# Patient Record
Sex: FEMALE | Race: WHITE | NOT HISPANIC OR LATINO | Employment: OTHER | URBAN - METROPOLITAN AREA
[De-identification: names, ages, dates, MRNs, and addresses within clinical notes are randomized per-mention and may not be internally consistent; named-entity substitution may affect disease eponyms.]

---

## 2021-09-01 ENCOUNTER — TELEPHONE (OUTPATIENT)
Dept: FAMILY MEDICINE CLINIC | Facility: CLINIC | Age: 71
End: 2021-09-01

## 2021-09-24 ENCOUNTER — OFFICE VISIT (OUTPATIENT)
Dept: FAMILY MEDICINE CLINIC | Facility: CLINIC | Age: 71
End: 2021-09-24

## 2021-09-24 VITALS
BODY MASS INDEX: 33.16 KG/M2 | HEART RATE: 76 BPM | SYSTOLIC BLOOD PRESSURE: 120 MMHG | RESPIRATION RATE: 16 BRPM | WEIGHT: 180.2 LBS | OXYGEN SATURATION: 95 % | HEIGHT: 62 IN | TEMPERATURE: 96.2 F | DIASTOLIC BLOOD PRESSURE: 68 MMHG

## 2021-09-24 DIAGNOSIS — E11.43 TYPE 2 DIABETES MELLITUS WITH DIABETIC AUTONOMIC NEUROPATHY, WITHOUT LONG-TERM CURRENT USE OF INSULIN (HCC): Primary | ICD-10-CM

## 2021-09-24 DIAGNOSIS — I10 ESSENTIAL HYPERTENSION: ICD-10-CM

## 2021-09-24 DIAGNOSIS — E78.5 HYPERLIPIDEMIA, UNSPECIFIED HYPERLIPIDEMIA TYPE: ICD-10-CM

## 2021-09-24 DIAGNOSIS — G47.33 OBSTRUCTIVE SLEEP APNEA: ICD-10-CM

## 2021-09-24 DIAGNOSIS — K21.9 GASTROESOPHAGEAL REFLUX DISEASE WITHOUT ESOPHAGITIS: ICD-10-CM

## 2021-09-24 PROCEDURE — 99204 OFFICE O/P NEW MOD 45 MIN: CPT | Performed by: INTERNAL MEDICINE

## 2021-09-24 RX ORDER — AMLODIPINE BESYLATE 5 MG/1
5 TABLET ORAL DAILY
COMMUNITY
End: 2022-03-21 | Stop reason: SDUPTHER

## 2021-09-24 RX ORDER — ESCITALOPRAM OXALATE 10 MG/1
10 TABLET ORAL DAILY
COMMUNITY
End: 2021-10-18 | Stop reason: SDUPTHER

## 2021-09-24 RX ORDER — DIPHENOXYLATE HYDROCHLORIDE AND ATROPINE SULFATE 2.5; .025 MG/1; MG/1
1 TABLET ORAL 4 TIMES DAILY PRN
COMMUNITY
End: 2022-03-09 | Stop reason: SDUPTHER

## 2021-09-24 RX ORDER — ONDANSETRON 4 MG/1
4 TABLET, FILM COATED ORAL EVERY 8 HOURS PRN
COMMUNITY
End: 2021-12-03

## 2021-09-24 RX ORDER — DIPHENOXYLATE HYDROCHLORIDE AND ATROPINE SULFATE 2.5; .025 MG/1; MG/1
TABLET ORAL DAILY
COMMUNITY
End: 2022-11-03

## 2021-09-24 RX ORDER — MELOXICAM 15 MG/1
15 TABLET ORAL DAILY
COMMUNITY
End: 2021-12-03

## 2021-09-24 RX ORDER — NYSTATIN 100000 U/G
1 OINTMENT TOPICAL 2 TIMES DAILY
COMMUNITY
End: 2022-06-28 | Stop reason: SDUPTHER

## 2021-09-24 RX ORDER — ATORVASTATIN CALCIUM 20 MG/1
20 TABLET, FILM COATED ORAL DAILY
COMMUNITY
End: 2021-12-14 | Stop reason: SDUPTHER

## 2021-09-24 RX ORDER — ESOMEPRAZOLE MAGNESIUM 40 MG/1
40 CAPSULE, DELAYED RELEASE ORAL
COMMUNITY
End: 2022-07-21 | Stop reason: SDUPTHER

## 2021-09-24 RX ORDER — METFORMIN HYDROCHLORIDE 500 MG/1
500 TABLET, EXTENDED RELEASE ORAL
COMMUNITY
End: 2021-10-18 | Stop reason: SDUPTHER

## 2021-09-24 RX ORDER — GABAPENTIN 300 MG/1
300 CAPSULE ORAL 3 TIMES DAILY
COMMUNITY
End: 2021-11-18 | Stop reason: SDUPTHER

## 2021-09-24 NOTE — PROGRESS NOTES
"Chief Complaint  Establish Care    Subjective          Pearl Christiansen presents to [facility]  History of Present Illness  Here as a new patient and recently moved from [place] to be near her son, Diego Christiansen and his family.  Has recently had what sounds like lichen sclerosis and a gyn in [place] biopsied it and eventually an area   She has NIDDM on metformin 500 mg bid and has neuropathy on gabapentin 300 mg tid and mobic 15 mg qd.  FBS test bid and usually run 126-130.  a1c around 7.  Not exercising.  Denies kidney issues.  Has seen an eye doc within the year.  Has ALEXANDER and uses CPAP nightly.      Objective   Vital Signs:   /68   Pulse 76   Temp 96.2 °F (35.7 °C) (Temporal)   Resp 16   Ht 157.5 cm (62\")   Wt 81.7 kg (180 lb 3.2 oz)   SpO2 95%   BMI 32.96 kg/m²     Physical Exam  Vitals and nursing note reviewed.   Constitutional:       Appearance: Normal appearance. She is well-developed.   HENT:      Head: Normocephalic and atraumatic.      Right Ear: External ear normal.      Left Ear: External ear normal.   Eyes:      Extraocular Movements: Extraocular movements intact.      Conjunctiva/sclera: Conjunctivae normal.   Neck:      Vascular: No carotid bruit.   Cardiovascular:      Rate and Rhythm: Normal rate and regular rhythm.      Heart sounds: Normal heart sounds.      Comments: No bruits  Pulmonary:      Effort: Pulmonary effort is normal. No respiratory distress.      Breath sounds: Normal breath sounds. No wheezing or rales.   Abdominal:      General: Bowel sounds are normal. There is no distension.      Palpations: Abdomen is soft. There is no mass.      Tenderness: There is no abdominal tenderness.   Musculoskeletal:      Cervical back: Neck supple.   Lymphadenopathy:      Cervical: No cervical adenopathy.   Skin:     General: Skin is warm.   Neurological:      General: No focal deficit present.      Mental Status: She is alert and oriented to person, place, and " time.   Psychiatric:         Mood and Affect: Mood normal.         Behavior: Behavior normal.         Thought Content: Thought content normal.         Judgment: Judgment normal.        Result Review :                 Assessment and Plan    Diagnoses and all orders for this visit:    1. Type 2 diabetes mellitus with diabetic autonomic neuropathy, without long-term current use of insulin (CMS/Formerly [facility]) (Primary)  -     Ambulatory Referral to Ophthalmology  -     Comprehensive Metabolic Panel; Future  -     Hemoglobin A1c; Future  -     Lipid Panel With LDL / HDL Ratio; Future  -     TSH; Future  -     T4, Free; Future    2. Essential hypertension  -     Comprehensive Metabolic Panel; Future  -     Hemoglobin A1c; Future  -     Lipid Panel With LDL / HDL Ratio; Future  -     TSH; Future  -     T4, Free; Future    3. Hyperlipidemia, unspecified hyperlipidemia type  -     Comprehensive Metabolic Panel; Future  -     Hemoglobin A1c; Future  -     Lipid Panel With LDL / HDL Ratio; Future  -     TSH; Future  -     T4, Free; Future    4. Gastroesophageal reflux disease without esophagitis  -     Comprehensive Metabolic Panel; Future  -     Hemoglobin A1c; Future  -     Lipid Panel With LDL / HDL Ratio; Future  -     TSH; Future  -     T4, Free; Future        Follow Up   No follow-ups on file.  Patient was given instructions and counseling regarding her condition or for health maintenance advice. Please see specific information pulled into the AVS if appropriate.     Total time spent coordinating care for this patient 45 minutes.  Reviewing old information from previous healthcare provider in [place].  Reviewing care everywhere.  Updating vaccinations.  Formulating plan of care.  Reviewing lab data.  Reviewing medications.  Past medical history reviewed.  We have discussed that she will need high-dose flu shot and then if she wants the third booster of Covid she will be due for that in December.  Next year she can consider  shingles vaccination.  I have ordered fasting labs for her.  We discussed preventative counseling such as exercise importance and dietary restrictions.  She states that she has gained weight since she is moved to [place] due to lack of exercise and focus on diet.  She uses a CPAP machine I have referred her to sleep medicine.  I have referred her to an eye doctor in [place] because she did have a bleed earlier this year.  Medications are not due for refill at this time.  I have encouraged her to reach out to her gynecologist in [place] about the use of a steroid ointment indefinitely.  I advised her that oftentimes that can thin the skin.  I suggested that she reach out to them about a maintenance cream.

## 2021-09-27 DIAGNOSIS — I10 ESSENTIAL HYPERTENSION: ICD-10-CM

## 2021-09-27 DIAGNOSIS — K21.9 GASTROESOPHAGEAL REFLUX DISEASE WITHOUT ESOPHAGITIS: ICD-10-CM

## 2021-09-27 DIAGNOSIS — E11.43 TYPE 2 DIABETES MELLITUS WITH DIABETIC AUTONOMIC NEUROPATHY, WITHOUT LONG-TERM CURRENT USE OF INSULIN (HCC): ICD-10-CM

## 2021-09-27 DIAGNOSIS — E78.5 HYPERLIPIDEMIA, UNSPECIFIED HYPERLIPIDEMIA TYPE: ICD-10-CM

## 2021-09-29 LAB
ALBUMIN SERPL-MCNC: 4.6 G/DL (ref 3.5–5.2)
ALBUMIN/GLOB SERPL: 1.9 G/DL
ALP SERPL-CCNC: 66 U/L (ref 39–117)
ALT SERPL-CCNC: 13 U/L (ref 1–33)
AST SERPL-CCNC: 18 U/L (ref 1–32)
BILIRUB SERPL-MCNC: 0.3 MG/DL (ref 0–1.2)
BUN SERPL-MCNC: 20 MG/DL (ref 8–23)
BUN/CREAT SERPL: 22.5 (ref 7–25)
CALCIUM SERPL-MCNC: 9.4 MG/DL (ref 8.6–10.5)
CHLORIDE SERPL-SCNC: 101 MMOL/L (ref 98–107)
CHOLEST SERPL-MCNC: 162 MG/DL (ref 0–200)
CO2 SERPL-SCNC: 28.9 MMOL/L (ref 22–29)
CREAT SERPL-MCNC: 0.89 MG/DL (ref 0.57–1)
GLOBULIN SER CALC-MCNC: 2.4 GM/DL
GLUCOSE SERPL-MCNC: 122 MG/DL (ref 65–99)
HBA1C MFR BLD: 7.1 % (ref 4.8–5.6)
HDLC SERPL-MCNC: 42 MG/DL (ref 40–60)
LDLC SERPL CALC-MCNC: 78 MG/DL (ref 0–100)
LDLC/HDLC SERPL: 1.65 {RATIO}
POTASSIUM SERPL-SCNC: 4.4 MMOL/L (ref 3.5–5.2)
PROT SERPL-MCNC: 7 G/DL (ref 6–8.5)
SODIUM SERPL-SCNC: 138 MMOL/L (ref 136–145)
T4 FREE SERPL-MCNC: 0.75 NG/DL (ref 0.93–1.7)
TRIGL SERPL-MCNC: 253 MG/DL (ref 0–150)
TSH SERPL DL<=0.005 MIU/L-ACNC: 3.07 UIU/ML (ref 0.27–4.2)
VLDLC SERPL CALC-MCNC: 42 MG/DL (ref 5–40)

## 2021-09-30 DIAGNOSIS — R94.6 ABNORMAL RESULTS OF THYROID FUNCTION STUDIES: Primary | ICD-10-CM

## 2021-10-01 LAB
Lab: NORMAL
THYROGLOB AB SERPL-ACNC: <1 IU/ML (ref 0–0.9)
THYROPEROXIDASE AB SERPL-ACNC: 11 IU/ML (ref 0–34)
WRITTEN AUTHORIZATION: NORMAL

## 2021-10-04 ENCOUNTER — TELEPHONE (OUTPATIENT)
Dept: FAMILY MEDICINE CLINIC | Facility: CLINIC | Age: 71
End: 2021-10-04

## 2021-10-04 NOTE — TELEPHONE ENCOUNTER
"  Caller: ChristiansenPearl    Relationship: Self    Best call back number: [phone]    Caller requesting test results: SELF    What test was performed: LABS    When was the test performed: 09/28/2021 - THE PATIENT SHOWED UP TO THE APPOINTMENT THAT IS LABELED AS A \"NO SHOW\" ON HER PROFILE.     Where was the test performed: IN-OFFICE    Additional notes: THE PATIENT IS CONCERNED ABOUT HER THYROID LEVELS. SHE STATES THAT SHE DOES NOT FEEL LIKE HERSELF LATELY. SHE DOES NOT REMEMBER HER LAST PCP EVER CHECKING HER THYROID LEVEL.      "

## 2021-10-08 RX ORDER — LEVOTHYROXINE SODIUM 0.03 MG/1
25 TABLET ORAL DAILY
Qty: 90 TABLET | Refills: 0 | Status: SHIPPED | OUTPATIENT
Start: 2021-10-08 | End: 2022-01-05 | Stop reason: SDUPTHER

## 2021-10-13 ENCOUNTER — OFFICE VISIT (OUTPATIENT)
Dept: FAMILY MEDICINE CLINIC | Facility: CLINIC | Age: 71
End: 2021-10-13

## 2021-10-13 VITALS
WEIGHT: 180.2 LBS | BODY MASS INDEX: 33.16 KG/M2 | OXYGEN SATURATION: 95 % | DIASTOLIC BLOOD PRESSURE: 77 MMHG | SYSTOLIC BLOOD PRESSURE: 126 MMHG | HEART RATE: 96 BPM | HEIGHT: 62 IN | TEMPERATURE: 96.6 F

## 2021-10-13 DIAGNOSIS — K21.9 GASTROESOPHAGEAL REFLUX DISEASE, UNSPECIFIED WHETHER ESOPHAGITIS PRESENT: ICD-10-CM

## 2021-10-13 DIAGNOSIS — R00.2 PALPITATIONS: ICD-10-CM

## 2021-10-13 DIAGNOSIS — Z09 HOSPITAL DISCHARGE FOLLOW-UP: Primary | ICD-10-CM

## 2021-10-13 DIAGNOSIS — D64.9 ANEMIA, UNSPECIFIED TYPE: ICD-10-CM

## 2021-10-13 PROCEDURE — 99214 OFFICE O/P EST MOD 30 MIN: CPT | Performed by: NURSE PRACTITIONER

## 2021-10-13 NOTE — PROGRESS NOTES
"Chief Complaint  Hospital Follow Up Visit (hosp f/u for throat tightness and palpatations)    Luis Enrique Christiansen presents to [facility]  History of Present Illness New pt to me.  Here for ER f/u. Has frequent episodes of fluttering in chest that last few seconds and have been ongoing. Feels like a butterfly in chest and is brief.      On 10/5/21 she was sitting on couch and felt same fluttering but also had a tightening in her throat that was not usual. She also felt very fatigued afterward. Went to ER and had negative CTA and stress test. Labs were grossly normal.      Was previously under care of cardiology in [facility] and told she has a small blockage but does not need intervention.      Has recently moved from [place].  Has T2DM controlled with current meds.  Found to have a slightly low T4, normal TSH and started on low dose levothyroxine by PCP-did not start that prior to Er visit.      She does have quite a bit of reflux despite nexium 80 mg daily.  Was followed by GI.  She admits feeling like she has a pit in her stomach that is improved with food for short time.  No blood in stool.  Takes mobic at  for arthritis.     Denies current chest pain.  Intermittent flutters in chest last few seconds.  No n/v/melena.  Has IBS type sx.  Has neuropathy.  Has fatigue-worse in last couple months. No dizziness, HA.  Does not monitor HR or BP at home.     Objective   Vital Signs:   /77   Pulse 96   Temp 96.6 °F (35.9 °C)   Ht 157.5 cm (62\")   Wt 81.7 kg (180 lb 3.2 oz)   SpO2 95%   BMI 32.96 kg/m²     Physical Exam  Vitals and nursing note reviewed.   Constitutional:       General: She is not in acute distress.     Appearance: She is well-developed. She is obese. She is not ill-appearing or diaphoretic.   HENT:      Head: Normocephalic and atraumatic.      Right Ear: Ear canal and external ear normal.      Left Ear: Ear canal and external ear normal.      Ears: "      Comments: B/l TMs dull-no erythema     Mouth/Throat:      Mouth: Mucous membranes are moist.      Pharynx: No posterior oropharyngeal erythema.   Eyes:      General:         Right eye: No discharge.         Left eye: No discharge.      Conjunctiva/sclera: Conjunctivae normal.   Cardiovascular:      Rate and Rhythm: Normal rate and regular rhythm.      Heart sounds: Normal heart sounds.   Pulmonary:      Effort: Pulmonary effort is normal.      Breath sounds: Normal breath sounds.   Abdominal:      General: Bowel sounds are normal.      Palpations: Abdomen is soft.      Tenderness: There is no abdominal tenderness.   Musculoskeletal:         General: No deformity.      Comments: Gait smooth and steady   Skin:     General: Skin is warm and dry.   Neurological:      Mental Status: She is alert and oriented to person, place, and time.   Psychiatric:         Mood and Affect: Mood normal.         Behavior: Behavior normal.        Result Review :                 Assessment and Plan    Diagnoses and all orders for this visit:    1. Hospital discharge follow-up (Primary)    2. Palpitations  -     Ambulatory Referral to Cardiology    3. Gastroesophageal reflux disease, unspecified whether esophagitis present      Reviewed ER records-negative CTA, normal stress test. Labs grossly normal, A1C good.  TSH normal.  EKG was SR.      Given her ongoing sx I think she will benefit from event monitor possibly but will refer to cardiology for eval since she needs to be est anyway.  If any worsening sx go to ER.      She has quite a bit of reflux that could cause sx despite 80mg Nexium per day.  No GI eval in chart.  She is also mildly anemic in most recent labs and sx suggestive of PUD-I think she needs GI consult.  Take mobic only with food.  Stools for occult blood.  Will refer to GI. I think she needs an EGD if not had one. If stools negative will need w/u for anemia. Reflux may have been cause of her sx.      Flonase for  allergy sx.     All discussed and reviewed with pt and is agreeable with plan.  We discussed s/s requiring emergent tx.             Follow Up   Return if symptoms worsen or fail to improve.  Patient was given instructions and counseling regarding her condition or for health maintenance advice. Please see specific information pulled into the AVS if appropriate.

## 2021-10-18 DIAGNOSIS — R19.7 DIARRHEA, UNSPECIFIED TYPE: Primary | ICD-10-CM

## 2021-10-18 NOTE — TELEPHONE ENCOUNTER
Rx Refill Note  Requested Prescriptions     Pending Prescriptions Disp Refills   • metFORMIN ER (GLUCOPHAGE-XR) 500 MG 24 hr tablet 60 tablet 0     Sig: Take 1 tablet by mouth 2 (Two) Times a Day Before Meals.   • SITagliptin (JANUVIA) 50 MG tablet 30 tablet 0     Sig: Take 1 tablet by mouth Daily.   • diphenoxylate-atropine (LOMOTIL) 2.5-0.025 MG per tablet 120 tablet 0     Sig: Take 1 tablet by mouth 4 (Four) Times a Day As Needed for Diarrhea.   • escitalopram (LEXAPRO) 10 MG tablet 30 tablet 0     Sig: Take 1 tablet by mouth Daily.      Last office visit with prescribing clinician: 9/24/2021      Next office visit with prescribing clinician: 3/30/2022            Diego Leon MA  10/18/21, 11:26 EDT

## 2021-10-19 ENCOUNTER — CLINICAL SUPPORT (OUTPATIENT)
Dept: FAMILY MEDICINE CLINIC | Facility: CLINIC | Age: 71
End: 2021-10-19

## 2021-10-19 DIAGNOSIS — R19.7 DIARRHEA, UNSPECIFIED TYPE: Primary | ICD-10-CM

## 2021-10-19 PROCEDURE — 82274 ASSAY TEST FOR BLOOD FECAL: CPT | Performed by: INTERNAL MEDICINE

## 2021-10-19 PROCEDURE — 82274 ASSAY TEST FOR BLOOD FECAL: CPT | Performed by: NURSE PRACTITIONER

## 2021-10-19 RX ORDER — DIPHENOXYLATE HYDROCHLORIDE AND ATROPINE SULFATE 2.5; .025 MG/1; MG/1
1 TABLET ORAL 4 TIMES DAILY PRN
Qty: 120 TABLET | Refills: 0 | OUTPATIENT
Start: 2021-10-19

## 2021-10-19 RX ORDER — ESCITALOPRAM OXALATE 10 MG/1
10 TABLET ORAL DAILY
Qty: 90 TABLET | Refills: 1 | Status: SHIPPED | OUTPATIENT
Start: 2021-10-19 | End: 2022-04-25

## 2021-10-19 RX ORDER — METFORMIN HYDROCHLORIDE 500 MG/1
500 TABLET, EXTENDED RELEASE ORAL
Qty: 180 TABLET | Refills: 1 | Status: SHIPPED | OUTPATIENT
Start: 2021-10-19 | End: 2022-04-11

## 2021-10-20 LAB
EXPIRATION DATE 2: ABNORMAL
EXPIRATION DATE 3: ABNORMAL
EXPIRATION DATE: ABNORMAL
GASTROCULT GAST QL: NEGATIVE
HEMOCCULT SP2 STL QL: POSITIVE
HEMOCCULT SP3 STL QL: POSITIVE
Lab: ABNORMAL

## 2021-10-23 DIAGNOSIS — R19.5 HEMATEST POSITIVE STOOLS: Primary | ICD-10-CM

## 2021-10-28 ENCOUNTER — APPOINTMENT (OUTPATIENT)
Dept: SLEEP MEDICINE | Facility: HOSPITAL | Age: 71
End: 2021-10-28

## 2021-11-01 ENCOUNTER — PREP FOR SURGERY (OUTPATIENT)
Dept: SURGERY | Facility: SURGERY CENTER | Age: 71
End: 2021-11-01

## 2021-11-01 DIAGNOSIS — Z12.11 ENCOUNTER FOR SCREENING FOR MALIGNANT NEOPLASM OF COLON: ICD-10-CM

## 2021-11-01 DIAGNOSIS — D64.9 ANEMIA, UNSPECIFIED TYPE: Primary | ICD-10-CM

## 2021-11-01 RX ORDER — SODIUM CHLORIDE 0.9 % (FLUSH) 0.9 %
3 SYRINGE (ML) INJECTION EVERY 12 HOURS SCHEDULED
Status: CANCELLED | OUTPATIENT
Start: 2021-11-01

## 2021-11-01 RX ORDER — SODIUM CHLORIDE, SODIUM LACTATE, POTASSIUM CHLORIDE, CALCIUM CHLORIDE 600; 310; 30; 20 MG/100ML; MG/100ML; MG/100ML; MG/100ML
30 INJECTION, SOLUTION INTRAVENOUS CONTINUOUS PRN
Status: CANCELLED | OUTPATIENT
Start: 2021-11-01

## 2021-11-01 RX ORDER — SODIUM CHLORIDE 0.9 % (FLUSH) 0.9 %
10 SYRINGE (ML) INJECTION AS NEEDED
Status: CANCELLED | OUTPATIENT
Start: 2021-11-01

## 2021-11-02 ENCOUNTER — LAB REQUISITION (OUTPATIENT)
Dept: LAB | Facility: HOSPITAL | Age: 71
End: 2021-11-02

## 2021-11-02 ENCOUNTER — TELEPHONE (OUTPATIENT)
Dept: GASTROENTEROLOGY | Facility: CLINIC | Age: 71
End: 2021-11-02

## 2021-11-02 DIAGNOSIS — Z00.00 ENCOUNTER FOR GENERAL ADULT MEDICAL EXAMINATION WITHOUT ABNORMAL FINDINGS: ICD-10-CM

## 2021-11-02 LAB — SARS-COV-2 ORF1AB RESP QL NAA+PROBE: NOT DETECTED

## 2021-11-02 PROCEDURE — U0004 COV-19 TEST NON-CDC HGH THRU: HCPCS | Performed by: INTERNAL MEDICINE

## 2021-11-05 ENCOUNTER — OUTSIDE FACILITY SERVICE (OUTPATIENT)
Dept: GASTROENTEROLOGY | Facility: CLINIC | Age: 71
End: 2021-11-05

## 2021-11-05 PROCEDURE — 45380 COLONOSCOPY AND BIOPSY: CPT | Performed by: INTERNAL MEDICINE

## 2021-11-05 PROCEDURE — 43239 EGD BIOPSY SINGLE/MULTIPLE: CPT | Performed by: INTERNAL MEDICINE

## 2021-11-11 ENCOUNTER — OFFICE VISIT (OUTPATIENT)
Dept: CARDIOLOGY | Facility: CLINIC | Age: 71
End: 2021-11-11

## 2021-11-11 VITALS
DIASTOLIC BLOOD PRESSURE: 72 MMHG | HEART RATE: 74 BPM | SYSTOLIC BLOOD PRESSURE: 128 MMHG | WEIGHT: 180.6 LBS | HEIGHT: 62 IN | OXYGEN SATURATION: 99 % | BODY MASS INDEX: 33.23 KG/M2

## 2021-11-11 DIAGNOSIS — I25.10 CORONARY ARTERY DISEASE INVOLVING NATIVE CORONARY ARTERY OF NATIVE HEART WITHOUT ANGINA PECTORIS: ICD-10-CM

## 2021-11-11 DIAGNOSIS — R00.2 PALPITATIONS: Primary | ICD-10-CM

## 2021-11-11 DIAGNOSIS — I10 PRIMARY HYPERTENSION: ICD-10-CM

## 2021-11-11 PROCEDURE — 99204 OFFICE O/P NEW MOD 45 MIN: CPT | Performed by: INTERNAL MEDICINE

## 2021-11-12 DIAGNOSIS — R19.8 ABNORMAL FINDINGS ON ESOPHAGOGASTRODUODENOSCOPY (EGD): Primary | ICD-10-CM

## 2021-11-12 NOTE — PROGRESS NOTES
"Date of Office Visit: 2021  Encounter Provider: Ady Hutson MD  Place of Service: [facility] CARDI[facility]  Patient Name: Pearl Christiansen  :1950    Chief complaint: Palpitations, coronary artery disease, hypertension.    History of Present Illness:    I had the pleasure of seeing the patient in cardiology office on 2021.  She is a very pleasant 71 year-old female with a history of diabetes, hypertension, coronary artery disease, and treated obstructive sleep apnea who presents to establish care and also for evaluation.  The patient recently moved to [place] in 2021 from [place], [place].    The patient does have a fairly longstanding history of diabetes.  She also did have a left heart catheterization on 2017 in [place].  At that time, she had 50% disease in her mid LAD, and only luminal irregularities in the circumflex and RCA.  She was treated medically at that time.  She also has a history of short atrial runs been noted on a Zio patch in .    The patient was admitted to [facility] on 10/5/2021 with chest discomfort.  She was seated on her couch when she had an episode of a fast and long run of palpitations.  She states that her pulse felt rapid, and this was intense.  She has had palpitations for quite some time where her heart simply \"flutters\" quickly and then abates.  However, this was a much more prolonged episode which was associated with throat pain and tightness.  She was not found to have any arrhythmias in the hospital.  She did have a CT angiogram of the chest on 10/5/2021 which did not show any evidence of pulmonary embolism or aortic pathology.  She also had a Lexiscan nuclear stress test on 10/6/2021 which showed an apical artifact, but no ischemia.    The patient is here to establish care with me, but also to further investigate her palpitations as noted above.  She has not had any chest discomfort, and " she has not had any recurrent throat tightening or discomfort.  She still has the short fluttering palpitations, although she has not had any recurrence of the prolonged rapid palpitations.    Past Medical History:   Diagnosis Date   • Anxiety    • Chest pain     stated in 10-6-2021 [place] cardiology consult note   • Depression    • Diabetes mellitus (HCC)    • GERD (gastroesophageal reflux disease)    • Hyperlipidemia    • Hypertension     stated in 10-6-2021 [place] cardiology consult note   • ALEXANDER (obstructive sleep apnea)     stated in 10-6-2021 [place] cardiology consult note       Past Surgical History:   Procedure Laterality Date   • CARDIAC CATHETERIZATION Left 08/14/2017    [facility]   • GALLBLADDER SURGERY     • TRIGGER FINGER RELEASE         Current Outpatient Medications on File Prior to Visit   Medication Sig Dispense Refill   • acetaminophen (Tylenol) 325 MG tablet Take 650 mg by mouth Every 6 (Six) Hours As Needed for Mild Pain .     • amLODIPine (NORVASC) 5 MG tablet Take 5 mg by mouth Daily.     • atorvastatin (LIPITOR) 20 MG tablet Take 20 mg by mouth Daily.     • B Complex-C (SUPER B COMPLEX PO) Take  by mouth.     • diphenoxylate-atropine (LOMOTIL) 2.5-0.025 MG per tablet Take 1 tablet by mouth 4 (Four) Times a Day As Needed for Diarrhea.     • escitalopram (LEXAPRO) 10 MG tablet Take 1 tablet by mouth Daily. 90 tablet 1   • esomeprazole (nexIUM) 40 MG capsule Take 40 mg by mouth 2 (two) times a day.     • gabapentin (NEURONTIN) 300 MG capsule Take 300 mg by mouth 3 (Three) Times a Day.     • levothyroxine (SYNTHROID, LEVOTHROID) 25 MCG tablet Take 1 tablet by mouth Daily. 90 tablet 0   • metFORMIN ER (GLUCOPHAGE-XR) 500 MG 24 hr tablet Take 1 tablet by mouth 2 (Two) Times a Day Before Meals. 180 tablet 1   • multivitamin (MULTIVITAMIN PO) Take  by mouth Daily.     • nystatin (MYCOSTATIN) 237495 UNIT/GM ointment Apply 1 application topically to the appropriate area as directed 2 (Two) Times a Day.   "   • ondansetron (ZOFRAN) 4 MG tablet Take 4 mg by mouth Every 8 (Eight) Hours As Needed for Nausea or Vomiting.     • SITagliptin (JANUVIA) 50 MG tablet Take 1 tablet by mouth Daily. 90 tablet 1   • triamcinolone (KENALOG) 0.1 % ointment Apply 1 application topically to the appropriate area as directed 2 (Two) Times a Day.     • VITAMIN E PO Take  by mouth.     • meloxicam (MOBIC) 15 MG tablet Take 15 mg by mouth Daily.     • VITAMIN E PO Take 1 tablet by mouth Daily.       No current facility-administered medications on file prior to visit.     Allergies as of 11/11/2021 - Reviewed 11/11/2021   Allergen Reaction Noted   • Keflex [cephalexin] Rash 09/24/2021   • Penicillins Rash 09/24/2021     Social History     Socioeconomic History   • Marital status:    Tobacco Use   • Smoking status: Former Smoker   • Smokeless tobacco: Former User   • Tobacco comment: quit in her 40's   Substance and Sexual Activity   • Alcohol use: Yes     Comment: ocassionally    • Drug use: Never     Family History   Problem Relation Age of Onset   • Hypertension Mother    • COPD Mother        Review of Systems   Cardiovascular: Positive for palpitations.   All other systems reviewed and are negative.     Objective:     Vitals:    11/11/21 1033   BP: 128/72   Pulse: 74   SpO2: 99%   Weight: 81.9 kg (180 lb 9.6 oz)   Height: 157.5 cm (62.01\")     Body mass index is 33.02 kg/m².    Constitutional:       Appearance: Healthy appearance. Well-developed.   Eyes:      Conjunctiva/sclera: Conjunctivae normal.   HENT:      Head: Normocephalic and atraumatic.   Pulmonary:      Effort: Pulmonary effort is normal.      Breath sounds: Normal breath sounds.   Cardiovascular:      Normal rate. Regular rhythm.      Murmurs: There is no murmur.      No gallop.   Edema:     Peripheral edema absent.   Abdominal:      Palpations: Abdomen is soft.      Tenderness: There is no abdominal tenderness.   Musculoskeletal:      Cervical back: Neck supple. " Skin:     General: Skin is warm.   Neurological:      Mental Status: Alert and oriented to person, place, and time.   Psychiatric:         Behavior: Behavior normal.       Lab Review:   Procedures    Lipid Panel    Lipid Panel 9/28/21   Total Cholesterol 162   Triglycerides 253 (A)   HDL Cholesterol 42   VLDL Cholesterol 42 (A)   LDL Cholesterol  78   LDL/HDL Ratio 1.65   (A) Abnormal value       Comments are available for some flowsheets but are not being displayed.              Cardiac Procedures:  1.  Left heart catheterization in [place] on 8/14/2017: The left main was normal.  The LAD had 50% mid disease.  The left circumflex had luminal irregularities.  The RCA was dominant with luminal irregularities.  2.  CT angiogram of the chest at [facility] on 10/5/2021: No pulmonary embolism or aortic pathology noted.  3.  Lexiscan Myoview stress test on 10/6/2021: There was a small apical defect consistent with artifact.  There was no ischemia.      Assessment:       Diagnosis Plan   1. Palpitations  Adult Transthoracic Echo Complete w/ Color, Spectral and Contrast if Necessary Per Protocol    Holter Monitor - 24 Hour   2. Coronary artery disease involving native coronary artery of native heart without angina pectoris     3. Primary hypertension       Plan:       Again, the patient recently had an episode of prolonged palpitations which felt rapid.  She developed throat discomfort and tightening when this occurred.  It lasted much longer than her normal palpitations, which are usually brief fluttering sensations.  I suspect that the brief palpitations are from PACs or PVCs.  However, the more prolonged episode does need further investigation.  She did have short atrial runs on a Zio patch in 2017, and this certainly could be responsible.  However, more importantly, I want to make sure that she is not having paroxysmal atrial fibrillation.  She did have a CT scan of her chest and a nuclear stress test  at [facility] in early October 2021.  Both of these were fairly normal.    I am going to start with an echocardiogram and a 24-hour Holter monitor.  If the Holter monitor is unrevealing, I will likely progress to a Zio patch.  She does have nonobstructive coronary artery disease of up to 50% in her mid LAD on heart catheterization in 2017.  She also has diabetes.  She will continue on Lipitor 20 mg/day for now.  Her triglycerides were 253 on 9/28/2021.  Her LDL was 78, and her HDL was 42.  If her triglycerides do not improve, she may need specific triglyceride lowering therapy.  Her blood pressure is fairly well controlled on the amlodipine at 5 mg/day.  I will need to see if she is taking aspirin.  I will address this when I call her with her results from the upcoming test.

## 2021-11-18 NOTE — TELEPHONE ENCOUNTER
Caller: Pearl Christiansen    Relationship: Self    Best call back number: [phone]    Requested Prescriptions:   Requested Prescriptions     Pending Prescriptions Disp Refills   • gabapentin (NEURONTIN) 300 MG capsule       Sig: Take 1 capsule by mouth 3 (Three) Times a Day.        Pharmacy where request should be sent: [facility] [address] [place] [address] [address] [facility] [phone] [facility] [phone]

## 2021-11-19 RX ORDER — GABAPENTIN 300 MG/1
300 CAPSULE ORAL 3 TIMES DAILY
Qty: 270 CAPSULE | Refills: 0 | Status: SHIPPED | OUTPATIENT
Start: 2021-11-19 | End: 2022-04-11

## 2021-11-24 ENCOUNTER — HOSPITAL ENCOUNTER (OUTPATIENT)
Dept: CARDIOLOGY | Facility: HOSPITAL | Age: 71
Discharge: HOME | End: 2021-11-24
Admitting: INTERNAL MEDICINE

## 2021-11-24 VITALS
SYSTOLIC BLOOD PRESSURE: 130 MMHG | OXYGEN SATURATION: 96 % | DIASTOLIC BLOOD PRESSURE: 70 MMHG | HEIGHT: 62 IN | WEIGHT: 180 LBS | BODY MASS INDEX: 33.13 KG/M2 | HEART RATE: 96 BPM

## 2021-11-24 DIAGNOSIS — R00.2 PALPITATIONS: ICD-10-CM

## 2021-11-24 PROCEDURE — 93306 TTE W/DOPPLER COMPLETE: CPT | Performed by: INTERNAL MEDICINE

## 2021-11-24 PROCEDURE — 93306 TTE W/DOPPLER COMPLETE: CPT

## 2021-11-29 LAB
ASCENDING AORTA: 3.2 CM
BH CV ECHO MEAS - ACS: 1.8 CM
BH CV ECHO MEAS - AO MAX PG (FULL): 2 MMHG
BH CV ECHO MEAS - AO MAX PG: 5.1 MMHG
BH CV ECHO MEAS - AO MEAN PG (FULL): 1.2 MMHG
BH CV ECHO MEAS - AO MEAN PG: 2.5 MMHG
BH CV ECHO MEAS - AO ROOT AREA (BSA CORRECTED): 1.9
BH CV ECHO MEAS - AO ROOT AREA: 9.5 CM^2
BH CV ECHO MEAS - AO ROOT DIAM: 3.5 CM
BH CV ECHO MEAS - AO V2 MAX: 113.4 CM/SEC
BH CV ECHO MEAS - AO V2 MEAN: 71.8 CM/SEC
BH CV ECHO MEAS - AO V2 VTI: 19.5 CM
BH CV ECHO MEAS - ASC AORTA: 3.2 CM
BH CV ECHO MEAS - AVA(I,A): 3.2 CM^2
BH CV ECHO MEAS - AVA(I,D): 3.2 CM^2
BH CV ECHO MEAS - AVA(V,A): 3.1 CM^2
BH CV ECHO MEAS - AVA(V,D): 3.1 CM^2
BH CV ECHO MEAS - BSA(HAYCOCK): 1.9 M^2
BH CV ECHO MEAS - BSA: 1.8 M^2
BH CV ECHO MEAS - BZI_BMI: 32.9 KILOGRAMS/M^2
BH CV ECHO MEAS - BZI_METRIC_HEIGHT: 157.5 CM
BH CV ECHO MEAS - BZI_METRIC_WEIGHT: 81.6 KG
BH CV ECHO MEAS - EDV(CUBED): 69.5 ML
BH CV ECHO MEAS - EDV(MOD-SP2): 56 ML
BH CV ECHO MEAS - EDV(MOD-SP4): 54 ML
BH CV ECHO MEAS - EDV(TEICH): 74.7 ML
BH CV ECHO MEAS - EF(CUBED): 82.7 %
BH CV ECHO MEAS - EF(MOD-BP): 62 %
BH CV ECHO MEAS - EF(MOD-SP2): 62.5 %
BH CV ECHO MEAS - EF(MOD-SP4): 63 %
BH CV ECHO MEAS - EF(TEICH): 76 %
BH CV ECHO MEAS - ESV(CUBED): 12 ML
BH CV ECHO MEAS - ESV(MOD-SP2): 21 ML
BH CV ECHO MEAS - ESV(MOD-SP4): 20 ML
BH CV ECHO MEAS - ESV(TEICH): 17.9 ML
BH CV ECHO MEAS - FS: 44.3 %
BH CV ECHO MEAS - IVS/LVPW: 0.99
BH CV ECHO MEAS - IVSD: 1.2 CM
BH CV ECHO MEAS - LAT PEAK E' VEL: 6.5 CM/SEC
BH CV ECHO MEAS - LV DIASTOLIC VOL/BSA (35-75): 29.5 ML/M^2
BH CV ECHO MEAS - LV MASS(C)D: 181 GRAMS
BH CV ECHO MEAS - LV MASS(C)DI: 99.1 GRAMS/M^2
BH CV ECHO MEAS - LV MAX PG: 3.2 MMHG
BH CV ECHO MEAS - LV MEAN PG: 1.3 MMHG
BH CV ECHO MEAS - LV SYSTOLIC VOL/BSA (12-30): 10.9 ML/M^2
BH CV ECHO MEAS - LV V1 MAX: 88.8 CM/SEC
BH CV ECHO MEAS - LV V1 MEAN: 50.1 CM/SEC
BH CV ECHO MEAS - LV V1 VTI: 16 CM
BH CV ECHO MEAS - LVIDD: 4.1 CM
BH CV ECHO MEAS - LVIDS: 2.3 CM
BH CV ECHO MEAS - LVLD AP2: 7.1 CM
BH CV ECHO MEAS - LVLD AP4: 6.5 CM
BH CV ECHO MEAS - LVLS AP2: 5.7 CM
BH CV ECHO MEAS - LVLS AP4: 5.3 CM
BH CV ECHO MEAS - LVOT AREA (M): 3.8 CM^2
BH CV ECHO MEAS - LVOT AREA: 3.9 CM^2
BH CV ECHO MEAS - LVOT DIAM: 2.2 CM
BH CV ECHO MEAS - LVPWD: 1.2 CM
BH CV ECHO MEAS - MED PEAK E' VEL: 7.1 CM/SEC
BH CV ECHO MEAS - MV A DUR: 0.12 SEC
BH CV ECHO MEAS - MV A MAX VEL: 91.7 CM/SEC
BH CV ECHO MEAS - MV DEC SLOPE: 248.5 CM/SEC^2
BH CV ECHO MEAS - MV DEC TIME: 0.22 SEC
BH CV ECHO MEAS - MV E MAX VEL: 59.7 CM/SEC
BH CV ECHO MEAS - MV E/A: 0.65
BH CV ECHO MEAS - MV MAX PG: 4.8 MMHG
BH CV ECHO MEAS - MV MEAN PG: 1.8 MMHG
BH CV ECHO MEAS - MV P1/2T MAX VEL: 60.1 CM/SEC
BH CV ECHO MEAS - MV P1/2T: 70.9 MSEC
BH CV ECHO MEAS - MV V2 MAX: 109.9 CM/SEC
BH CV ECHO MEAS - MV V2 MEAN: 63.2 CM/SEC
BH CV ECHO MEAS - MV V2 VTI: 21.2 CM
BH CV ECHO MEAS - MVA P1/2T LCG: 3.7 CM^2
BH CV ECHO MEAS - MVA(P1/2T): 3.1 CM^2
BH CV ECHO MEAS - MVA(VTI): 3 CM^2
BH CV ECHO MEAS - PA MAX PG (FULL): 0.62 MMHG
BH CV ECHO MEAS - PA MAX PG: 3.4 MMHG
BH CV ECHO MEAS - PA V2 MAX: 92.3 CM/SEC
BH CV ECHO MEAS - PULM A REVS DUR: 0.12 SEC
BH CV ECHO MEAS - PULM A REVS VEL: 24.3 CM/SEC
BH CV ECHO MEAS - PULM DIAS VEL: 35.9 CM/SEC
BH CV ECHO MEAS - PULM S/D: 1.5
BH CV ECHO MEAS - PULM SYS VEL: 52.9 CM/SEC
BH CV ECHO MEAS - PVA(V,A): 3 CM^2
BH CV ECHO MEAS - PVA(V,D): 3 CM^2
BH CV ECHO MEAS - QP/QS: 0.86
BH CV ECHO MEAS - RV MAX PG: 2.8 MMHG
BH CV ECHO MEAS - RV MEAN PG: 1.5 MMHG
BH CV ECHO MEAS - RV V1 MAX: 83.4 CM/SEC
BH CV ECHO MEAS - RV V1 MEAN: 56.9 CM/SEC
BH CV ECHO MEAS - RV V1 VTI: 16.5 CM
BH CV ECHO MEAS - RVOT AREA: 3.3 CM^2
BH CV ECHO MEAS - RVOT DIAM: 2 CM
BH CV ECHO MEAS - SI(AO): 101.2 ML/M^2
BH CV ECHO MEAS - SI(CUBED): 31.5 ML/M^2
BH CV ECHO MEAS - SI(LVOT): 34.2 ML/M^2
BH CV ECHO MEAS - SI(MOD-SP2): 19.1 ML/M^2
BH CV ECHO MEAS - SI(MOD-SP4): 18.6 ML/M^2
BH CV ECHO MEAS - SI(TEICH): 31.1 ML/M^2
BH CV ECHO MEAS - SV(AO): 185.1 ML
BH CV ECHO MEAS - SV(CUBED): 57.5 ML
BH CV ECHO MEAS - SV(LVOT): 62.5 ML
BH CV ECHO MEAS - SV(MOD-SP2): 35 ML
BH CV ECHO MEAS - SV(MOD-SP4): 34 ML
BH CV ECHO MEAS - SV(RVOT): 54 ML
BH CV ECHO MEAS - SV(TEICH): 56.8 ML
BH CV ECHO MEASUREMENTS AVERAGE E/E' RATIO: 8.78
BH CV XLRA - RV BASE: 2.5 CM
BH CV XLRA - RV LENGTH: 6.6 CM
BH CV XLRA - RV MID: 1.9 CM
BH CV XLRA - TDI S': 10 CM/SEC
LEFT ATRIUM VOLUME INDEX: 15 ML/M2
MAXIMAL PREDICTED HEART RATE: 151 BPM
SINUS: 3.2 CM
STJ: 2.5 CM
STRESS TARGET HR: 128 BPM

## 2021-12-01 DIAGNOSIS — R00.2 PALPITATIONS: Primary | ICD-10-CM

## 2021-12-03 ENCOUNTER — OFFICE VISIT (OUTPATIENT)
Dept: GASTROENTEROLOGY | Facility: CLINIC | Age: 71
End: 2021-12-03

## 2021-12-03 VITALS
HEART RATE: 83 BPM | DIASTOLIC BLOOD PRESSURE: 80 MMHG | HEIGHT: 62 IN | RESPIRATION RATE: 18 BRPM | BODY MASS INDEX: 33.42 KG/M2 | OXYGEN SATURATION: 98 % | SYSTOLIC BLOOD PRESSURE: 168 MMHG | WEIGHT: 181.6 LBS | TEMPERATURE: 97.3 F

## 2021-12-03 DIAGNOSIS — R11.0 NAUSEA: ICD-10-CM

## 2021-12-03 DIAGNOSIS — K58.0 IRRITABLE BOWEL SYNDROME WITH DIARRHEA: ICD-10-CM

## 2021-12-03 DIAGNOSIS — R14.0 BLOATING: ICD-10-CM

## 2021-12-03 DIAGNOSIS — R10.11 RIGHT UPPER QUADRANT ABDOMINAL PAIN: Primary | ICD-10-CM

## 2021-12-03 DIAGNOSIS — R19.7 DIARRHEA, UNSPECIFIED TYPE: ICD-10-CM

## 2021-12-03 PROCEDURE — 99214 OFFICE O/P EST MOD 30 MIN: CPT | Performed by: NURSE PRACTITIONER

## 2021-12-03 RX ORDER — DICYCLOMINE HYDROCHLORIDE 10 MG/1
10 CAPSULE ORAL 3 TIMES DAILY PRN
Qty: 90 CAPSULE | Refills: 5 | Status: SHIPPED | OUTPATIENT
Start: 2021-12-03 | End: 2022-09-09 | Stop reason: SDUPTHER

## 2021-12-03 NOTE — PATIENT INSTRUCTIONS
Check CBC today.    Schedule CT abdomen pelvis with contrast for further evaluation of symptoms.    For IBS-D, complete course of Xifaxan as prescribed.    For abdominal cramping/diarrhea, begin trial of dicyclomine as prescribed. Prescription sent to pharmacy.

## 2021-12-04 LAB
BASOPHILS # BLD AUTO: 0.1 X10E3/UL (ref 0–0.2)
BASOPHILS NFR BLD AUTO: 1 %
EOSINOPHIL # BLD AUTO: 0.3 X10E3/UL (ref 0–0.4)
EOSINOPHIL NFR BLD AUTO: 3 %
ERYTHROCYTE [DISTWIDTH] IN BLOOD BY AUTOMATED COUNT: 13.9 % (ref 11.7–15.4)
HCT VFR BLD AUTO: 37.7 % (ref 34–46.6)
HGB BLD-MCNC: 12.3 G/DL (ref 11.1–15.9)
IMM GRANULOCYTES # BLD AUTO: 0.1 X10E3/UL (ref 0–0.1)
IMM GRANULOCYTES NFR BLD AUTO: 1 %
LYMPHOCYTES # BLD AUTO: 2.5 X10E3/UL (ref 0.7–3.1)
LYMPHOCYTES NFR BLD AUTO: 29 %
MCH RBC QN AUTO: 26.9 PG (ref 26.6–33)
MCHC RBC AUTO-ENTMCNC: 32.6 G/DL (ref 31.5–35.7)
MCV RBC AUTO: 83 FL (ref 79–97)
MONOCYTES # BLD AUTO: 0.7 X10E3/UL (ref 0.1–0.9)
MONOCYTES NFR BLD AUTO: 8 %
NEUTROPHILS # BLD AUTO: 5 X10E3/UL (ref 1.4–7)
NEUTROPHILS NFR BLD AUTO: 58 %
PLATELET # BLD AUTO: 293 X10E3/UL (ref 150–450)
RBC # BLD AUTO: 4.57 X10E6/UL (ref 3.77–5.28)
WBC # BLD AUTO: 8.6 X10E3/UL (ref 3.4–10.8)

## 2021-12-10 ENCOUNTER — HOSPITAL ENCOUNTER (OUTPATIENT)
Dept: CT IMAGING | Facility: HOSPITAL | Age: 71
Discharge: HOME | End: 2021-12-10
Admitting: NURSE PRACTITIONER

## 2021-12-10 DIAGNOSIS — K58.0 IRRITABLE BOWEL SYNDROME WITH DIARRHEA: ICD-10-CM

## 2021-12-10 DIAGNOSIS — R11.0 NAUSEA: ICD-10-CM

## 2021-12-10 DIAGNOSIS — R19.7 DIARRHEA, UNSPECIFIED TYPE: ICD-10-CM

## 2021-12-10 DIAGNOSIS — R14.0 BLOATING: ICD-10-CM

## 2021-12-10 DIAGNOSIS — R10.11 RIGHT UPPER QUADRANT ABDOMINAL PAIN: ICD-10-CM

## 2021-12-10 LAB — CREAT BLDA-MCNC: 0.9 MG/DL (ref 0.6–1.3)

## 2021-12-10 PROCEDURE — 0 DIATRIZOATE MEGLUMINE & SODIUM PER 1 ML: Performed by: NURSE PRACTITIONER

## 2021-12-10 PROCEDURE — 25010000002 IOPAMIDOL 61 % SOLUTION: Performed by: NURSE PRACTITIONER

## 2021-12-10 PROCEDURE — 74177 CT ABD & PELVIS W/CONTRAST: CPT

## 2021-12-10 PROCEDURE — 82565 ASSAY OF CREATININE: CPT

## 2021-12-10 RX ADMIN — DIATRIZOATE MEGLUMINE AND DIATRIZOATE SODIUM 30 ML: 660; 100 LIQUID ORAL; RECTAL at 15:00

## 2021-12-10 RX ADMIN — IOPAMIDOL 85 ML: 612 INJECTION, SOLUTION INTRAVENOUS at 16:17

## 2021-12-14 RX ORDER — ATORVASTATIN CALCIUM 20 MG/1
20 TABLET, FILM COATED ORAL DAILY
Qty: 90 TABLET | Refills: 1 | Status: SHIPPED | OUTPATIENT
Start: 2021-12-14 | End: 2022-06-15

## 2021-12-14 NOTE — TELEPHONE ENCOUNTER
Rx Refill Note  Requested Prescriptions     Pending Prescriptions Disp Refills   • atorvastatin (LIPITOR) 20 MG tablet 90 tablet 1     Sig: Take 1 tablet by mouth Daily.      Last office visit with prescribing clinician: 9/24/2021      Next office visit with prescribing clinician: 3/30/2022       {TIP  Please add Last Relevant Lab Date if appropriate: 10/5/21    Beatrice Sampson MA  12/14/21, 16:25 EST

## 2022-01-05 RX ORDER — LEVOTHYROXINE SODIUM 0.03 MG/1
25 TABLET ORAL DAILY
Qty: 90 TABLET | Refills: 1 | Status: SHIPPED | OUTPATIENT
Start: 2022-01-05 | End: 2022-07-14

## 2022-01-05 NOTE — TELEPHONE ENCOUNTER
Rx Refill Note  Requested Prescriptions     Pending Prescriptions Disp Refills   • levothyroxine (SYNTHROID, LEVOTHROID) 25 MCG tablet 90 tablet 0     Sig: Take 1 tablet by mouth Daily.      Last office visit with prescribing clinician: 9/24/2021      Next office visit with prescribing clinician: 3/30/2022            Beatrice Sampson MA  01/05/22, 10:20 EST

## 2022-03-09 NOTE — TELEPHONE ENCOUNTER
Rx Refill Note  Requested Prescriptions     Pending Prescriptions Disp Refills   • diphenoxylate-atropine (LOMOTIL) 2.5-0.025 MG per tablet       Sig: Take 1 tablet by mouth 4 (Four) Times a Day As Needed for Diarrhea.      Last office visit with prescribing clinician: 9/24/2021      Next office visit with prescribing clinician: 3/30/2022            Beatrice Sampson MA  03/09/22, 14:16 EST

## 2022-03-10 RX ORDER — DIPHENOXYLATE HYDROCHLORIDE AND ATROPINE SULFATE 2.5; .025 MG/1; MG/1
1 TABLET ORAL 4 TIMES DAILY PRN
Qty: 25 TABLET | Refills: 0 | Status: SHIPPED | OUTPATIENT
Start: 2022-03-10 | End: 2022-07-11

## 2022-03-21 RX ORDER — DIPHENOXYLATE HYDROCHLORIDE AND ATROPINE SULFATE 2.5; .025 MG/1; MG/1
1 TABLET ORAL 4 TIMES DAILY PRN
Qty: 25 TABLET | Refills: 0 | OUTPATIENT
Start: 2022-03-21

## 2022-03-21 RX ORDER — AMLODIPINE BESYLATE 5 MG/1
5 TABLET ORAL DAILY
Qty: 90 TABLET | Refills: 1 | Status: SHIPPED | OUTPATIENT
Start: 2022-03-21 | End: 2022-07-14

## 2022-03-21 NOTE — TELEPHONE ENCOUNTER
Rx Refill Note  Requested Prescriptions     Pending Prescriptions Disp Refills   • amLODIPine (NORVASC) 5 MG tablet 90 tablet 1     Sig: Take 1 tablet by mouth Daily.   • diphenoxylate-atropine (LOMOTIL) 2.5-0.025 MG per tablet 25 tablet 0     Sig: Take 1 tablet by mouth 4 (Four) Times a Day As Needed for Diarrhea.      Last office visit with prescribing clinician: 9/24/2021      Next office visit with prescribing clinician: 3/30/2022            Beatrice Sampson MA  03/21/22, 10:53 EDT

## 2022-04-11 RX ORDER — GABAPENTIN 300 MG/1
CAPSULE ORAL
Qty: 270 CAPSULE | Refills: 0 | Status: SHIPPED | OUTPATIENT
Start: 2022-04-11 | End: 2022-08-30

## 2022-04-11 RX ORDER — METFORMIN HYDROCHLORIDE 500 MG/1
TABLET, EXTENDED RELEASE ORAL
Qty: 180 TABLET | Refills: 0 | Status: SHIPPED | OUTPATIENT
Start: 2022-04-11 | End: 2022-07-21

## 2022-04-11 NOTE — TELEPHONE ENCOUNTER
Rx Refill Note  Requested Prescriptions     Pending Prescriptions Disp Refills   • metFORMIN ER (GLUCOPHAGE-XR) 500 MG 24 hr tablet [Pharmacy Med Name: metFORMIN HCl  MG Oral Tablet Extended Release 24 Hour] 180 tablet 0     Sig: TAKE 1 TABLET BY MOUTH TWICE DAILY BEFORE MEAL(S)   • gabapentin (NEURONTIN) 300 MG capsule [Pharmacy Med Name: Gabapentin 300 MG Oral Capsule] 270 capsule 0     Sig: TAKE 1 CAPSULE BY MOUTH THREE TIMES DAILY      Last office visit with prescribing clinician: 9/24/2021      Next office visit with prescribing clinician: 6/28/2022       {TIP  Please add Last Relevant Lab Date if appropriate: 9/8/21    Beatrice Sampson MA  04/11/22, 07:58 EDT     Needs contract

## 2022-04-25 RX ORDER — ESCITALOPRAM OXALATE 10 MG/1
TABLET ORAL
Qty: 90 TABLET | Refills: 0 | Status: SHIPPED | OUTPATIENT
Start: 2022-04-25 | End: 2022-07-27

## 2022-04-25 NOTE — TELEPHONE ENCOUNTER
Rx Refill Note  Requested Prescriptions     Pending Prescriptions Disp Refills   • escitalopram (LEXAPRO) 10 MG tablet [Pharmacy Med Name: Escitalopram Oxalate 10 MG Oral Tablet] 90 tablet 0     Sig: Take 1 tablet by mouth once daily      Last office visit with prescribing clinician: 9/24/2021      Next office visit with prescribing clinician: 6/28/2022            Beatrice Sampson MA  04/25/22, 10:44 EDT

## 2022-05-06 RX ORDER — SITAGLIPTIN 50 MG/1
TABLET, FILM COATED ORAL
Qty: 90 TABLET | Refills: 1 | Status: SHIPPED | OUTPATIENT
Start: 2022-05-06 | End: 2022-11-10

## 2022-05-06 NOTE — TELEPHONE ENCOUNTER
Rx Refill Note  Requested Prescriptions     Pending Prescriptions Disp Refills   • Januvia 50 MG tablet [Pharmacy Med Name: Januvia 50 MG Oral Tablet] 90 tablet 0     Sig: Take 1 tablet by mouth once daily      Last office visit with prescribing clinician: 9/24/2021      Next office visit with prescribing clinician: 6/28/2022       {TIP  Please add Last Relevant Lab Date if appropriate: 10/6/21    Beatrice Sampson MA  05/06/22, 13:46 EDT

## 2022-06-14 NOTE — TELEPHONE ENCOUNTER
Rx Refill Note  Requested Prescriptions     Pending Prescriptions Disp Refills   • atorvastatin (LIPITOR) 20 MG tablet [Pharmacy Med Name: Atorvastatin Calcium 20 MG Oral Tablet] 90 tablet 0     Sig: Take 1 tablet by mouth once daily      Last office visit with prescribing clinician: 9/24/2021      Next office visit with prescribing clinician: 6/28/2022            Sierra Gregorio MA  06/14/22, 08:56 EDT

## 2022-06-15 RX ORDER — ATORVASTATIN CALCIUM 20 MG/1
TABLET, FILM COATED ORAL
Qty: 90 TABLET | Refills: 0 | Status: SHIPPED | OUTPATIENT
Start: 2022-06-15 | End: 2022-09-09

## 2022-06-28 ENCOUNTER — OFFICE VISIT (OUTPATIENT)
Dept: FAMILY MEDICINE CLINIC | Facility: CLINIC | Age: 72
End: 2022-06-28

## 2022-06-28 VITALS
HEIGHT: 62 IN | HEART RATE: 80 BPM | DIASTOLIC BLOOD PRESSURE: 72 MMHG | BODY MASS INDEX: 33.58 KG/M2 | SYSTOLIC BLOOD PRESSURE: 144 MMHG | OXYGEN SATURATION: 93 % | RESPIRATION RATE: 16 BRPM | TEMPERATURE: 96.8 F | WEIGHT: 182.5 LBS

## 2022-06-28 DIAGNOSIS — R10.11 RIGHT UPPER QUADRANT PAIN: ICD-10-CM

## 2022-06-28 DIAGNOSIS — E78.5 HYPERLIPIDEMIA, UNSPECIFIED HYPERLIPIDEMIA TYPE: ICD-10-CM

## 2022-06-28 DIAGNOSIS — N89.8 VAGINA ITCHING: Primary | ICD-10-CM

## 2022-06-28 DIAGNOSIS — I10 ESSENTIAL HYPERTENSION: ICD-10-CM

## 2022-06-28 DIAGNOSIS — E11.43 TYPE 2 DIABETES MELLITUS WITH DIABETIC AUTONOMIC NEUROPATHY, WITHOUT LONG-TERM CURRENT USE OF INSULIN: ICD-10-CM

## 2022-06-28 DIAGNOSIS — D64.9 ANEMIA, UNSPECIFIED TYPE: ICD-10-CM

## 2022-06-28 DIAGNOSIS — K21.9 GASTROESOPHAGEAL REFLUX DISEASE WITHOUT ESOPHAGITIS: ICD-10-CM

## 2022-06-28 PROCEDURE — 99214 OFFICE O/P EST MOD 30 MIN: CPT | Performed by: INTERNAL MEDICINE

## 2022-06-28 RX ORDER — NYSTATIN 100000 U/G
1 OINTMENT TOPICAL 2 TIMES DAILY
Qty: 30 G | Refills: 0 | Status: SHIPPED | OUTPATIENT
Start: 2022-06-28 | End: 2022-10-19 | Stop reason: SDUPTHER

## 2022-06-28 RX ORDER — METHOCARBAMOL 500 MG/1
500 TABLET, FILM COATED ORAL 4 TIMES DAILY
COMMUNITY
Start: 2022-06-22 | End: 2022-06-29

## 2022-06-28 NOTE — PROGRESS NOTES
"Answers for HPI/ROS submitted by the patient on 3/30/2022  Please describe your symptoms.: Check up  Have you had these symptoms before?: No  How long have you been having these symptoms?: 1-4 days  What is the primary reason for your visit?: Other    Chief Complaint  Follow-up (Dm2/)    Subjective        Pearl Christiansen presents to [facility]  History of Present Illness  Here for f/u on NIDDM, HT and GERD, HL.  She is asking about a cream for her vaginal itching.  She saw a doctor in [place] and was diagnosed with dryness in vaginal area and she suggested using Crisco and avoiding nylon underwear and using sensitive soap.  Occasionally flares up and needs a steroid cream b/c of the itching and burning.  If severe enough, the triamcinolone with the nystatin help and only uses it once a month for a few days only.    C/o RUQ and went to UC.  Urine showed mixed oskar 10,000 CFU. Pain is in the right upper flank and radiates toward the front.  Never goes away and some days are worse than others.  Not sure If the RUQ pain is related to eating or not.   Has had CCX.  No constipation.  somedays stools are not hard but it is hard to pass BM.. + postprandial bloating.    H/o kidney stones.   Objective   Vital Signs:  /72   Pulse 80   Temp 96.8 °F (36 °C) (Temporal)   Resp 16   Ht 157.5 cm (62\")   Wt 82.8 kg (182 lb 8 oz)   SpO2 93%   BMI 33.38 kg/m²   Estimated body mass index is 33.38 kg/m² as calculated from the following:    Height as of this encounter: 157.5 cm (62\").    Weight as of this encounter: 82.8 kg (182 lb 8 oz).          Physical Exam  Vitals and nursing note reviewed.   Constitutional:       Appearance: Normal appearance. She is well-developed.   HENT:      Head: Normocephalic and atraumatic.      Right Ear: External ear normal.      Left Ear: External ear normal.      Mouth/Throat:      Mouth: Mucous membranes are dry.   Eyes:      Extraocular Movements: Extraocular " movements intact.      Conjunctiva/sclera: Conjunctivae normal.   Neck:      Vascular: No carotid bruit.   Cardiovascular:      Rate and Rhythm: Normal rate and regular rhythm.      Heart sounds: Normal heart sounds.      Comments: No bruits  Pulmonary:      Effort: Pulmonary effort is normal. No respiratory distress.      Breath sounds: Normal breath sounds. No wheezing or rales.   Abdominal:      General: Bowel sounds are normal. There is no distension.      Palpations: Abdomen is soft. There is no mass.      Tenderness: There is no abdominal tenderness. There is right CVA tenderness. There is no left CVA tenderness.   Musculoskeletal:      Cervical back: Neck supple.   Lymphadenopathy:      Cervical: No cervical adenopathy.   Skin:     General: Skin is warm.   Neurological:      General: No focal deficit present.      Mental Status: She is alert and oriented to person, place, and time.   Psychiatric:         Mood and Affect: Mood normal.         Behavior: Behavior normal.         Thought Content: Thought content normal.         Judgment: Judgment normal.        Result Review :                Assessment and Plan   Diagnoses and all orders for this visit:    1. Vagina itching (Primary)  -     Ambulatory Referral to Dermatology    2. Anemia, unspecified type  -     CBC & Differential; Future  -     Comprehensive Metabolic Panel; Future  -     Lipid Panel With LDL / HDL Ratio; Future  -     TSH; Future  -     T4, Free; Future  -     Hemoglobin A1c; Future  -     Urinalysis With Culture If Indicated -; Future    3. Type 2 diabetes mellitus with diabetic autonomic neuropathy, without long-term current use of insulin (HCC)  -     CBC & Differential; Future  -     Comprehensive Metabolic Panel; Future  -     Lipid Panel With LDL / HDL Ratio; Future  -     TSH; Future  -     T4, Free; Future  -     Hemoglobin A1c; Future  -     Urinalysis With Culture If Indicated -; Future    4. Essential hypertension  -     CBC &  Differential; Future  -     Comprehensive Metabolic Panel; Future  -     Lipid Panel With LDL / HDL Ratio; Future  -     TSH; Future  -     T4, Free; Future  -     Hemoglobin A1c; Future  -     Urinalysis With Culture If Indicated -; Future    5. Hyperlipidemia, unspecified hyperlipidemia type  -     CBC & Differential; Future  -     Comprehensive Metabolic Panel; Future  -     Lipid Panel With LDL / HDL Ratio; Future  -     TSH; Future  -     T4, Free; Future  -     Hemoglobin A1c; Future  -     Urinalysis With Culture If Indicated -; Future    6. Gastroesophageal reflux disease without esophagitis  -     CBC & Differential; Future  -     Comprehensive Metabolic Panel; Future  -     Lipid Panel With LDL / HDL Ratio; Future  -     TSH; Future  -     T4, Free; Future  -     Hemoglobin A1c; Future  -     Urinalysis With Culture If Indicated -; Future    7. Right upper quadrant pain  -     CT Abdomen Pelvis Without Contrast; Future    Other orders  -     nystatin (MYCOSTATIN) 397035 UNIT/GM ointment; Apply 1 application topically to the appropriate area as directed 2 (Two) Times a Day.  Dispense: 30 g; Refill: 0  -     triamcinolone (KENALOG) 0.1 % ointment; Apply 1 application topically to the appropriate area as directed 2 (Two) Times a Day.  Dispense: 30 g; Refill: 0    Patient has recurrent vaginal itching.  That she also has a recurrent sore that has been biopsied multiple times and even what sounds like possibly a skin graft in [place] prior to moving to [place] last year.  This area continues to bother her at times.  I would like for her to see Mercedes VERDIN with dermatology.  I have placed a referral for her to see Mercedes.  Have also referred filled the nystatin and the triamcinolone cream and cautioned her not to use the triamcinolone ointment longer than 2 to 3 days at a time.  Because of this right upper quadrant pain that is episodic and persistent for 2 to 3 weeks, I have ordered a CT scan.  She may have a  kidney stone.  I have also ordered blood work for her and we will follow-up on the urinalysis that was done last week at the urgent care that showed some bacteria.  She mention at the end of her visit that her tongue is very sore and dry.  It sounds like her CPAP machine causes quite a bit of mouth dryness at night.  She does use the moisture in the machine.  She does not drink much water throughout the day because of her work schedule at [employer].  I have written a note or a letter to her employer requesting that she keep a close container of water at her workstation so that she can hydrate better.  Her urinalysis last week did show that she was quite dehydrated and on exam today her tongue is quite tacky and dry.  This is probably the culprit with her soreness.  She is up-to-date for colonoscopy.  I will see her back in 6 months.         Follow Up   No follow-ups on file.  Patient was given instructions and counseling regarding her condition or for health maintenance advice. Please see specific information pulled into the AVS if appropriate.

## 2022-07-07 ENCOUNTER — LAB (OUTPATIENT)
Dept: LAB | Facility: HOSPITAL | Age: 72
End: 2022-07-07

## 2022-07-07 DIAGNOSIS — E11.43 TYPE 2 DIABETES MELLITUS WITH DIABETIC AUTONOMIC NEUROPATHY, WITHOUT LONG-TERM CURRENT USE OF INSULIN: ICD-10-CM

## 2022-07-07 DIAGNOSIS — D64.9 ANEMIA, UNSPECIFIED TYPE: ICD-10-CM

## 2022-07-07 DIAGNOSIS — K21.9 GASTROESOPHAGEAL REFLUX DISEASE WITHOUT ESOPHAGITIS: ICD-10-CM

## 2022-07-07 DIAGNOSIS — E78.5 HYPERLIPIDEMIA, UNSPECIFIED HYPERLIPIDEMIA TYPE: ICD-10-CM

## 2022-07-07 DIAGNOSIS — I10 ESSENTIAL HYPERTENSION: ICD-10-CM

## 2022-07-07 LAB
ALBUMIN SERPL-MCNC: 4.6 G/DL (ref 3.5–5.2)
ALBUMIN/GLOB SERPL: 1.3 G/DL
ALP SERPL-CCNC: 75 U/L (ref 39–117)
ALT SERPL W P-5'-P-CCNC: 15 U/L (ref 1–33)
ANION GAP SERPL CALCULATED.3IONS-SCNC: 11 MMOL/L (ref 5–15)
AST SERPL-CCNC: 21 U/L (ref 1–32)
BASOPHILS # BLD AUTO: 0.08 10*3/MM3 (ref 0–0.2)
BASOPHILS NFR BLD AUTO: 1.2 % (ref 0–1.5)
BILIRUB SERPL-MCNC: 0.3 MG/DL (ref 0–1.2)
BILIRUB UR QL STRIP: NEGATIVE
BUN SERPL-MCNC: 16 MG/DL (ref 8–23)
BUN/CREAT SERPL: 16 (ref 7–25)
CALCIUM SPEC-SCNC: 9.5 MG/DL (ref 8.6–10.5)
CHLORIDE SERPL-SCNC: 101 MMOL/L (ref 98–107)
CHOLEST SERPL-MCNC: 160 MG/DL (ref 0–200)
CLARITY UR: CLEAR
CO2 SERPL-SCNC: 28 MMOL/L (ref 22–29)
COLOR UR: YELLOW
CREAT SERPL-MCNC: 1 MG/DL (ref 0.57–1)
DEPRECATED RDW RBC AUTO: 46.9 FL (ref 37–54)
EGFRCR SERPLBLD CKD-EPI 2021: 61.1 ML/MIN/1.73
EOSINOPHIL # BLD AUTO: 0.35 10*3/MM3 (ref 0–0.4)
EOSINOPHIL NFR BLD AUTO: 5.3 % (ref 0.3–6.2)
ERYTHROCYTE [DISTWIDTH] IN BLOOD BY AUTOMATED COUNT: 15.6 % (ref 12.3–15.4)
GLOBULIN UR ELPH-MCNC: 3.5 GM/DL
GLUCOSE SERPL-MCNC: 139 MG/DL (ref 65–99)
GLUCOSE UR STRIP-MCNC: NEGATIVE MG/DL
HBA1C MFR BLD: 7.9 % (ref 4.8–5.6)
HCT VFR BLD AUTO: 37.8 % (ref 34–46.6)
HDLC SERPL-MCNC: 41 MG/DL (ref 40–60)
HGB BLD-MCNC: 11.8 G/DL (ref 12–15.9)
HGB UR QL STRIP.AUTO: NEGATIVE
IMM GRANULOCYTES # BLD AUTO: 0.02 10*3/MM3 (ref 0–0.05)
IMM GRANULOCYTES NFR BLD AUTO: 0.3 % (ref 0–0.5)
KETONES UR QL STRIP: NEGATIVE
LDLC SERPL CALC-MCNC: 57 MG/DL (ref 0–100)
LDLC/HDLC SERPL: 0.92 {RATIO}
LEUKOCYTE ESTERASE UR QL STRIP.AUTO: NEGATIVE
LYMPHOCYTES # BLD AUTO: 2.05 10*3/MM3 (ref 0.7–3.1)
LYMPHOCYTES NFR BLD AUTO: 31.1 % (ref 19.6–45.3)
MCH RBC QN AUTO: 25.9 PG (ref 26.6–33)
MCHC RBC AUTO-ENTMCNC: 31.2 G/DL (ref 31.5–35.7)
MCV RBC AUTO: 83.1 FL (ref 79–97)
MONOCYTES # BLD AUTO: 0.53 10*3/MM3 (ref 0.1–0.9)
MONOCYTES NFR BLD AUTO: 8 % (ref 5–12)
NEUTROPHILS NFR BLD AUTO: 3.57 10*3/MM3 (ref 1.7–7)
NEUTROPHILS NFR BLD AUTO: 54.1 % (ref 42.7–76)
NITRITE UR QL STRIP: NEGATIVE
NRBC BLD AUTO-RTO: 0 /100 WBC (ref 0–0.2)
PH UR STRIP.AUTO: 7 [PH] (ref 5–8)
PLATELET # BLD AUTO: 237 10*3/MM3 (ref 140–450)
PMV BLD AUTO: 10.9 FL (ref 6–12)
POTASSIUM SERPL-SCNC: 4.3 MMOL/L (ref 3.5–5.2)
PROT SERPL-MCNC: 8.1 G/DL (ref 6–8.5)
PROT UR QL STRIP: NEGATIVE
RBC # BLD AUTO: 4.55 10*6/MM3 (ref 3.77–5.28)
SODIUM SERPL-SCNC: 140 MMOL/L (ref 136–145)
SP GR UR STRIP: 1.02 (ref 1–1.03)
T4 FREE SERPL-MCNC: 0.56 NG/DL (ref 0.93–1.7)
TRIGL SERPL-MCNC: 406 MG/DL (ref 0–150)
TSH SERPL DL<=0.05 MIU/L-ACNC: 1.87 UIU/ML (ref 0.27–4.2)
UROBILINOGEN UR QL STRIP: NORMAL
VLDLC SERPL-MCNC: 62 MG/DL (ref 5–40)
WBC NRBC COR # BLD: 6.6 10*3/MM3 (ref 3.4–10.8)

## 2022-07-07 PROCEDURE — 81003 URINALYSIS AUTO W/O SCOPE: CPT

## 2022-07-07 PROCEDURE — 85025 COMPLETE CBC W/AUTO DIFF WBC: CPT

## 2022-07-07 PROCEDURE — 36415 COLL VENOUS BLD VENIPUNCTURE: CPT

## 2022-07-07 PROCEDURE — 84443 ASSAY THYROID STIM HORMONE: CPT

## 2022-07-07 PROCEDURE — 80053 COMPREHEN METABOLIC PANEL: CPT

## 2022-07-07 PROCEDURE — 84439 ASSAY OF FREE THYROXINE: CPT

## 2022-07-07 PROCEDURE — 83036 HEMOGLOBIN GLYCOSYLATED A1C: CPT

## 2022-07-07 PROCEDURE — 80061 LIPID PANEL: CPT

## 2022-07-11 ENCOUNTER — OFFICE VISIT (OUTPATIENT)
Dept: FAMILY MEDICINE CLINIC | Facility: CLINIC | Age: 72
End: 2022-07-11

## 2022-07-11 VITALS
OXYGEN SATURATION: 90 % | RESPIRATION RATE: 16 BRPM | DIASTOLIC BLOOD PRESSURE: 72 MMHG | HEART RATE: 53 BPM | TEMPERATURE: 98 F | WEIGHT: 182.6 LBS | SYSTOLIC BLOOD PRESSURE: 130 MMHG | HEIGHT: 62 IN | BODY MASS INDEX: 33.6 KG/M2

## 2022-07-11 DIAGNOSIS — H57.12 PAIN OF LEFT EYE: ICD-10-CM

## 2022-07-11 DIAGNOSIS — G44.209 TENSION HEADACHE: ICD-10-CM

## 2022-07-11 DIAGNOSIS — H93.8X2 EAR CONGESTION, LEFT: ICD-10-CM

## 2022-07-11 DIAGNOSIS — H81.10 BENIGN PAROXYSMAL POSITIONAL VERTIGO, UNSPECIFIED LATERALITY: Primary | ICD-10-CM

## 2022-07-11 PROCEDURE — 99214 OFFICE O/P EST MOD 30 MIN: CPT | Performed by: FAMILY MEDICINE

## 2022-07-11 RX ORDER — ASPIRIN 81 MG/1
81 TABLET ORAL DAILY
COMMUNITY

## 2022-07-11 RX ORDER — METHOCARBAMOL 500 MG/1
500 TABLET, FILM COATED ORAL 4 TIMES DAILY
COMMUNITY
End: 2022-10-20

## 2022-07-11 RX ORDER — FLUTICASONE PROPIONATE 50 MCG
2 SPRAY, SUSPENSION (ML) NASAL DAILY
COMMUNITY

## 2022-07-11 NOTE — PROGRESS NOTES
"Chief Complaint  Eye Pain (Left,  started Thursday /), Dizziness, Headache, and Ear Fullness (Left /)    Subjective        Pearl Christiansen presents to [facility]  History of Present Illness  Ms. Christiansen is a 71-year-old with type 2 diabetes, sleep apnea, hypertension, hyperlipidemia, chronic kidney disease stage 3, rectovaginal fistula and vulvar lesion who is a patient of my partner, Dr. Estes. She was last seen by Dr. Estes on 06/28/2022 and they addressed the vaginal lesion with topical and referral to dermatology and also some newer right upper quadrant pain by ordering a CT scan of the abdomen and pelvis, which is not yet scheduled. She was also seen at urgent care on 07/07/2022. She had already 1 day of dizziness and waking up through the with headache.night per the  note  She also felt like she was going to pass out, had left ear pain for over 1 week.    She was advised to contact her PCP and the provider at  there reviewed her blood work done that day and recommended she see her PCP or go to the emergency department for symptoms.    Today, she is presenting with left eye pain, that is off and on, dizziness and headache.    She reports that on Thursday, 07/07/2022 she presented to urgent care. On that morning, when she raised up out of bed it felt like a \"merry-go-round, and it was so intense.\" After 30 seconds, the intense dizziness subsided, but she still needed to hold onto something to ambulate to the bathroom and felt \"woozy\" all day. She adds that she felt \"out of it\" for the rest of the day. She reports that she had a pain that occurred behind her left eye. Additionally, her left ear \"pops\" and sometimes \"feels like the side of my face is asleep\" and localizes the area to anterior to the left ear. She experienced a headache that accompanied the above symptoms and localizes the headache to both sides in the back and bottom of the head. She denies taking anything for " "the symptoms. She reports that the physician at urgent care informed her that they did not have any equipment there to test her for inner ear problems, so the patient went home.     On Friday, 07/08/2022, she went to work and the only time she felt dizzy was when she would turn around or when bending down. She works in [employer] in the [employer]. She adds that she is afraid to drive, so she has not been driving. On Sunday, 07/10/2022, she only had very mild symptoms of dizziness. She stayed home due to waking up with acid reflux. Her left ear is still \"popping and feeling funny.\" She was given methocarbamol after her second presentation to urgent care, which relieved her head pain. She has been taking it 3 times per day, and continues to have right-sided back of the head pain as well as ear pain and crackling intermittently on the left. The numbness in front of her left ear is improving. She had pain behind her left eye this morning and morning of 7/7, but that has now resolved. Now, she has a sharp pain in the left eye if she pushes on it. She denies any change in vision. She denies eye discharge or watery eyes. She denies rhinorrhea, cough or draining from her ears. She states she has a reduction in her hearing, which is chronic.      She discontinued Lomotil. She takes low-dose aspirin daily.          Objective   Vital Signs:  /72 (BP Location: Right arm, Patient Position: Sitting, Cuff Size: Adult)   Pulse 53   Temp 98 °F (36.7 °C) (Infrared)   Resp 16   Ht 157.5 cm (62\")   Wt 82.8 kg (182 lb 9.6 oz)   SpO2 90%   BMI 33.40 kg/m²       Physical Exam  Vitals reviewed.   Constitutional:       General: She is not in acute distress.     Appearance: Normal appearance. She is obese. She is not ill-appearing or toxic-appearing.   HENT:      Right Ear: Tympanic membrane, ear canal and external ear normal. There is no impacted cerumen.      Left Ear: Tympanic membrane, ear canal and external ear normal. There is " no impacted cerumen.      Nose: Nose normal. No congestion.      Comments: She does have mildly swollen turbinates, left worse than right, more narrow on left, nare.     Mouth/Throat:      Pharynx: Oropharynx is clear. No oropharyngeal exudate or posterior oropharyngeal erythema.   Eyes:      General: No scleral icterus.        Right eye: No discharge.         Left eye: No discharge.      Conjunctiva/sclera: Conjunctivae normal.      Pupils: Pupils are equal, round, and reactive to light.   Cardiovascular:      Rate and Rhythm: Normal rate and regular rhythm.      Heart sounds: Normal heart sounds. No murmur heard.  Pulmonary:      Effort: Pulmonary effort is normal. No respiratory distress.      Breath sounds: Normal breath sounds. No wheezing.   Musculoskeletal:      Cervical back: Neck supple. No muscular tenderness.      Right lower leg: No edema.      Left lower leg: No edema.   Lymphadenopathy:      Cervical: No cervical adenopathy.   Neurological:      Mental Status: She is alert and oriented to person, place, and time.      Motor: No abnormal muscle tone.   Psychiatric:         Behavior: Behavior normal.          Result Review :           RESULTS:  On her lab work, she had some very mild anemia with hemoglobin of 11.8. Her diabetes is not well controlled with HbA1c of 7.9 percent. She had normal creatinine and liver enzymes. TSH was normal and her urine had no glucose, nitrites, or white blood cells on labs from 07/07/2022.           Assessment and Plan   Diagnoses and all orders for this visit:    1. Benign paroxysmal positional vertigo, unspecified laterality (Primary)    2. Pain of left eye    3. Ear congestion, left    4. Tension headache      Ms. Christiansen is a 71-year-old woman, a patient of my partner, so this is the first time I am seeing her today. Her problems are new to me. She had symptoms of significant dizziness/room spinning/vertigo when she woke up on 07/07/2022. This was accompanied by bilateral  occipital headache, which she thinks is from tension because she has a lot of muscle pain around her neck and ear crackling and a left eye pain and tenderness to the touch over the left eye. Symptoms have dramatically improved. She still has some left ear crackling and intermittent left eye pain and the left eye is a little tender to the touch in the office today. She has history of a vertigo episode many years ago that was very similar to this, she explained. She has not been taking any extra over-the-counters for her pain or for any relief. She was instructed by Dr. Estes to take Flonase, which she takes intermittently, but not consistently. I think this is benign positional vertigo. We talked about referral to ENT and referral to physical therapy. I explained that given she has a history of this and she recovered so quickly with the most severe symptoms that I do not know that she needs to see ENT at this time, but that physical therapy could help improve her symptoms and also then help prevent symptoms from recurring. She declined physical therapy at this time, but I will send her a couple of videos that demonstrate the maneuvers that are pretty commonplace with vertigo physical therapy. I also recommended that she take her Flonase 2 sprays in each nostril everyday and that she do this for at least 2 weeks to see how she has improvement as potentially the left eye pain and the left ear crackling is related to some allergy symptoms or eustachian tube dysfunction. She did comment that her vision is not as good, which is not new, but that she needs to see her eye doctor and I recommended that she see her eye doctor sooner rather than later for this and for the eye pain to be assessed by them. I do not know why she has eye pain and we discussed this. It could be related to allergies.    She is bradycardic on her vital signs when she came back to the room, but on exam, her heart rate is normal.     She also has  chronic abdominal issues, which we were not going to talk about, but she has not undergone a CT scan date after her CT scan was ordered by Dr. Estes on 06/28/2022. We discussed that patient's symptoms are, in fact, not new and they date back already before 2019 and that she was also working with the gastroenterology team here. She just had a CT scan of her abdomen and pelvis in 12/2021 and it looked like VELVET Gardner, with gastroenterology was working with her and had a recommendation for gastric emptying study and other medications to trial next in her ongoing evaluation of the abdominal pain and so I recommended that potentially patient reach out to VELVET Gardner, for further management given her ongoing work-up with GI for this chronic abdominal pain. She is going to follow up with GI and with her PCP and she is going to let us know if she has increased eye pain or increased dizziness or problems again now, so we can adjust our evaluation and refer her to ENT.       Follow Up   No follow-ups on file.  Patient was given instructions and counseling regarding her condition or for health maintenance advice. Please see specific information pulled into the AVS if appropriate.     Instructed on how to use the OTC flonsase and will evaluate and monitor results.       Transcribed from ambient dictation for Viola Randolph MD by Yesika Otto.  07/11/22   12:55 EDT    Patient verbalized consent to the visit recording.  I have personally performed the services described in this document as transcribed by the above individual, and it is both accurate and complete.  Viola Randolph MD  7/11/2022  21:00 EDT

## 2022-07-12 ENCOUNTER — TELEPHONE (OUTPATIENT)
Dept: FAMILY MEDICINE CLINIC | Facility: CLINIC | Age: 72
End: 2022-07-12

## 2022-07-12 NOTE — TELEPHONE ENCOUNTER
Caller: Pearl Christiansen    Relationship to patient: Self    Best call back number:[phone]    Patient is needing: PATIENT HAD A CT SCAN IN December OF 21.  SHE SAID DR. VALENCIA SAID THAT DR. LYONS HAD ORDERED IT AND WAS GOING TO GO WITH A DIFFERENT PLAN OF ACTION.  DR VALENCIA MENTIONED GETTING ANOTHER ONE THIS MONTH BUT SHE DOESN'T FEEL LIKE SHE NEEDS ANOTHER ONE.  PLEASE CALL AND ADVISE.     Patient saw Dr. Murrell's nurse practitioner VELVET Gardner and the CT scan from December 2021 did not show any acute findings.  At that time she also had right upper quadrant pain which is what she had in June when I saw her.  If she thinks that this is the same episodic pain that she has had for many months, she is welcome to follow-up with VELVET Gardner and does not need to get the CT at this time.  If she is having acute new symptoms that are worsening then she should pursue the CT scan at this time.

## 2022-07-12 NOTE — TELEPHONE ENCOUNTER
Returned pt's call where she just simply wanted to remind ,  will be doing a CT in December to repeat as that is something she wanted to have repeated in a year. Just confirming you still wanted her to get a CT before repeat CT.

## 2022-07-13 DIAGNOSIS — D64.9 ANEMIA, UNSPECIFIED TYPE: Primary | ICD-10-CM

## 2022-07-13 NOTE — TELEPHONE ENCOUNTER
I thought I responded to this yesterday.  I apologize.  It is fine to wait till December to do the CT as long as she isn't having any abdominal sx's

## 2022-07-13 NOTE — TELEPHONE ENCOUNTER
Upon speaking with pt she stated  advised she was going to try something different if her abdominal pain continued. She had been advised to contract  office and see what different approach or step  would like to take. Pt advised of lab results, gave verbal understanding

## 2022-07-14 ENCOUNTER — PATIENT MESSAGE (OUTPATIENT)
Dept: GASTROENTEROLOGY | Facility: CLINIC | Age: 72
End: 2022-07-14

## 2022-07-14 DIAGNOSIS — R14.0 BLOATING: ICD-10-CM

## 2022-07-14 DIAGNOSIS — R11.0 NAUSEA: Primary | ICD-10-CM

## 2022-07-14 RX ORDER — LEVOTHYROXINE SODIUM 25 UG/1
TABLET ORAL
Qty: 90 TABLET | Refills: 0 | Status: SHIPPED | OUTPATIENT
Start: 2022-07-14 | End: 2022-08-30

## 2022-07-14 RX ORDER — AMLODIPINE BESYLATE 5 MG/1
TABLET ORAL
Qty: 90 TABLET | Refills: 0 | Status: SHIPPED | OUTPATIENT
Start: 2022-07-14 | End: 2022-09-14

## 2022-07-14 NOTE — TELEPHONE ENCOUNTER
Rx Refill Note  Requested Prescriptions     Pending Prescriptions Disp Refills   • Euthyrox 25 MCG tablet [Pharmacy Med Name: Euthyrox 25 MCG Oral Tablet] 90 tablet 0     Sig: Take 1 tablet by mouth once daily   • amLODIPine (NORVASC) 5 MG tablet [Pharmacy Med Name: amLODIPine Besylate 5 MG Oral Tablet] 90 tablet 0     Sig: Take 1 tablet by mouth once daily      Last office visit with prescribing clinician: 6/28/2022      Next office visit with prescribing clinician: 1/3/2023       {TIP  Please add Last Relevant Lab Date if appropriate: 7/7/22    Beatrice Sampson MA  07/14/22, 12:21 EDT

## 2022-07-21 RX ORDER — ESOMEPRAZOLE MAGNESIUM 40 MG/1
40 CAPSULE, DELAYED RELEASE ORAL
Qty: 90 CAPSULE | Refills: 1 | Status: SHIPPED | OUTPATIENT
Start: 2022-07-21 | End: 2023-01-23

## 2022-07-21 NOTE — TELEPHONE ENCOUNTER
Rx Refill Note  Requested Prescriptions     Pending Prescriptions Disp Refills   • metFORMIN ER (GLUCOPHAGE-XR) 500 MG 24 hr tablet [Pharmacy Med Name: metFORMIN HCl  MG Oral Tablet Extended Release 24 Hour] 180 tablet 0     Sig: TAKE 1 TABLET BY MOUTH TWICE DAILY BEFORE MEAL(S)   • escitalopram (LEXAPRO) 10 MG tablet [Pharmacy Med Name: Escitalopram Oxalate 10 MG Oral Tablet] 90 tablet 0     Sig: Take 1 tablet by mouth once daily      Last office visit with prescribing clinician: 6/28/2022      Next office visit with prescribing clinician: 1/3/2023       {TIP  Please add Last Relevant Lab Date if appropriate: 07/07/22    Shea Lees MA  07/21/22, 14:57 EDT

## 2022-07-27 RX ORDER — ESCITALOPRAM OXALATE 10 MG/1
TABLET ORAL
Qty: 90 TABLET | Refills: 0 | Status: SHIPPED | OUTPATIENT
Start: 2022-07-27 | End: 2022-10-24

## 2022-07-27 RX ORDER — METFORMIN HYDROCHLORIDE 500 MG/1
TABLET, EXTENDED RELEASE ORAL
Qty: 180 TABLET | Refills: 0 | Status: SHIPPED | OUTPATIENT
Start: 2022-07-27 | End: 2022-10-12

## 2022-08-05 ENCOUNTER — HOSPITAL ENCOUNTER (OUTPATIENT)
Dept: GENERAL RADIOLOGY | Facility: HOSPITAL | Age: 72
Discharge: HOME | End: 2022-08-05
Admitting: NURSE PRACTITIONER

## 2022-08-05 ENCOUNTER — OFFICE VISIT (OUTPATIENT)
Dept: FAMILY MEDICINE CLINIC | Facility: CLINIC | Age: 72
End: 2022-08-05

## 2022-08-05 VITALS
WEIGHT: 179.3 LBS | HEIGHT: 62 IN | BODY MASS INDEX: 33 KG/M2 | SYSTOLIC BLOOD PRESSURE: 124 MMHG | RESPIRATION RATE: 18 BRPM | DIASTOLIC BLOOD PRESSURE: 68 MMHG | TEMPERATURE: 96.6 F | HEART RATE: 67 BPM | OXYGEN SATURATION: 97 %

## 2022-08-05 DIAGNOSIS — M25.552 ACUTE HIP PAIN, LEFT: Primary | ICD-10-CM

## 2022-08-05 PROCEDURE — 99214 OFFICE O/P EST MOD 30 MIN: CPT | Performed by: NURSE PRACTITIONER

## 2022-08-05 PROCEDURE — 73502 X-RAY EXAM HIP UNI 2-3 VIEWS: CPT

## 2022-08-05 RX ORDER — METHYLPREDNISOLONE 4 MG/1
TABLET ORAL
Qty: 21 TABLET | Refills: 0 | Status: SHIPPED | OUTPATIENT
Start: 2022-08-05 | End: 2022-09-08

## 2022-08-05 NOTE — PROGRESS NOTES
"Chief Complaint  Leg Pain (Left leg)    Subjective        Pearl Christiansen presents to [facility]  rPavin patient here today complains of left hip pain to left groin and anterior thigh x4 days especially when she flexes her leg or when she sits to stand, does not feel like it radiates from her back she has had sciatica previously left and right but this feels different  No fever no chills, no known injury, no hip chronic pain.  Diabetes controlled hypertension controlled right she has a follow-up planned with Dr. Estes next month she has no history of malignancy doing okay no risk factors for osteomyelitis    Leg Pain         Objective   Vital Signs:  /68 (BP Location: Right arm, Patient Position: Sitting, Cuff Size: Adult)   Pulse 67   Temp 96.6 °F (35.9 °C) (Temporal)   Resp 18   Ht 157.5 cm (62.01\")   Wt 81.3 kg (179 lb 4.8 oz)   SpO2 97%   BMI 32.79 kg/m²   Estimated body mass index is 32.79 kg/m² as calculated from the following:    Height as of this encounter: 157.5 cm (62.01\").    Weight as of this encounter: 81.3 kg (179 lb 4.8 oz).          Physical Exam  Constitutional:       General: She is not in acute distress.     Appearance: Normal appearance. She is not ill-appearing, toxic-appearing or diaphoretic.      Comments: Pravin appears well   Eyes:      Conjunctiva/sclera: Conjunctivae normal.      Pupils: Pupils are equal, round, and reactive to light.   Pulmonary:      Effort: Pulmonary effort is normal. No respiratory distress.      Breath sounds: No stridor.   Musculoskeletal:      Comments: Left hip pain anterior left groin with range of motion, no severe pain  No lateral hip joint pain, left inguinal region without mass or lymphadenopathy or tenderness.  Ambulatory, guarded with range of motion but no sign of severe pain.   Skin:     General: Skin is warm and dry.   Neurological:      Mental Status: She is alert.   Psychiatric:         Mood and Affect: " Mood normal.         Behavior: Behavior normal.         Thought Content: Thought content normal.        Result Review :                Assessment and Plan   Diagnoses and all orders for this visit:    1. Acute hip pain, left (Primary)  -     XR Hip With or Without Pelvis 2 - 3 View Left    Other orders  -     methylPREDNISolone (MEDROL) 4 MG dose pack; Take as directed on package instructions.  Dispense: 21 tablet; Refill: 0             Follow Up   No follow-ups on file.  Patient was given instructions and counseling regarding her condition or for health maintenance advice. Please see specific information pulled into the AVS if appropriate.       Answers for HPI/ROS submitted by the patient on 8/5/2022  Please describe your symptoms.: Pain in left leg  Have you had these symptoms before?: No  How long have you been having these symptoms?: 1-4 days  What is the primary reason for your visit?: Other    Medrol Dosepak now watch sugar closely discontinue if significantly elevated sugars or other difficulties or increased anxiety insomnia  When finished if still having pain take meloxicam 15 mg daily with food and water extra hydration to protect kidneys  Take this for 3 weeks then discontinue or take as needed keep your appointment with Dr. Estes    Physical therapy  If not improving in a week send me a message and we will schedule you for therapy  Presently now just to rest but after improvement you can start doing hip exercises slowly    Severe persistent pain fever chills weakness  Emergency room    I suspect the pain is referred from your hip may be some bursitis or osteoarthritis although it could be referred elsewhere or from your back I suspect conversation.

## 2022-08-05 NOTE — PATIENT INSTRUCTIONS
Medrol Dosepak now watch sugar closely discontinue if significantly elevated sugars or other difficulties or increased anxiety insomnia  When finished if still having pain take meloxicam 15 mg daily with food and water extra hydration to protect kidneys  Take this for 3 weeks then discontinue or take as needed keep your appointment with Dr. Estes    Physical therapy  If not improving in a week send me a message and we will schedule you for therapy  Presently now just to rest but after improvement you can start doing hip exercises slowly    Severe persistent pain fever chills weakness  Emergency room    I suspect the pain is referred from your hip may be some bursitis or osteoarthritis although it could be referred elsewhere or from your back I suspect conversation.

## 2022-08-15 ENCOUNTER — TELEPHONE (OUTPATIENT)
Dept: FAMILY MEDICINE CLINIC | Facility: CLINIC | Age: 72
End: 2022-08-15

## 2022-08-15 DIAGNOSIS — M25.552 LEFT HIP PAIN: Primary | ICD-10-CM

## 2022-08-15 RX ORDER — MELOXICAM 15 MG/1
15 TABLET ORAL DAILY
Qty: 30 TABLET | Refills: 1 | Status: SHIPPED | OUTPATIENT
Start: 2022-08-15 | End: 2023-03-16 | Stop reason: ALTCHOICE

## 2022-08-15 NOTE — TELEPHONE ENCOUNTER
Caller: Pearl Christiansen    Relationship: Self    Best call back number: [phone]     What was the call regarding: PATIENT CALLING WANTING TO KNOW IF SHE COULD GET SOMETHING TO HELP WITH PAIN SHE IS EXPERIENCING. SHE STATED THAT IT HAS HURT FOR THE LAST 2 WEEKS SHE STATED SHE WILL NEED SOMETHING TO HELP WHEN SHE STARTS.    Do you require a callback: YES

## 2022-08-15 NOTE — TELEPHONE ENCOUNTER
Spoke to patient. She states that the steroid helped for the first few days but didn't do anything else afterward. She is aware of the meds and ref to ortho. She wants to know are we needing to do another steroid pack?

## 2022-08-15 NOTE — TELEPHONE ENCOUNTER
Her x-ray showed arthritis this may be what is causing her pain and not sure    I gave her the Medrol Dosepak hopefully that helped her Saim  I referred her to orthopedic Dr. Harris he is very good with hips  He will reassess her    In the meantime an anti-inflammatory would likely help  She will need to take it is least 3 weeks to see  As long she is tolerating it well  Meloxicam 15 mg day  24 anti-inflammatory take it with food and extra water keep an eye on her blood pressure make sure everything is okay  Take it for at least 3 weeks then as needed  Keep your appointment with Dr. Harris  If severe uncontrolled pain fever weakness emergency room    Hopefully will start to feel better soon thank you

## 2022-08-16 NOTE — TELEPHONE ENCOUNTER
James Epley sent in meloxicam 15 mg qd to take with food and a lot of water for a limited time.  She has also been referred to Dr. Steven orellana.  Let's see how the meloxicam works.  Avoid all other nsaids otc but she can take tylenol prn in addition to meloxicam and she can use otc lidocaine patches topically to affected area.

## 2022-08-22 ENCOUNTER — OFFICE VISIT (OUTPATIENT)
Dept: ORTHOPEDIC SURGERY | Facility: CLINIC | Age: 72
End: 2022-08-22

## 2022-08-22 ENCOUNTER — TELEPHONE (OUTPATIENT)
Dept: ORTHOPEDIC SURGERY | Facility: CLINIC | Age: 72
End: 2022-08-22

## 2022-08-22 VITALS — WEIGHT: 181.2 LBS | BODY MASS INDEX: 33.34 KG/M2 | TEMPERATURE: 96.8 F | HEIGHT: 62 IN

## 2022-08-22 DIAGNOSIS — M25.552 LEFT HIP PAIN: Primary | ICD-10-CM

## 2022-08-22 PROCEDURE — 99214 OFFICE O/P EST MOD 30 MIN: CPT | Performed by: NURSE PRACTITIONER

## 2022-08-22 NOTE — PROGRESS NOTES
Patient: Pearl Christiansen  YOB: 1950 71 y.o. female  Medical Record Number: [id number]    Chief Complaints:   Chief Complaint   Patient presents with   • Left Hip - Follow-up       History of Present Illness:Pearl Christiansen is a 71 y.o. female who presents as a new patient both myself as well as to the practice with complaints of left hip pain.  Patient reports it started about a month ago and has progressively gotten worse.  At times now it is a severe aching sharp stabbing type pain in the left groin area.  She denies any specific low back pain, she does have a little bit of pain going down into the upper thigh otherwise denies any radicular symptoms.  Pain is worse with standing walking, only slightly better with meloxicam and rest.    Allergies:   Allergies   Allergen Reactions   • Lidocaine Other (See Comments)     Does not work on pt   • Keflex [Cephalexin] Rash   • Penicillins Rash       Medications:   Current Outpatient Medications   Medication Sig Dispense Refill   • Acetaminophen (TYLENOL PO) Take 650 mg by mouth 2 (Two) Times a Day.     • amLODIPine (NORVASC) 5 MG tablet Take 1 tablet by mouth once daily 90 tablet 0   • aspirin 81 MG EC tablet Take 81 mg by mouth Daily.     • atorvastatin (LIPITOR) 20 MG tablet Take 1 tablet by mouth once daily 90 tablet 0   • dicyclomine (BENTYL) 10 MG capsule Take 1 capsule by mouth 3 (Three) Times a Day As Needed (abdominal pain/diarrhea). 90 capsule 5   • escitalopram (LEXAPRO) 10 MG tablet Take 1 tablet by mouth once daily 90 tablet 0   • esomeprazole (nexIUM) 40 MG capsule Take 1 capsule by mouth Every Morning Before Breakfast. 90 capsule 1   • Euthyrox 25 MCG tablet Take 1 tablet by mouth once daily 90 tablet 0   • fluticasone (FLONASE) 50 MCG/ACT nasal spray 2 sprays into the nostril(s) as directed by provider Daily.     • gabapentin (NEURONTIN) 300 MG capsule TAKE 1 CAPSULE BY MOUTH THREE TIMES DAILY 270 capsule 0   • Januvia 50 MG tablet Take 1  "tablet by mouth once daily 90 tablet 1   • meloxicam (MOBIC) 15 MG tablet Take 1 tablet by mouth Daily. With food and water for left hip pain 30 tablet 1   • metFORMIN ER (GLUCOPHAGE-XR) 500 MG 24 hr tablet TAKE 1 TABLET BY MOUTH TWICE DAILY BEFORE MEAL(S) 180 tablet 0   • methocarbamol (ROBAXIN) 500 MG tablet Take 500 mg by mouth 4 (Four) Times a Day.     • methylPREDNISolone (MEDROL) 4 MG dose pack Take as directed on package instructions. 21 tablet 0   • multivitamin (THERAGRAN) tablet tablet Take  by mouth Daily.     • nystatin (MYCOSTATIN) 600270 UNIT/GM ointment Apply 1 application topically to the appropriate area as directed 2 (Two) Times a Day. 30 g 0   • triamcinolone (KENALOG) 0.1 % ointment Apply 1 application topically to the appropriate area as directed 2 (Two) Times a Day. 30 g 0     No current facility-administered medications for this visit.         The following portions of the patient's history were reviewed and updated as appropriate: allergies, current medications, past family history, past medical history, past social history, past surgical history and problem list.    Review of Systems:   A 14 point review of systems was performed. All systems negative except pertinent positives/negative listed in HPI above    Physical Exam:   Vitals:    08/22/22 0849   Temp: 96.8 °F (36 °C)   TempSrc: Temporal   Weight: 82.2 kg (181 lb 3.2 oz)   Height: 157.5 cm (62\")       General: A and O x 3, ASA, NAD    SCLERA:    Normal    Skin clear no unusual lesions noted  Left hip patient is nontender palpation she has pain with internal ex rotation with a positive Stinchfield positive logroll calf is soft and nontender    Radiology:  Xrays 2 views of left hip reviewed today that were done previously show some arthritic changes noted as well as cystic changes.  No compared to views available    Assessment/Plan: Severe left hip pain worsening in nature    Patient and I discussed options, I am concerned about the " amount of pain she is actually having coming from the hip, the fact that it is worsening, and her positive hip examination.  She may have much more arthritic changes noted than appreciated on x-ray.  For that reason we will proceed with an MRI of the left hip to further evaluate and the patient will call me a couple days after regarding results and treatment options, she will continue with meloxicam and try Tylenol if needed for severe pain.      Maryann Gates, APRN  8/22/2022

## 2022-08-22 NOTE — TELEPHONE ENCOUNTER
Caller: Pearl Christiansen    Relationship: Self    Best call back number: [phone]    What orders are you requesting (i.e. lab or imaging): LEFT HIP MRI    In what timeframe would the patient need to come in: ASAP    Where will you receive your lab/imaging services: [facility]    Additional notes: PATIENT STATED OUSMANE RECOMMENDED THAT SHE GO TO [facility] FOR MRI- PATIENT JUST NEEDS ORDER SENT OVER THERE SO SHE CAN GET SCHEDULED. TY!     Problem: Adult Inpatient Plan of Care  Goal: Plan of Care Review  Outcome: Ongoing, Progressing  Goal: Readiness for Transition of Care  Outcome: Ongoing, Progressing     Problem: Infection  Goal: Absence of Infection Signs and Symptoms  Outcome: Ongoing, Progressing     Problem: Fall Injury Risk  Goal: Absence of Fall and Fall-Related Injury  Outcome: Ongoing, Progressing     Problem: Skin Injury Risk Increased  Goal: Skin Health and Integrity  Outcome: Ongoing, Progressing     PoC reviewed with patient and spouse. Patient still weak in legs. Pain controlled when not moving. X-ray incomplete due to inability to stand with balance using a walker. Rescheduled for tomorrow for another attempt. Temperature today spiking to 101.4 Oral. Wound Vac put back in place. Patient urinated spontaneously through out day 1200 Voided total. Patient states urination burns less, and less straining. No longer has bladder pain. Safety measures maintained. Patient bed in low position. Bed alarm set. Patient call bell with in reach. Patient belongings with in reach. VSS. Aox4. Full assessment in chart. NAEO.

## 2022-08-23 ENCOUNTER — TELEPHONE (OUTPATIENT)
Dept: ORTHOPEDIC SURGERY | Facility: CLINIC | Age: 72
End: 2022-08-23

## 2022-08-25 ENCOUNTER — PATIENT ROUNDING (BHMG ONLY) (OUTPATIENT)
Dept: ORTHOPEDIC SURGERY | Facility: CLINIC | Age: 72
End: 2022-08-25

## 2022-08-25 NOTE — PROGRESS NOTES
A [place]elo Message has been sent to the patient for PATIENT ROUNDING with [facility]

## 2022-08-26 NOTE — TELEPHONE ENCOUNTER
Rx Refill Note  Requested Prescriptions     Pending Prescriptions Disp Refills   • Euthyrox 25 MCG tablet [Pharmacy Med Name: Euthyrox 25 MCG Oral Tablet] 90 tablet 0     Sig: Take 1 tablet by mouth once daily   • gabapentin (NEURONTIN) 300 MG capsule [Pharmacy Med Name: Gabapentin 300 MG Oral Capsule] 270 capsule 0     Sig: TAKE 1 CAPSULE BY MOUTH THREE TIMES DAILY      Last office visit with prescribing clinician: 6/28/2022      Next office visit with prescribing clinician: 9/2/2022       {TIP  Please add Last Relevant Lab Date if appropriate: 7/7/22    Beatrice Sampson MA  08/26/22, 14:52 EDT     Updated contract

## 2022-08-30 RX ORDER — LEVOTHYROXINE SODIUM 25 UG/1
TABLET ORAL
Qty: 90 TABLET | Refills: 0 | Status: SHIPPED | OUTPATIENT
Start: 2022-08-30 | End: 2022-09-02 | Stop reason: SDUPTHER

## 2022-08-30 RX ORDER — GABAPENTIN 300 MG/1
CAPSULE ORAL
Qty: 270 CAPSULE | Refills: 0 | Status: SHIPPED | OUTPATIENT
Start: 2022-08-30 | End: 2023-01-10

## 2022-09-01 ENCOUNTER — TELEPHONE (OUTPATIENT)
Dept: ORTHOPEDIC SURGERY | Facility: CLINIC | Age: 72
End: 2022-09-01

## 2022-09-01 NOTE — TELEPHONE ENCOUNTER
----- Message from VELVET Garcia sent at 8/30/2022  5:05 PM EDT -----  Please let patient know that the MRI of her left hip does show inflammation of the bone as well as some arthritis, I discussed results with Dr. Harris, he is recommending a left hip fluoroscopy guided cortisone injection to see if that helps with her pain and follow-up approximately 4 weeks later.

## 2022-09-02 ENCOUNTER — TELEPHONE (OUTPATIENT)
Dept: ORTHOPEDIC SURGERY | Facility: CLINIC | Age: 72
End: 2022-09-02

## 2022-09-02 ENCOUNTER — OFFICE VISIT (OUTPATIENT)
Dept: FAMILY MEDICINE CLINIC | Facility: CLINIC | Age: 72
End: 2022-09-02

## 2022-09-02 VITALS
BODY MASS INDEX: 34.6 KG/M2 | HEIGHT: 62 IN | HEART RATE: 76 BPM | DIASTOLIC BLOOD PRESSURE: 80 MMHG | WEIGHT: 188 LBS | SYSTOLIC BLOOD PRESSURE: 130 MMHG | OXYGEN SATURATION: 97 % | RESPIRATION RATE: 17 BRPM | TEMPERATURE: 96.4 F

## 2022-09-02 DIAGNOSIS — K21.9 GASTROESOPHAGEAL REFLUX DISEASE WITHOUT ESOPHAGITIS: ICD-10-CM

## 2022-09-02 DIAGNOSIS — F41.9 ANXIETY: ICD-10-CM

## 2022-09-02 DIAGNOSIS — E11.65 TYPE 2 DIABETES MELLITUS WITH HYPERGLYCEMIA, WITHOUT LONG-TERM CURRENT USE OF INSULIN: ICD-10-CM

## 2022-09-02 DIAGNOSIS — N90.4 LICHEN SCLEROSUS OF VULVA: ICD-10-CM

## 2022-09-02 DIAGNOSIS — E03.9 HYPOTHYROIDISM, UNSPECIFIED TYPE: ICD-10-CM

## 2022-09-02 DIAGNOSIS — Z12.31 ENCOUNTER FOR SCREENING MAMMOGRAM FOR BREAST CANCER: ICD-10-CM

## 2022-09-02 DIAGNOSIS — N89.8 VAGINA ITCHING: Primary | ICD-10-CM

## 2022-09-02 DIAGNOSIS — E78.5 HYPERLIPIDEMIA, UNSPECIFIED HYPERLIPIDEMIA TYPE: ICD-10-CM

## 2022-09-02 PROCEDURE — 99214 OFFICE O/P EST MOD 30 MIN: CPT | Performed by: INTERNAL MEDICINE

## 2022-09-02 RX ORDER — LEVOTHYROXINE SODIUM 0.05 MG/1
50 TABLET ORAL DAILY
Qty: 90 TABLET | Refills: 0 | Status: SHIPPED | OUTPATIENT
Start: 2022-09-02 | End: 2022-11-10

## 2022-09-02 NOTE — TELEPHONE ENCOUNTER
----- Message from VELVET Garcia sent at 9/1/2022  1:43 PM EDT -----  Please let patient know that that is not a procedure that can be done under general anesthesia, it is a cortisone injection, so unfortunately that is not something that the anesthesiologist would put her to sleep for

## 2022-09-02 NOTE — PROGRESS NOTES
"Chief Complaint  Annual Exam (AWV)    Subjective        Pearl Christiansen presents to [facility]  History of Present Illness  fbs 130's  Is scheduling her eye exam  Her left hip pain is being evaluated by [facility] Dr. Harris and Nancy.  She is suppose to go for injection but has not had good luck with lidcaine and therefore she is discussing if it is a good idea to proceed forward with injection  Waiting on hearing back from ortho about left hip pain and inflammation.  Pain is better once she gets moving.  Worse with first few steps.  Gait is antalgic.  Working part time at pharmacy.  Stands a lot. No known injury.   Still has itching and discomfort in perineal area.  Was never called by derm to get an appt with Mercedes Oliveira (she retired in interim).      Objective   Vital Signs:  /80   Pulse 76   Temp 96.4 °F (35.8 °C)   Resp 17   Ht 157.5 cm (62\")   Wt 85.3 kg (188 lb)   SpO2 97%   BMI 34.39 kg/m²   Estimated body mass index is 34.39 kg/m² as calculated from the following:    Height as of this encounter: 157.5 cm (62\").    Weight as of this encounter: 85.3 kg (188 lb).          Physical Exam  Vitals and nursing note reviewed.   Constitutional:       Appearance: Normal appearance. She is well-developed.   HENT:      Head: Normocephalic and atraumatic.      Right Ear: External ear normal.      Left Ear: External ear normal.   Eyes:      Extraocular Movements: Extraocular movements intact.      Conjunctiva/sclera: Conjunctivae normal.   Neck:      Vascular: No carotid bruit.   Cardiovascular:      Rate and Rhythm: Normal rate and regular rhythm.      Heart sounds: Normal heart sounds.      Comments: No bruits  Pulmonary:      Effort: Pulmonary effort is normal. No respiratory distress.      Breath sounds: Normal breath sounds. No wheezing or rales.   Abdominal:      General: Bowel sounds are normal. There is no distension.      Palpations: Abdomen is soft. There is no mass.      " Tenderness: There is no abdominal tenderness.   Musculoskeletal:      Cervical back: Neck supple.   Lymphadenopathy:      Cervical: No cervical adenopathy.   Skin:     General: Skin is warm.   Neurological:      General: No focal deficit present.      Mental Status: She is alert and oriented to person, place, and time.      Gait: Gait abnormal (antalgic gait with limp).   Psychiatric:         Mood and Affect: Mood normal.         Behavior: Behavior normal.         Thought Content: Thought content normal.         Judgment: Judgment normal.        Result Review :                Assessment and Plan   Diagnoses and all orders for this visit:    1. Vagina itching (Primary)  -     Ambulatory Referral to Dermatology    2. Lichen sclerosus of vulva  -     Ambulatory Referral to Dermatology    3. Encounter for screening mammogram for breast cancer  -     Mammo Screening Bilateral With CAD; Future    4. Hypothyroidism, unspecified type  -     TSH  -     T4, Free    5. Type 2 diabetes mellitus with hyperglycemia, without long-term current use of insulin (HCC)    6. Gastroesophageal reflux disease without esophagitis    7. Hyperlipidemia, unspecified hyperlipidemia type    8. Anxiety    Other orders  -     levothyroxine (Euthyrox) 50 MCG tablet; Take 1 tablet by mouth Daily.  Dispense: 90 tablet; Refill: 0    she is due to check her thyroid labs after dosage increase a few months ago  Refill at the 50 mcg dosage  Referral to different provider for LS of the vulva.  Will go with dermatologist  since Mercedes Oliveira has retired.    Screening MMG ordered  Left hip pain under care of [facility]--we did discuss the injection and its possible benefits  Type 2 DM--a1c above goal in July.  She is working on dietary changes.  Also continue januvia 50 mg qd and metformin 500 mg bid.   HL on lipitor  GERD on nexium             Follow Up {Instructions Charge Capture  Follow-up Communications :23}  Return in about 6 months (around 3/2/2023).  Patient  was given instructions and counseling regarding her condition or for health maintenance advice. Please see specific information pulled into the AVS if appropriate.

## 2022-09-02 NOTE — TELEPHONE ENCOUNTER
Left message for patient explaining there is no anesthesia for a fluro guided hip injection,should she choose to move forward we can, asked her to please call the office for scheduling

## 2022-09-03 LAB
T4 FREE SERPL-MCNC: 0.63 NG/DL (ref 0.82–1.77)
TSH SERPL DL<=0.005 MIU/L-ACNC: 0.65 UIU/ML (ref 0.45–4.5)

## 2022-09-06 ENCOUNTER — TRANSCRIBE ORDERS (OUTPATIENT)
Dept: ADMINISTRATIVE | Facility: HOSPITAL | Age: 72
End: 2022-09-06

## 2022-09-06 DIAGNOSIS — Z12.31 VISIT FOR SCREENING MAMMOGRAM: Primary | ICD-10-CM

## 2022-09-08 PROBLEM — E03.9 HYPOTHYROIDISM: Status: ACTIVE | Noted: 2022-09-08

## 2022-09-08 PROBLEM — E11.65 TYPE 2 DIABETES MELLITUS WITH HYPERGLYCEMIA, WITHOUT LONG-TERM CURRENT USE OF INSULIN: Status: ACTIVE | Noted: 2022-09-08

## 2022-09-08 PROBLEM — N90.4 LICHEN SCLEROSUS OF VULVA: Status: ACTIVE | Noted: 2022-09-08

## 2022-09-08 PROBLEM — E78.5 HYPERLIPIDEMIA: Status: ACTIVE | Noted: 2022-09-08

## 2022-09-08 PROBLEM — K21.9 GASTROESOPHAGEAL REFLUX DISEASE WITHOUT ESOPHAGITIS: Status: ACTIVE | Noted: 2022-09-08

## 2022-09-09 ENCOUNTER — HOSPITAL ENCOUNTER (OUTPATIENT)
Dept: MAMMOGRAPHY | Facility: HOSPITAL | Age: 72
Discharge: HOME | End: 2022-09-09
Admitting: INTERNAL MEDICINE

## 2022-09-09 DIAGNOSIS — Z12.31 VISIT FOR SCREENING MAMMOGRAM: ICD-10-CM

## 2022-09-09 PROCEDURE — 77063 BREAST TOMOSYNTHESIS BI: CPT

## 2022-09-09 PROCEDURE — 77067 SCR MAMMO BI INCL CAD: CPT

## 2022-09-09 RX ORDER — DICYCLOMINE HYDROCHLORIDE 10 MG/1
CAPSULE ORAL
Qty: 90 CAPSULE | Refills: 0 | Status: SHIPPED | OUTPATIENT
Start: 2022-09-09 | End: 2022-11-09 | Stop reason: SDUPTHER

## 2022-09-09 RX ORDER — ATORVASTATIN CALCIUM 20 MG/1
TABLET, FILM COATED ORAL
Qty: 90 TABLET | Refills: 0 | Status: SHIPPED | OUTPATIENT
Start: 2022-09-09 | End: 2022-12-16

## 2022-09-09 NOTE — TELEPHONE ENCOUNTER
Rx Refill Note  Requested Prescriptions     Pending Prescriptions Disp Refills   • atorvastatin (LIPITOR) 20 MG tablet [Pharmacy Med Name: Atorvastatin Calcium 20 MG Oral Tablet] 90 tablet 0     Sig: Take 1 tablet by mouth once daily      Last office visit with prescribing clinician: 9/2/2022      Next office visit with prescribing clinician: 1/3/2023       {TIP  Please add Last Relevant Lab Date if appropriate: 7/7/22    Beatrice Sampson MA  09/09/22, 15:59 EDT

## 2022-09-14 RX ORDER — AMLODIPINE BESYLATE 5 MG/1
TABLET ORAL
Qty: 90 TABLET | Refills: 1 | Status: SHIPPED | OUTPATIENT
Start: 2022-09-14

## 2022-09-14 NOTE — TELEPHONE ENCOUNTER
Rx Refill Note  Requested Prescriptions     Pending Prescriptions Disp Refills   • amLODIPine (NORVASC) 5 MG tablet [Pharmacy Med Name: amLODIPine Besylate 5 MG Oral Tablet] 90 tablet 0     Sig: Take 1 tablet by mouth once daily      Last office visit with prescribing clinician: 9/2/2022      Next office visit with prescribing clinician: 1/3/2023            Nikole Causey MA  09/14/22, 09:19 EDT

## 2022-09-15 ENCOUNTER — TELEPHONE (OUTPATIENT)
Dept: FAMILY MEDICINE CLINIC | Facility: CLINIC | Age: 72
End: 2022-09-15

## 2022-09-15 NOTE — TELEPHONE ENCOUNTER
Caller: Pearl Christiansen    Relationship: Self    Best call back number: [phone]     What is the best time to reach you: ANY TIME    Who are you requesting to speak with (clinical staff, provider,  specific staff member): CLINICAL STAFF    What was the call regarding: PATIENT CALLED IN REGARDS TO HER ONGOING LEFT HIP PAIN SHE IS EXPERIENCING. DR. VALENCIA DID RECOMMEND A CORTISONE INJECTION AFTER MRI FINDINGS BUT PATIENT STATES SHE CANNOT DO THOSE DUE TO THE LIDOCAINE. IT DOES NOT NUMB HER AND DR. VALENCIA DID STATE GENERAL ANESTHESIA IS NOT AN OPTION FOR THIS. SHE WANTS TO KNOW WHAT OTHER OPTIONS SHE HAS AS FAR AS TREATING THE PAIN.    PLEASE ADVISE     [facility] [address] [place] [address] - [address] [facility] - [phone] [facility] [phone]   [phone]    Do you require a callback: YES

## 2022-09-20 ENCOUNTER — HOSPITAL ENCOUNTER (OUTPATIENT)
Dept: NUCLEAR MEDICINE | Facility: HOSPITAL | Age: 72
Discharge: HOME | End: 2022-09-20

## 2022-09-20 DIAGNOSIS — R14.0 BLOATING: ICD-10-CM

## 2022-09-20 DIAGNOSIS — R11.0 NAUSEA: ICD-10-CM

## 2022-09-20 PROCEDURE — 78264 GASTRIC EMPTYING IMG STUDY: CPT

## 2022-09-20 PROCEDURE — 0 TECHNETIUM SULFUR COLLOID: Performed by: NURSE PRACTITIONER

## 2022-09-20 PROCEDURE — A9541 TC99M SULFUR COLLOID: HCPCS | Performed by: NURSE PRACTITIONER

## 2022-09-20 RX ADMIN — TECHNETIUM TC 99M SULFUR COLLOID 1 DOSE: KIT at 07:46

## 2022-09-27 ENCOUNTER — OFFICE VISIT (OUTPATIENT)
Dept: ORTHOPEDIC SURGERY | Facility: CLINIC | Age: 72
End: 2022-09-27

## 2022-09-27 VITALS — WEIGHT: 179.6 LBS | HEIGHT: 62 IN | TEMPERATURE: 97.4 F | BODY MASS INDEX: 33.05 KG/M2

## 2022-09-27 DIAGNOSIS — M25.552 LEFT HIP PAIN: Primary | ICD-10-CM

## 2022-09-27 PROCEDURE — 99214 OFFICE O/P EST MOD 30 MIN: CPT | Performed by: ORTHOPAEDIC SURGERY

## 2022-09-27 NOTE — PROGRESS NOTES
Patient: Pearl Christiansen  YOB: 1950 72 y.o. female  Medical Record Number: [id number]    Chief Complaints:   Chief Complaint   Patient presents with   • Left Hip - Initial Evaluation       History of Present Illness:Pearl Christiansen is a 72 y.o. female who presents with left hip pain it has been present for 2 months and has slightly progressed since last visit.  She has had recent MRI and is here for follow-up.  She is on her feet for work and feels that the pain is progressed to at times severe in nature.    Allergies:   Allergies   Allergen Reactions   • Lidocaine Other (See Comments)     Does not work on pt   • Keflex [Cephalexin] Rash   • Penicillins Rash       Medications:   Current Outpatient Medications   Medication Sig Dispense Refill   • Acetaminophen (TYLENOL PO) Take 650 mg by mouth 2 (Two) Times a Day.     • amLODIPine (NORVASC) 5 MG tablet Take 1 tablet by mouth once daily 90 tablet 1   • aspirin 81 MG EC tablet Take 81 mg by mouth Daily.     • atorvastatin (LIPITOR) 20 MG tablet Take 1 tablet by mouth once daily 90 tablet 0   • dicyclomine (BENTYL) 10 MG capsule TAKE 1 CAPSULE BY MOUTH THREE TIMES DAILY AS NEEDED (ABDOMINAL  PAIN/DIARRHEA) 90 capsule 0   • escitalopram (LEXAPRO) 10 MG tablet Take 1 tablet by mouth once daily 90 tablet 0   • esomeprazole (nexIUM) 40 MG capsule Take 1 capsule by mouth Every Morning Before Breakfast. 90 capsule 1   • fluticasone (FLONASE) 50 MCG/ACT nasal spray 2 sprays into the nostril(s) as directed by provider Daily.     • gabapentin (NEURONTIN) 300 MG capsule TAKE 1 CAPSULE BY MOUTH THREE TIMES DAILY 270 capsule 0   • Januvia 50 MG tablet Take 1 tablet by mouth once daily 90 tablet 1   • levothyroxine (Euthyrox) 50 MCG tablet Take 1 tablet by mouth Daily. 90 tablet 0   • meloxicam (MOBIC) 15 MG tablet Take 1 tablet by mouth Daily. With food and water for left hip pain 30 tablet 1   • metFORMIN ER (GLUCOPHAGE-XR) 500 MG 24 hr tablet TAKE 1 TABLET BY MOUTH  "TWICE DAILY BEFORE MEAL(S) 180 tablet 0   • multivitamin (THERAGRAN) tablet tablet Take  by mouth Daily.     • nystatin (MYCOSTATIN) 285276 UNIT/GM ointment Apply 1 application topically to the appropriate area as directed 2 (Two) Times a Day. 30 g 0   • triamcinolone (KENALOG) 0.1 % ointment Apply 1 application topically to the appropriate area as directed 2 (Two) Times a Day. 30 g 0   • methocarbamol (ROBAXIN) 500 MG tablet Take 500 mg by mouth 4 (Four) Times a Day.       No current facility-administered medications for this visit.         The following portions of the patient's history were reviewed and updated as appropriate: allergies, current medications, past family history, past medical history, past social history, past surgical history and problem list.    Review of Systems:   A 14 point review of systems was performed. All systems negative except pertinent positives/negative listed in HPI above    Physical Exam:   Vitals:    09/27/22 1621   Temp: 97.4 °F (36.3 °C)   TempSrc: Temporal   Weight: 81.5 kg (179 lb 9.6 oz)   Height: 157.5 cm (62.01\")       General: A and O x 3, ASA, NAD    SCLERA:    Normal    DENTITION:   Normal  Hip:  left    LEG ALIGNMENT:     Neutral        LEG LENGTH DISCREPANCY   :    none    GAIT:     Antalgic    SKIN:     No abnormality    RANGE OF MOTION:     Limited by joint irritability    STRENGTH:     Limited by joint irratibility    DISTAL PULSES:    Paplable    DISTAL SENSATION :   Intact    LYMPHATICS:     No   lymphadenopathy    OTHER:          +   Stinchfeld test      -    log roll      -   Tenderness to palpation trochanteric bursa          Radiology:  Xrays 2views left hip (AP bilateral hips, and lateral of the hip) taken at the hospital were reviewed  demonstrating  minimal arthritic findings    Recent MRI done at [facility] of the left hip shows evidence of bone marrow edema within the nonweightbearing and weightbearing left femoral head with a small hip effusion no " evidence to support avascular necrosis.  Does not appear to be consistent with tumor or fracture.    Assessment/Plan: Left hip pain which is progressive in nature.  The MRI shows edema through the head but no fracture line and no evidence of AVN.  There is also an effusion so clearly the hip is irritated.  Given the progressive nature and the fact that she is not ready to proceed with hip replacement I am going to set her up for a left hip fluoroscopy guided injection.  I have sent her to Dr. Rhona Hammer to consider sedation because she does not respond to local anesthetic.  I will have her return and see Nancy in 6 weeks.  We will repeat x-rays AP pelvis and lateral left hip at that time.      Josh Harris MD  9/27/2022

## 2022-10-12 RX ORDER — METFORMIN HYDROCHLORIDE 500 MG/1
TABLET, EXTENDED RELEASE ORAL
Qty: 180 TABLET | Refills: 1 | Status: SHIPPED | OUTPATIENT
Start: 2022-10-12 | End: 2022-10-24

## 2022-10-12 NOTE — TELEPHONE ENCOUNTER
Patient instructed in gym program  Patient demonstrated and verbalized independence and understanding  Patient educated to contact PT if he has any future questions  Rx Refill Note  Requested Prescriptions     Pending Prescriptions Disp Refills   • metFORMIN ER (GLUCOPHAGE-XR) 500 MG 24 hr tablet [Pharmacy Med Name: metFORMIN HCl  MG Oral Tablet Extended Release 24 Hour] 180 tablet 0     Sig: TAKE 1 TABLET BY MOUTH TWICE DAILY BEFORE MEAL(S)      Last office visit with prescribing clinician: 9/2/2022      Next office visit with prescribing clinician: 1/3/2023       {TIP  Please add Last Relevant Lab Date if appropriate: 7/7/22    Beatrice Sampson MA  10/12/22, 14:10 EDT

## 2022-10-19 RX ORDER — NYSTATIN 100000 U/G
1 OINTMENT TOPICAL 2 TIMES DAILY
Qty: 30 G | Refills: 0 | Status: SHIPPED | OUTPATIENT
Start: 2022-10-19

## 2022-10-19 NOTE — TELEPHONE ENCOUNTER
Rx Refill Note  Requested Prescriptions     Pending Prescriptions Disp Refills   • nystatin (MYCOSTATIN) 174434 UNIT/GM ointment 30 g 0     Sig: Apply 1 application topically to the appropriate area as directed 2 (Two) Times a Day.      Last office visit with prescribing clinician: 9/2/2022      Next office visit with prescribing clinician: 1/3/2023       {TIP  Please add Last Relevant Lab Date if appropriate: 09/02/22    Shea Lees MA  10/19/22, 11:14 EDT

## 2022-10-20 ENCOUNTER — OFFICE VISIT (OUTPATIENT)
Dept: CARDIOLOGY | Facility: CLINIC | Age: 72
End: 2022-10-20

## 2022-10-20 VITALS
DIASTOLIC BLOOD PRESSURE: 68 MMHG | SYSTOLIC BLOOD PRESSURE: 132 MMHG | WEIGHT: 175.8 LBS | BODY MASS INDEX: 32.35 KG/M2 | HEIGHT: 62 IN | HEART RATE: 67 BPM

## 2022-10-20 DIAGNOSIS — I25.10 CORONARY ARTERY DISEASE INVOLVING NATIVE CORONARY ARTERY OF NATIVE HEART WITHOUT ANGINA PECTORIS: Primary | ICD-10-CM

## 2022-10-20 DIAGNOSIS — I10 PRIMARY HYPERTENSION: ICD-10-CM

## 2022-10-20 DIAGNOSIS — R00.2 PALPITATIONS: ICD-10-CM

## 2022-10-20 PROCEDURE — 93000 ELECTROCARDIOGRAM COMPLETE: CPT | Performed by: NURSE PRACTITIONER

## 2022-10-20 PROCEDURE — 99214 OFFICE O/P EST MOD 30 MIN: CPT | Performed by: NURSE PRACTITIONER

## 2022-10-20 NOTE — PROGRESS NOTES
"    CARDIOLOGY        Patient Name: Pearl Christiansen  :1950  Age: 72 y.o.  Primary Cardiologist: Jeremy Hutsno MD  Encounter Provider:  VELVET Daly    Date of Service: 10/20/22            CHIEF COMPLAINT / REASON FOR OFFICE VISIT     Follow-up, Hypertension, and Palpitations      HISTORY OF PRESENT ILLNESS       HPI  Pearl Christiansen is a 72 y.o. female who presents today for semiannual follow-up.     Pt has a  history significant for diabetes, hypertension, family history of CAD, ALEXANDER on CPAP.    Patient states that she has done well since last assessment.  She does check BP intermittently and reports that it has been controlled.  She is tolerating all medications without adverse effects.  She states that she has had some sharp, intermittent mid-sternal chest pain.  Episodes last hours.  Denies exacerbating or aggravating symptoms.  She does have history GERD.  She admits that she has had some stomach issues and recently had an abnormal gastric emptying study.  She states that her breathing is controlled.  She does have heart palpitations that are no worse than before.  Reports some generalized fatigue.  She has been deconditioned secondary to her stomach issues and ortho issues.      The following portions of the patient's history were reviewed and updated as appropriate: allergies, current medications, past family history, past medical history, past social history, past surgical history and problem list.      VITAL SIGNS     Visit Vitals  /68 (BP Location: Left arm, Patient Position: Sitting, Cuff Size: Adult)   Pulse 67   Ht 157.5 cm (62.01\")   Wt 79.7 kg (175 lb 12.8 oz)   LMP  (LMP Unknown)   BMI 32.15 kg/m²         Wt Readings from Last 3 Encounters:   10/20/22 79.7 kg (175 lb 12.8 oz)   22 81.5 kg (179 lb 9.6 oz)   22 85.3 kg (188 lb)     Body mass index is 32.15 kg/m².      REVIEW OF SYSTEMS   Review of Systems   Constitutional: Positive for malaise/fatigue. Negative for " chills, fever, weight gain and weight loss.   Cardiovascular: Positive for chest pain. Negative for leg swelling.   Respiratory: Negative for cough, snoring and wheezing.    Hematologic/Lymphatic: Negative for bleeding problem. Does not bruise/bleed easily.   Skin: Negative for color change.   Musculoskeletal: Negative for falls, joint pain and myalgias.   Gastrointestinal: Positive for bloating and heartburn. Negative for melena.   Genitourinary: Negative for hematuria.   Neurological: Negative for excessive daytime sleepiness.   Psychiatric/Behavioral: Negative for depression. The patient is not nervous/anxious.            PHYSICAL EXAMINATION     Vitals and nursing note reviewed.   Constitutional:       Appearance: Normal appearance. Well-developed.   Eyes:      Conjunctiva/sclera: Conjunctivae normal.   Neck:      Vascular: No carotid bruit.   Pulmonary:      Breath sounds: Normal breath sounds.   Cardiovascular:      Normal rate. Regular rhythm. Normal S1 with normal intensity. Normal S2 with normal intensity.      Murmurs: There is no murmur.      No gallop. No click. No rub.   Musculoskeletal: Normal range of motion. Skin:     General: Skin is warm and dry.   Neurological:      Mental Status: Alert and oriented to person, place, and time.      GCS: GCS eye subscore is 4. GCS verbal subscore is 5. GCS motor subscore is 6.   Psychiatric:         Speech: Speech normal.         Behavior: Behavior normal.         Thought Content: Thought content normal.         Judgment: Judgment normal.           REVIEWED DATA       ECG 12 Lead    Date/Time: 10/20/2022 11:13 AM  Performed by: Nesha Luong APRN  Authorized by: Nesha Luong APRN   Comparison: not compared with previous ECG   Previous ECG: no previous ECG available  Rhythm: sinus rhythm  Rate: normal  BPM: 67  Conduction: conduction normal  ST Segments: ST segments normal  T Waves: T waves normal  QRS axis: normal    Clinical impression: normal  ECG            Cardiac Procedures:  1. Left cardiac catheterization in [place] 8/14/2017.  Left main was normal.  LAD 50% mid disease.  Circumflex had luminal irregularities.  RCA was dominant with luminal irregularities.  2. [facility] chest [facility] 10/5/2021.  No PE or aortic pathology noted.  3. Myocardial perfusion stress test 10/6/2021.  Small apical defect consistent with artifact.  No ischemia.  4. Echocardiogram 11/24/2021.  LVEF 61-65%.  Mild hypertrophy.  Grade 1 diastolic dysfunction.  Normal RV cavity size and systolic function.  No evidence of pericardial effusion.  5. 24-hour monitor 11/30/2021.  Normal monitor study.  Patient had total of 7 PACs.  6. ZIO monitor 1/30/2022.  Underlying rhythm was sinus with average heart rate 85.  SVT ectopy less than 1%    Lipid Panel    Lipid Panel 7/7/22   Total Cholesterol 160   Triglycerides 406 (A)   HDL Cholesterol 41   VLDL Cholesterol 62 (A)   LDL Cholesterol  57   LDL/HDL Ratio 0.92   (A) Abnormal value            BUN   Date Value Ref Range Status   07/07/2022 16 8 - 23 mg/dL Final     Creatinine   Date Value Ref Range Status   07/07/2022 1.00 0.57 - 1.00 mg/dL Final   12/10/2021 0.90 0.60 - 1.30 mg/dL Final     Comment:     Serial Number: [id number]:  [id number]     Potassium   Date Value Ref Range Status   07/07/2022 4.3 3.5 - 5.2 mmol/L Final     ALT (SGPT)   Date Value Ref Range Status   07/07/2022 15 1 - 33 U/L Final     AST (SGOT)   Date Value Ref Range Status   07/07/2022 21 1 - 32 U/L Final           ASSESSMENT & PLAN     Diagnoses and all orders for this visit:    1. Coronary artery disease involving native coronary artery of native heart without angina pectoris (Primary)  · Nonobstructing on cardiac catheterization in 2017  · Patient has chest burning sensation to the midsternal region, abnormal gastric emptying study.  I suspect this is from her GI tract and not true anginal pain  · Continue aspirin, atorvastatin, amlodipine    2. Primary  hypertension  · Blood pressure controlled at 132/68  · Continue amlodipine 5 mg/day    3. Palpitations  · Palpitations controlled    Other orders  -     ECG 12 Lead          Return in 6 months (on 4/20/2023) for Dr. Ramon-transitioning from Dr. Hutson ([facility]).    Future Appointments       Provider Department Center    11/3/2022 10:40 AM Rhona Hammer MD [facility] PAIN MANAGEMENT [place]    11/15/2022 10:20 AM [facility] Gates [facility] [place]    1/3/2023 10:30 AM Jesi Estes MD [facility] PRIMARY CARE [place]    3/14/2023 10:45 AM Jesi Estes MD [facility] PRIMARY CARE [place]    4/21/2023 10:00 AM Harvinder Ramon MD [facility] CARDIOLOGY [place]                MEDICATIONS         Discharge Medications          Accurate as of October 20, 2022  3:33 PM. If you have any questions, ask your nurse or doctor.            Continue These Medications      Instructions Start Date   amLODIPine 5 MG tablet  Commonly known as: NORVASC   Take 1 tablet by mouth once daily      aspirin 81 MG EC tablet   81 mg, Oral, Daily      atorvastatin 20 MG tablet  Commonly known as: LIPITOR   Take 1 tablet by mouth once daily      dicyclomine 10 MG capsule  Commonly known as: BENTYL   TAKE 1 CAPSULE BY MOUTH THREE TIMES DAILY AS NEEDED (ABDOMINAL  PAIN/DIARRHEA)      escitalopram 10 MG tablet  Commonly known as: LEXAPRO   Take 1 tablet by mouth once daily      esomeprazole 40 MG capsule  Commonly known as: nexIUM   40 mg, Oral, Every Morning Before Breakfast      fluticasone 50 MCG/ACT nasal spray  Commonly known as: FLONASE   2 sprays, Nasal, Daily      gabapentin 300 MG capsule  Commonly known as: NEURONTIN   TAKE 1 CAPSULE BY MOUTH THREE TIMES DAILY      Januvia 50 MG tablet  Generic drug: SITagliptin   Take 1 tablet by mouth once daily      levothyroxine 50 MCG tablet  Commonly known as: Euthyrox   50 mcg,  Oral, Daily      meloxicam 15 MG tablet  Commonly known as: MOBIC   15 mg, Oral, Daily, With food and water for left hip pain      metFORMIN  MG 24 hr tablet  Commonly known as: GLUCOPHAGE-XR   TAKE 1 TABLET BY MOUTH TWICE DAILY BEFORE MEAL(S)      multivitamin tablet tablet   Oral, Daily      nystatin 000327 UNIT/GM ointment  Commonly known as: MYCOSTATIN   1 application, Topical, 2 Times Daily      triamcinolone 0.1 % ointment  Commonly known as: KENALOG   1 application, Topical, 2 Times Daily      TYLENOL PO   650 mg, Oral, 2 Times Daily         Stop These Medications    methocarbamol 500 MG tablet  Commonly known as: ROBAXIN  Stopped by: VELVET Daly                **Dragon Disclaimer:   Much of this encounter note is an electronic transcription/translation of spoken language to printed text. The electronic translation of spoken language may permit erroneous, or at times, nonsensical words or phrases to be inadvertently transcribed. Although I have reviewed the note for such errors, some may still exist.

## 2022-10-24 RX ORDER — METFORMIN HYDROCHLORIDE 500 MG/1
TABLET, EXTENDED RELEASE ORAL
Qty: 180 TABLET | Refills: 1 | Status: SHIPPED | OUTPATIENT
Start: 2022-10-24

## 2022-10-24 RX ORDER — ESCITALOPRAM OXALATE 10 MG/1
TABLET ORAL
Qty: 90 TABLET | Refills: 1 | Status: SHIPPED | OUTPATIENT
Start: 2022-10-24

## 2022-11-02 NOTE — H&P (VIEW-ONLY)
The patient has a pain history of the following:  Left hip pain    Previous interventions that the patient has received include:   Wrist injection  Elbow injection  Bilateral hip injections     Pain medications include:  Tylenol   Gabapentin  Meloxicam    Other conservative modalities which the patient reports using include:  Physical Therapy: no  Chiropractor: no  Massage Therapy: no  TENS: no  Neck or back surgery: no  Past pain management: no    Past Significant Surgical History:  None     HPI:       CHIEF COMPLAINT: Hip Pain    Pearl Christiansen is a 72 y.o. female referred here by Josh Harris MD. Pearl Christiansen presents to the office for evaluation and treatment of Hip Pain      History of Present Illness  Onset:  ~1 month ago   Inciting Event:  Nothing in particular  Location:  Left hip   Pain: Pain described as stabbing and throbbing. Located in the left groin area and does radiate into the left thigh.  Severity:  Pain rated as a 3 /10.  Symptoms have been constant.  Exacerbation:  Pivoting on her foot, weight bearing.   Alleviation:  Resting.  Ambulates: Without assistive device   Limitations: This pain limits the patient from being able to stand and walk and bend like she wants.   Goals: Functional goals include ability to do the above.     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         PEG Assessment   What number best describes your pain on average in the past week?7  What number best describes how, during the past week, pain has interfered with your enjoyment of life?7  What number best describes how, during the past week, pain has interfered with your general activity?  8        Current Outpatient Medications:   •  Acetaminophen (TYLENOL PO), Take 650 mg by mouth 2 (Two) Times a Day., Disp: , Rfl:   •  amLODIPine (NORVASC) 5 MG tablet, Take 1 tablet by mouth once daily, Disp: 90 tablet, Rfl: 1  •  aspirin 81 MG EC tablet, Take 81 mg by mouth Daily., Disp: , Rfl:   •  atorvastatin (LIPITOR)  20 MG tablet, Take 1 tablet by mouth once daily, Disp: 90 tablet, Rfl: 0  •  dicyclomine (BENTYL) 10 MG capsule, TAKE 1 CAPSULE BY MOUTH THREE TIMES DAILY AS NEEDED (ABDOMINAL  PAIN/DIARRHEA), Disp: 90 capsule, Rfl: 0  •  escitalopram (LEXAPRO) 10 MG tablet, Take 1 tablet by mouth once daily, Disp: 90 tablet, Rfl: 1  •  esomeprazole (nexIUM) 40 MG capsule, Take 1 capsule by mouth Every Morning Before Breakfast., Disp: 90 capsule, Rfl: 1  •  fluticasone (FLONASE) 50 MCG/ACT nasal spray, 2 sprays into the nostril(s) as directed by provider Daily., Disp: , Rfl:   •  gabapentin (NEURONTIN) 300 MG capsule, TAKE 1 CAPSULE BY MOUTH THREE TIMES DAILY, Disp: 270 capsule, Rfl: 0  •  Januvia 50 MG tablet, Take 1 tablet by mouth once daily, Disp: 90 tablet, Rfl: 1  •  levothyroxine (Euthyrox) 50 MCG tablet, Take 1 tablet by mouth Daily., Disp: 90 tablet, Rfl: 0  •  meloxicam (MOBIC) 15 MG tablet, Take 1 tablet by mouth Daily. With food and water for left hip pain, Disp: 30 tablet, Rfl: 1  •  metFORMIN ER (GLUCOPHAGE-XR) 500 MG 24 hr tablet, TAKE 1 TABLET BY MOUTH TWICE DAILY BEFORE MEAL(S), Disp: 180 tablet, Rfl: 1  •  nystatin (MYCOSTATIN) 767245 UNIT/GM ointment, Apply 1 application topically to the appropriate area as directed 2 (Two) Times a Day., Disp: 30 g, Rfl: 0  •  triamcinolone (KENALOG) 0.1 % ointment, Apply 1 application topically to the appropriate area as directed 2 (Two) Times a Day., Disp: 30 g, Rfl: 0    The following portions of the patient's history were reviewed and updated as appropriate: allergies, current medications, past family history, past medical history, past social history, past surgical history and problem list.      REVIEW OF PERTINENT MEDICAL DATA    8/5/22 XR HIP W OR WO PELVIS 2-3 VIEW LEFT-     Clinical: Left hip pain x4 days     FINDINGS: There is mild narrowing of the left hip joint. No fracture of  the left hip or hemipelvis. There is a 6 x 3 mm calcification projecting  along the lower  "margin of the hip joint. Likely degenerative. No  cortical bone donor site to suggest an avulsion fracture.     CONCLUSION: Left hip joint degeneration as described above. No acute  osseous articular abnormality.     This report was finalized on 8/5/2022 11:01 AM by Dr. Tyson Naranjo M.D.    7/7/22 Creatinine 1.00,  Platelets 237 (10*3)    Review of Systems   Constitutional: Negative for fatigue.   HENT: Negative for congestion.    Eyes: Positive for visual disturbance.   Respiratory: Negative for shortness of breath.    Cardiovascular: Negative for chest pain.   Gastrointestinal: Negative for constipation and diarrhea.   Genitourinary: Negative for difficulty urinating.   Musculoskeletal: Positive for arthralgias (left hip).   Neurological: Positive for weakness (left leg) and numbness.   Psychiatric/Behavioral: Positive for sleep disturbance. Negative for suicidal ideas. The patient is not nervous/anxious.      I have reviewed and confirmed the accuracy of the ROS as documented by the MA/LPN/RN Rhona Hammer MD      Vitals:    11/03/22 1022   BP: 147/82   Pulse: 82   Resp: 18   Temp: 97.8 °F (36.6 °C)   SpO2: 96%   Weight: 79.6 kg (175 lb 6.4 oz)   Height: 157.5 cm (62.01\")   PainSc:   3   PainLoc: Hip         Objective   Physical Exam  Vitals reviewed.   Constitutional:       General: She is not in acute distress.  Pulmonary:      Effort: Pulmonary effort is normal. No respiratory distress.   Musculoskeletal:      Comments: Left hip:  Negative tenderness with palpation of the greater trochanteric bursa.  Pain with active range of motion.  Pain with internal and external rotation.     Skin:     General: Skin is warm and dry.   Neurological:      General: No focal deficit present.      Mental Status: She is alert.   Psychiatric:         Mood and Affect: Mood normal.         Thought Content: Thought content normal.         Assessment & Plan   Diagnoses and all orders for this visit:    1. Primary osteoarthritis " of left hip (Primary)  -     Case Request    2. Chronic left hip pain  -     Case Request          - Pertinent labs reviewed.   - Pertinent imaging reviewed.   - Will schedule for left intra-articular hip injection.  Risks discussed including but not limited to bleeding, bruising, infection, damage to surrounding structures, headache, and rare things such as being paralyzed, seizure, stroke, heart attack and death.  The risk of steroid medications include but are not limited to immunosuppression, which can increase the risk of darren an infectious disease as well as decrease the immune response to a vaccine.  - She would like to hold on physical therapy at this time.     --- Follow-up for left intra-articular hip injection and office visit 3-4 weeks following.            While examining this patient, I wore protective equipment including a mask and gloves.  I washed my hands before and after this patient encounter.  The patient wore a mask throughout the visit as well.     Rhona Hammer MD  Pain Management

## 2022-11-02 NOTE — PROGRESS NOTES
The patient has a pain history of the following:  Left hip pain    Previous interventions that the patient has received include:   Wrist injection  Elbow injection  Bilateral hip injections     Pain medications include:  Tylenol   Gabapentin  Meloxicam    Other conservative modalities which the patient reports using include:  Physical Therapy: no  Chiropractor: no  Massage Therapy: no  TENS: no  Neck or back surgery: no  Past pain management: no    Past Significant Surgical History:  None     HPI:       CHIEF COMPLAINT: Hip Pain    Pearl Christiansen is a 72 y.o. female referred here by Josh Harris MD. Pearl Christiansen presents to the office for evaluation and treatment of Hip Pain      History of Present Illness  Onset:  ~1 month ago   Inciting Event:  Nothing in particular  Location:  Left hip   Pain: Pain described as stabbing and throbbing. Located in the left groin area and does radiate into the left thigh.  Severity:  Pain rated as a 3 /10.  Symptoms have been constant.  Exacerbation:  Pivoting on her foot, weight bearing.   Alleviation:  Resting.  Ambulates: Without assistive device   Limitations: This pain limits the patient from being able to stand and walk and bend like she wants.   Goals: Functional goals include ability to do the above.     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         PEG Assessment   What number best describes your pain on average in the past week?7  What number best describes how, during the past week, pain has interfered with your enjoyment of life?7  What number best describes how, during the past week, pain has interfered with your general activity?  8        Current Outpatient Medications:   •  Acetaminophen (TYLENOL PO), Take 650 mg by mouth 2 (Two) Times a Day., Disp: , Rfl:   •  amLODIPine (NORVASC) 5 MG tablet, Take 1 tablet by mouth once daily, Disp: 90 tablet, Rfl: 1  •  aspirin 81 MG EC tablet, Take 81 mg by mouth Daily., Disp: , Rfl:   •  atorvastatin (LIPITOR)  20 MG tablet, Take 1 tablet by mouth once daily, Disp: 90 tablet, Rfl: 0  •  dicyclomine (BENTYL) 10 MG capsule, TAKE 1 CAPSULE BY MOUTH THREE TIMES DAILY AS NEEDED (ABDOMINAL  PAIN/DIARRHEA), Disp: 90 capsule, Rfl: 0  •  escitalopram (LEXAPRO) 10 MG tablet, Take 1 tablet by mouth once daily, Disp: 90 tablet, Rfl: 1  •  esomeprazole (nexIUM) 40 MG capsule, Take 1 capsule by mouth Every Morning Before Breakfast., Disp: 90 capsule, Rfl: 1  •  fluticasone (FLONASE) 50 MCG/ACT nasal spray, 2 sprays into the nostril(s) as directed by provider Daily., Disp: , Rfl:   •  gabapentin (NEURONTIN) 300 MG capsule, TAKE 1 CAPSULE BY MOUTH THREE TIMES DAILY, Disp: 270 capsule, Rfl: 0  •  Januvia 50 MG tablet, Take 1 tablet by mouth once daily, Disp: 90 tablet, Rfl: 1  •  levothyroxine (Euthyrox) 50 MCG tablet, Take 1 tablet by mouth Daily., Disp: 90 tablet, Rfl: 0  •  meloxicam (MOBIC) 15 MG tablet, Take 1 tablet by mouth Daily. With food and water for left hip pain, Disp: 30 tablet, Rfl: 1  •  metFORMIN ER (GLUCOPHAGE-XR) 500 MG 24 hr tablet, TAKE 1 TABLET BY MOUTH TWICE DAILY BEFORE MEAL(S), Disp: 180 tablet, Rfl: 1  •  nystatin (MYCOSTATIN) 286953 UNIT/GM ointment, Apply 1 application topically to the appropriate area as directed 2 (Two) Times a Day., Disp: 30 g, Rfl: 0  •  triamcinolone (KENALOG) 0.1 % ointment, Apply 1 application topically to the appropriate area as directed 2 (Two) Times a Day., Disp: 30 g, Rfl: 0    The following portions of the patient's history were reviewed and updated as appropriate: allergies, current medications, past family history, past medical history, past social history, past surgical history and problem list.      REVIEW OF PERTINENT MEDICAL DATA    8/5/22 XR HIP W OR WO PELVIS 2-3 VIEW LEFT-     Clinical: Left hip pain x4 days     FINDINGS: There is mild narrowing of the left hip joint. No fracture of  the left hip or hemipelvis. There is a 6 x 3 mm calcification projecting  along the lower  "margin of the hip joint. Likely degenerative. No  cortical bone donor site to suggest an avulsion fracture.     CONCLUSION: Left hip joint degeneration as described above. No acute  osseous articular abnormality.     This report was finalized on 8/5/2022 11:01 AM by Dr. Tyson Naranjo M.D.    7/7/22 Creatinine 1.00,  Platelets 237 (10*3)    Review of Systems   Constitutional: Negative for fatigue.   HENT: Negative for congestion.    Eyes: Positive for visual disturbance.   Respiratory: Negative for shortness of breath.    Cardiovascular: Negative for chest pain.   Gastrointestinal: Negative for constipation and diarrhea.   Genitourinary: Negative for difficulty urinating.   Musculoskeletal: Positive for arthralgias (left hip).   Neurological: Positive for weakness (left leg) and numbness.   Psychiatric/Behavioral: Positive for sleep disturbance. Negative for suicidal ideas. The patient is not nervous/anxious.      I have reviewed and confirmed the accuracy of the ROS as documented by the MA/LPN/RN Rhona Hammer MD      Vitals:    11/03/22 1022   BP: 147/82   Pulse: 82   Resp: 18   Temp: 97.8 °F (36.6 °C)   SpO2: 96%   Weight: 79.6 kg (175 lb 6.4 oz)   Height: 157.5 cm (62.01\")   PainSc:   3   PainLoc: Hip         Objective   Physical Exam  Vitals reviewed.   Constitutional:       General: She is not in acute distress.  Pulmonary:      Effort: Pulmonary effort is normal. No respiratory distress.   Musculoskeletal:      Comments: Left hip:  Negative tenderness with palpation of the greater trochanteric bursa.  Pain with active range of motion.  Pain with internal and external rotation.     Skin:     General: Skin is warm and dry.   Neurological:      General: No focal deficit present.      Mental Status: She is alert.   Psychiatric:         Mood and Affect: Mood normal.         Thought Content: Thought content normal.         Assessment & Plan   Diagnoses and all orders for this visit:    1. Primary osteoarthritis " of left hip (Primary)  -     Case Request    2. Chronic left hip pain  -     Case Request          - Pertinent labs reviewed.   - Pertinent imaging reviewed.   - Will schedule for left intra-articular hip injection.  Risks discussed including but not limited to bleeding, bruising, infection, damage to surrounding structures, headache, and rare things such as being paralyzed, seizure, stroke, heart attack and death.  The risk of steroid medications include but are not limited to immunosuppression, which can increase the risk of darren an infectious disease as well as decrease the immune response to a vaccine.  - She would like to hold on physical therapy at this time.     --- Follow-up for left intra-articular hip injection and office visit 3-4 weeks following.            While examining this patient, I wore protective equipment including a mask and gloves.  I washed my hands before and after this patient encounter.  The patient wore a mask throughout the visit as well.     Rhona Hammer MD  Pain Management

## 2022-11-03 ENCOUNTER — PREP FOR SURGERY (OUTPATIENT)
Dept: SURGERY | Facility: SURGERY CENTER | Age: 72
End: 2022-11-03

## 2022-11-03 ENCOUNTER — OFFICE VISIT (OUTPATIENT)
Dept: PAIN MEDICINE | Facility: CLINIC | Age: 72
End: 2022-11-03

## 2022-11-03 VITALS
HEART RATE: 82 BPM | HEIGHT: 62 IN | RESPIRATION RATE: 18 BRPM | TEMPERATURE: 97.8 F | BODY MASS INDEX: 32.28 KG/M2 | WEIGHT: 175.4 LBS | DIASTOLIC BLOOD PRESSURE: 82 MMHG | SYSTOLIC BLOOD PRESSURE: 147 MMHG | OXYGEN SATURATION: 96 %

## 2022-11-03 DIAGNOSIS — G89.29 CHRONIC LEFT HIP PAIN: ICD-10-CM

## 2022-11-03 DIAGNOSIS — M16.12 PRIMARY OSTEOARTHRITIS OF LEFT HIP: Primary | ICD-10-CM

## 2022-11-03 DIAGNOSIS — M25.552 CHRONIC LEFT HIP PAIN: ICD-10-CM

## 2022-11-03 PROCEDURE — 99204 OFFICE O/P NEW MOD 45 MIN: CPT | Performed by: ANESTHESIOLOGY

## 2022-11-03 RX ORDER — SODIUM CHLORIDE 0.9 % (FLUSH) 0.9 %
10 SYRINGE (ML) INJECTION EVERY 12 HOURS SCHEDULED
Status: CANCELLED | OUTPATIENT
Start: 2022-11-03

## 2022-11-03 RX ORDER — SODIUM CHLORIDE 0.9 % (FLUSH) 0.9 %
10 SYRINGE (ML) INJECTION AS NEEDED
Status: CANCELLED | OUTPATIENT
Start: 2022-11-03

## 2022-11-03 NOTE — PATIENT INSTRUCTIONS
What to expect if we're setting up an injection/procedure    - I have placed the order today, we'll start speaking to your insurance for authorization (this can sometimes take a few weeks).   - You should be scheduled for your procedure before you leave the office.  If you were not, please call our office to schedule.   - If you have any symptoms of an infection or of COVID, please reschedule your procedure.   - LIGHT Intravenous (IV) sedation is offered for some procedures: you will NOT be put to sleep.  If you plan to have sedation, do not eat or drink anything on the day of your injection.   - Most procedures require having someone drive you.  Please make sure you arrange a  unless told otherwise.   - If you take a blood thinner and you were not instructed whether to continue or hold it, please contact us with any questions.

## 2022-11-04 ENCOUNTER — TRANSCRIBE ORDERS (OUTPATIENT)
Dept: SURGERY | Facility: SURGERY CENTER | Age: 72
End: 2022-11-04

## 2022-11-04 DIAGNOSIS — Z41.9 SURGERY, ELECTIVE: Primary | ICD-10-CM

## 2022-11-04 PROBLEM — G89.29 CHRONIC LEFT HIP PAIN: Status: ACTIVE | Noted: 2022-11-04

## 2022-11-04 PROBLEM — M25.552 CHRONIC LEFT HIP PAIN: Status: ACTIVE | Noted: 2022-11-04

## 2022-11-04 PROBLEM — M16.12 PRIMARY OSTEOARTHRITIS OF LEFT HIP: Status: ACTIVE | Noted: 2022-11-04

## 2022-11-09 RX ORDER — DICYCLOMINE HYDROCHLORIDE 10 MG/1
10 CAPSULE ORAL 3 TIMES DAILY PRN
Qty: 90 CAPSULE | Refills: 0 | Status: SHIPPED | OUTPATIENT
Start: 2022-11-09 | End: 2023-03-14 | Stop reason: SDUPTHER

## 2022-11-10 RX ORDER — SITAGLIPTIN 50 MG/1
TABLET, FILM COATED ORAL
Qty: 90 TABLET | Refills: 0 | Status: SHIPPED | OUTPATIENT
Start: 2022-11-10 | End: 2023-02-13

## 2022-11-10 RX ORDER — LEVOTHYROXINE SODIUM 50 UG/1
TABLET ORAL
Qty: 90 TABLET | Refills: 0 | Status: SHIPPED | OUTPATIENT
Start: 2022-11-10 | End: 2023-02-01

## 2022-11-17 NOTE — DISCHARGE INSTRUCTIONS
[facility] - Post-procedure Instructions          --  While there are no absolute restrictions, it is recommended that you do not perform strenuous activity today. In the morning, you may resume your level of activity as before your block.    --  If you have a band-aid at your injection site, please remove it later today. Observe the area for any redness, swelling, pus-like drainage, or a temperature over 101°. If any of these symptoms occur, please call your doctor at [phone]. If after office hours, leave a message and the on-call provider will return your call.    --  Ice may be applied to your injection site. It is recommended you avoid direct heat (heating pad; hot tub) for 1-2 days.    --  Call [facility] at [phone] if you experience persistent headache, persistent bleeding from the injection site, or severe pain not relieved by heat or oral medication.    --  Do not make important decisions today.    --  Due to the effects of the block and/or the I.V. Sedation, DO NOT drive or operate hazardous machinery for 12 hours.  Local anesthetics may cause numbness after procedure and precautions must be taken with regards to operating equipment as well as with walking, even if ambulating with assistance of another person or with an assistive device.    --  Do not drink alcohol for 12 hours.    -- You may return to work tomorrow, or as directed by your referring doctor.    --  Occasionally you may notice a slight increase in your pain after the procedure. This should start to improve within the next 24-48 hours. Radiofrequency ablation procedure pain may last 3-4 weeks.    --  It may take as long as 3-4 days before you notice a gradual improvement in your pain and/or other symptoms.    -- You may continue to take your prescribed pain medication as needed.    --  Some normal possible side effects of steroid use could include fluid retention, increased blood sugar, dull headache,  increased sweating, increased appetite, mood swings and flushing.    --  Diabetics are recommended to watch their blood glucose level closely for 24-48 hours after the injection.    --  Must stay in PACU for 20 min upon arrival and prove no leg weakness before being discharged.    --  IN THE EVENT OF A LIFE THREATENING EMERGENCY, (CHEST PAIN, BREATHING DIFFICULTIES, PARALYSIS…) YOU SHOULD GO TO YOUR NEAREST EMERGENCY ROOM.    --  You should be contacted by our office within 2-3 days to schedule follow up or next appointment date.  If not contacted within 7 days, please call the office at [phone]

## 2022-11-21 ENCOUNTER — HOSPITAL ENCOUNTER (OUTPATIENT)
Facility: SURGERY CENTER | Age: 72
Setting detail: HOSPITAL OUTPATIENT SURGERY
Discharge: HOME | End: 2022-11-21
Attending: ANESTHESIOLOGY | Admitting: ANESTHESIOLOGY

## 2022-11-21 ENCOUNTER — HOSPITAL ENCOUNTER (OUTPATIENT)
Dept: GENERAL RADIOLOGY | Facility: SURGERY CENTER | Age: 72
Setting detail: HOSPITAL OUTPATIENT SURGERY
End: 2022-11-21

## 2022-11-21 VITALS
HEIGHT: 62 IN | DIASTOLIC BLOOD PRESSURE: 86 MMHG | OXYGEN SATURATION: 95 % | BODY MASS INDEX: 34.04 KG/M2 | HEART RATE: 79 BPM | TEMPERATURE: 97.8 F | RESPIRATION RATE: 16 BRPM | SYSTOLIC BLOOD PRESSURE: 145 MMHG | WEIGHT: 185 LBS

## 2022-11-21 DIAGNOSIS — M16.12 PRIMARY OSTEOARTHRITIS OF LEFT HIP: ICD-10-CM

## 2022-11-21 DIAGNOSIS — Z41.9 SURGERY, ELECTIVE: ICD-10-CM

## 2022-11-21 DIAGNOSIS — G89.29 CHRONIC LEFT HIP PAIN: ICD-10-CM

## 2022-11-21 DIAGNOSIS — M25.552 CHRONIC LEFT HIP PAIN: ICD-10-CM

## 2022-11-21 PROCEDURE — 20610 DRAIN/INJ JOINT/BURSA W/O US: CPT | Performed by: ANESTHESIOLOGY

## 2022-11-21 PROCEDURE — 25010000002 IOPAMIDOL 61 % SOLUTION: Performed by: ANESTHESIOLOGY

## 2022-11-21 PROCEDURE — 77002 NEEDLE LOCALIZATION BY XRAY: CPT | Performed by: ANESTHESIOLOGY

## 2022-11-21 PROCEDURE — 77002 NEEDLE LOCALIZATION BY XRAY: CPT

## 2022-11-21 PROCEDURE — 25010000002 FENTANYL CITRATE (PF) 50 MCG/ML SOLUTION: Performed by: ANESTHESIOLOGY

## 2022-11-21 PROCEDURE — 25010000002 MIDAZOLAM PER 1 MG: Performed by: ANESTHESIOLOGY

## 2022-11-21 RX ORDER — SODIUM CHLORIDE 0.9 % (FLUSH) 0.9 %
10 SYRINGE (ML) INJECTION AS NEEDED
Status: DISCONTINUED | OUTPATIENT
Start: 2022-11-21 | End: 2022-11-21 | Stop reason: HOSPADM

## 2022-11-21 RX ORDER — FENTANYL CITRATE 50 UG/ML
INJECTION, SOLUTION INTRAMUSCULAR; INTRAVENOUS AS NEEDED
Status: DISCONTINUED | OUTPATIENT
Start: 2022-11-21 | End: 2022-11-21 | Stop reason: HOSPADM

## 2022-11-21 RX ORDER — MIDAZOLAM HYDROCHLORIDE 1 MG/ML
INJECTION INTRAMUSCULAR; INTRAVENOUS AS NEEDED
Status: DISCONTINUED | OUTPATIENT
Start: 2022-11-21 | End: 2022-11-21 | Stop reason: HOSPADM

## 2022-11-21 RX ORDER — SODIUM CHLORIDE 0.9 % (FLUSH) 0.9 %
10 SYRINGE (ML) INJECTION EVERY 12 HOURS SCHEDULED
Status: DISCONTINUED | OUTPATIENT
Start: 2022-11-21 | End: 2022-11-21 | Stop reason: HOSPADM

## 2022-11-21 NOTE — OP NOTE
Left Hip injection  [facility]    PREOPERATIVE DIAGNOSIS:     Left chronic hip pain,  Left hip osteoarthritis    POSTOPERATIVE DIAGNOSIS:   Same as preop diagnosis    PROCEDURE:  20610-LT - Intra-articular Hip Joint Injection, with fluoroscopic guidance & hip arthrogram    PRE-PROCEDURE DISCUSSION WITH PATIENT:    Risks and complications were discussed with the patient prior to starting the procedure (including but not limited to infection, bleeding, injury, paralysis, and death) and informed consent was obtained.      SURGEON:  Rhona Hammer MD    SEDATION:   Versed 1mg & Fentanyl 50 mcg IV  ANESTHETIC AGENT:   1% Lidocaine  STEROID AGENT:    15mg dexamethasone    DESCRIPTON OF PROCEDURE:  After obtaining informed consent, IV access was obtained in the preoperative area.  The patient was transported to the operative suite and placed in a prone position.  EKG, blood pressure, and pulse oximetry were monitored.  Any and all sedation given was administered by the RN under my direct supervision and guidance.   The hip area was prepped in a wide diameter with Chloraprep and draped in a sterile fashion.     AP  film was used to visualize the neck of the femur on the left side.  A 25-gauge spinal needle was then advanced percutaneously through the anesthetized skin tract under fluoroscopic guidance in a coaxial view to contact periosteum at the target site.  After negative aspiration a volume of  1 mL Isovue was injected producing good spread through the joint space with no evidence of vascular run-off. Subsequently, a volume of 5 mL consisting of the above mixture of steroid and anesthetic agent was injected without resistance.   Vital signs remained stable.  The onset of analgesia was noted.  Needle removed intact.        ESTIMATED BLOOD LOSS:  minimal  SPECIMENS:  None    COMPLICATIONS:  No complications were noted. and There was no indication of vascular uptake on live injection of contrast  dye.    TOLERANCE & DISCHARGE CONDITION:    The patient tolerated the procedure well.  The patient was transported to the recovery area without difficulties.  The patient was discharged to home under the care of a chaperone and in satisfactory condition.    PLAN OF CARE:  1. The patient was given our standard instruction sheet.  2. The patient will follow-up 4-6 weeks.   3. The patient is asked to keep an account of pain for the next several hours today.  4. The patient will resume all medications as per the medication reconciliation sheet.

## 2022-11-28 ENCOUNTER — TELEPHONE (OUTPATIENT)
Dept: FAMILY MEDICINE CLINIC | Facility: CLINIC | Age: 72
End: 2022-11-28

## 2022-11-29 RX ORDER — CLOBETASOL PROPIONATE 0.5 MG/G
1 CREAM TOPICAL
Qty: 45 G | Refills: 0 | Status: SHIPPED | OUTPATIENT
Start: 2022-11-29

## 2022-12-05 ENCOUNTER — TELEPHONE (OUTPATIENT)
Dept: FAMILY MEDICINE CLINIC | Facility: CLINIC | Age: 72
End: 2022-12-05

## 2022-12-05 NOTE — TELEPHONE ENCOUNTER
PATIENT CALLED AND STATES SHE HAS A REFERRAL FOR CRISTIAN OVIEDO, DERMATOLOGIST. SHE NEEDS TO CALL AND MAKE AN APPOINTMENT. IF NOT DR. ESQUIVEL SHE WILL SEE ANYONE. PLEASE CALL WITH PHONE  NUMBER AND ADDRESS     PLEASE CALL AND ADVISE [phone]

## 2022-12-06 NOTE — TELEPHONE ENCOUNTER
PATIENT IS CALLING STATING THAT SHE GOT IN TOUCH WITH [facility] AND THEY STATED PATIENT NEEDS A ACTIVE REFERRAL BEFORE THEY CAN SCHEDULE PATIENT IN.     PATIENT WOULD LIKE TO KNOW IF DR. VALENCIA WOULD BE WILLING TO PLACE A REFERRAL.     PLEASE CALL TO DISCUSS AND ADVISE.

## 2022-12-07 DIAGNOSIS — N90.4 LICHEN SCLEROSUS OF VULVA: Primary | ICD-10-CM

## 2022-12-16 RX ORDER — ATORVASTATIN CALCIUM 20 MG/1
TABLET, FILM COATED ORAL
Qty: 90 TABLET | Refills: 1 | Status: SHIPPED | OUTPATIENT
Start: 2022-12-16

## 2022-12-16 NOTE — TELEPHONE ENCOUNTER
Rx Refill Note  Requested Prescriptions     Pending Prescriptions Disp Refills   • atorvastatin (LIPITOR) 20 MG tablet [Pharmacy Med Name: Atorvastatin Calcium 20 MG Oral Tablet] 90 tablet 0     Sig: Take 1 tablet by mouth once daily      Last office visit with prescribing clinician: 9/2/2022   Last telemedicine visit with prescribing clinician: 1/3/2023   Next office visit with prescribing clinician: 1/3/2023       {TIP  Please add Last Relevant Lab Date if appropriate: 7/7/22                 Would you like a call back once the refill request has been completed: [] Yes [] No    If the office needs to give you a call back, can they leave a voicemail: [] Yes [] No    Beatrice Sampson MA  12/16/22, 09:11 EST

## 2022-12-19 ENCOUNTER — TELEPHONE (OUTPATIENT)
Dept: FAMILY MEDICINE CLINIC | Facility: CLINIC | Age: 72
End: 2022-12-19

## 2022-12-19 ENCOUNTER — PREP FOR SURGERY (OUTPATIENT)
Dept: SURGERY | Facility: SURGERY CENTER | Age: 72
End: 2022-12-19

## 2022-12-19 ENCOUNTER — OFFICE VISIT (OUTPATIENT)
Dept: PAIN MEDICINE | Facility: CLINIC | Age: 72
End: 2022-12-19

## 2022-12-19 ENCOUNTER — LAB (OUTPATIENT)
Dept: LAB | Facility: HOSPITAL | Age: 72
End: 2022-12-19

## 2022-12-19 VITALS
TEMPERATURE: 98.7 F | HEIGHT: 62 IN | HEART RATE: 74 BPM | SYSTOLIC BLOOD PRESSURE: 127 MMHG | DIASTOLIC BLOOD PRESSURE: 78 MMHG | BODY MASS INDEX: 32.24 KG/M2 | OXYGEN SATURATION: 96 % | WEIGHT: 175.2 LBS

## 2022-12-19 DIAGNOSIS — E03.9 HYPOTHYROIDISM, UNSPECIFIED TYPE: Primary | ICD-10-CM

## 2022-12-19 DIAGNOSIS — G89.29 CHRONIC LEFT HIP PAIN: ICD-10-CM

## 2022-12-19 DIAGNOSIS — M16.12 PRIMARY OSTEOARTHRITIS OF LEFT HIP: Primary | ICD-10-CM

## 2022-12-19 DIAGNOSIS — M25.552 CHRONIC LEFT HIP PAIN: ICD-10-CM

## 2022-12-19 PROCEDURE — 83540 ASSAY OF IRON: CPT | Performed by: INTERNAL MEDICINE

## 2022-12-19 PROCEDURE — 99214 OFFICE O/P EST MOD 30 MIN: CPT | Performed by: PHYSICIAN ASSISTANT

## 2022-12-19 RX ORDER — SODIUM CHLORIDE 0.9 % (FLUSH) 0.9 %
10 SYRINGE (ML) INJECTION EVERY 12 HOURS SCHEDULED
Status: CANCELLED | OUTPATIENT
Start: 2022-12-19

## 2022-12-19 RX ORDER — SODIUM CHLORIDE 0.9 % (FLUSH) 0.9 %
10 SYRINGE (ML) INJECTION AS NEEDED
Status: CANCELLED | OUTPATIENT
Start: 2022-12-19

## 2022-12-19 NOTE — TELEPHONE ENCOUNTER
Pt is requesting lab orders be entered so that she can have them drawn prior to her next appt in January.    Please advise

## 2022-12-19 NOTE — PROGRESS NOTES
CHIEF COMPLAINT  F/U HIP PAIN-HIP INJECTION UNDER FLUORO - left- patient states that she had 50% improvement    Subjective   Pearl Christiansen is a 72 y.o. female  who presents to the office for follow-up of procedure.  She completed a left hip IA joint injection   on  11/21/22 performed by Dr. Hammer for management of chronic left hip pain. Patient reports 50% relief from the procedure ongoing on most days.  Patient continues to report pain localized within the left hip joint stating that she is not having pain at the same severity levels that she did prior to the injection.  Patient states that she now only has significant flares of pain with certain pivoting maneuvers as well as if she steps out wrong.    Pain today 3/10 VAS in severity.        Hip Pain   The pain is present in the left hip. The pain is at a severity of 3/10. The pain is mild. The pain has been fluctuating since onset. Associated symptoms include numbness (toes). She reports no foreign bodies present. The symptoms are aggravated by movement.        PEG Assessment   What number best describes your pain on average in the past week?7  What number best describes how, during the past week, pain has interfered with your enjoyment of life?6  What number best describes how, during the past week, pain has interfered with your general activity?  7    Review of Pertinent Medical Data ---    8/5/22 XR HIP W OR WO PELVIS 2-3 VIEW LEFT-     Clinical: Left hip pain x4 days     FINDINGS: There is mild narrowing of the left hip joint. No fracture of  the left hip or hemipelvis. There is a 6 x 3 mm calcification projecting  along the lower margin of the hip joint. Likely degenerative. No  cortical bone donor site to suggest an avulsion fracture.     CONCLUSION: Left hip joint degeneration as described above. No acute  osseous articular abnormality.     This report was finalized on 8/5/2022 11:01 AM by Dr. Tyson Naranjo M.D.    The following portions of the patient's  "history were reviewed and updated as appropriate: allergies, current medications, past family history, past medical history, past social history, past surgical history and problem list.    Review of Systems   Constitutional: Positive for fatigue. Negative for activity change, chills and fever.   HENT: Negative for congestion.    Eyes: Negative for visual disturbance.   Respiratory: Negative for chest tightness and shortness of breath.    Cardiovascular: Negative for chest pain.   Gastrointestinal: Negative for abdominal pain, constipation and diarrhea.   Genitourinary: Negative for difficulty urinating, dyspareunia and dysuria.   Musculoskeletal: Negative for back pain and neck pain.   Neurological: Positive for numbness (toes). Negative for dizziness, weakness, light-headedness and headaches.   Psychiatric/Behavioral: Positive for sleep disturbance. Negative for agitation. The patient is not nervous/anxious.      I have reviewed and confirmed the accuracy of the ROS as documented by the MA/LPN/RN DILCIA Valdivia     Vitals:    12/19/22 1453   BP: 127/78   Pulse: 74   Temp: 98.7 °F (37.1 °C)   SpO2: 96%   Weight: 79.5 kg (175 lb 3.2 oz)   Height: 157.5 cm (62\")   PainSc:   3   PainLoc: Hip         Objective   Physical Exam  Vitals and nursing note reviewed.   Constitutional:       Appearance: Normal appearance. She is normal weight.   HENT:      Head: Normocephalic.   Musculoskeletal:      Left hip: Decreased range of motion. Decreased strength.   Neurological:      Mental Status: She is alert.      Cranial Nerves: Cranial nerves 2-12 are intact.      Sensory: Sensation is intact.      Motor: Motor function is intact.      Gait: Gait abnormal.   Psychiatric:         Mood and Affect: Mood normal.         Behavior: Behavior normal.         Thought Content: Thought content normal.         Judgment: Judgment normal.         Assessment & Plan   Diagnoses and all orders for this visit:    1. Primary osteoarthritis of " left hip (Primary)    2. Chronic left hip pain        --- I will have the patient scheduled to return for her next therapeutic left hip IA joint injection when she is eligible after 2/19/2023  --- RTC 4-6 weeks after injective therapy              Dictated utilizing Dragon dictation.      Patient remained masked during entire encounter. No cough present. I donned a mask and eye protection throughout entire visit. Prior to donning mask and eye protection, hand hygiene was performed, as well as when it was doffed.  I was closer than 6 feet, but not for an extended period of time. No obvious exposure to any bodily fluids.

## 2022-12-21 DIAGNOSIS — D64.9 ANEMIA, UNSPECIFIED TYPE: Primary | ICD-10-CM

## 2023-01-09 NOTE — TELEPHONE ENCOUNTER
Rx Refill Note  Requested Prescriptions     Pending Prescriptions Disp Refills   • gabapentin (NEURONTIN) 300 MG capsule [Pharmacy Med Name: Gabapentin 300 MG Oral Capsule] 270 capsule 0     Sig: TAKE 1 CAPSULE BY MOUTH THREE TIMES DAILY      Last office visit with prescribing clinician: 9/2/2022   Last telemedicine visit with prescribing clinician: 1/18/2023   Next office visit with prescribing clinician: 1/18/2023                         Would you like a call back once the refill request has been completed: [] Yes [] No    If the office needs to give you a call back, can they leave a voicemail: [] Yes [] No    Melita Padilla LPN  01/09/23, 15:11 EST

## 2023-01-10 RX ORDER — GABAPENTIN 300 MG/1
CAPSULE ORAL
Qty: 270 CAPSULE | Refills: 0 | Status: SHIPPED | OUTPATIENT
Start: 2023-01-10

## 2023-01-17 ENCOUNTER — TELEPHONE (OUTPATIENT)
Dept: FAMILY MEDICINE CLINIC | Facility: CLINIC | Age: 73
End: 2023-01-17
Payer: MEDICARE

## 2023-01-17 NOTE — TELEPHONE ENCOUNTER
Spoke to pt and let her know that labs are normal. Pt understood      ----- Message from Jesi Estes MD sent at 1/16/2023  4:42 PM EST -----  normal

## 2023-01-18 ENCOUNTER — TELEPHONE (OUTPATIENT)
Dept: FAMILY MEDICINE CLINIC | Facility: CLINIC | Age: 73
End: 2023-01-18

## 2023-01-18 ENCOUNTER — HOSPITAL ENCOUNTER (OUTPATIENT)
Dept: GENERAL RADIOLOGY | Facility: HOSPITAL | Age: 73
Discharge: HOME | End: 2023-01-18
Admitting: INTERNAL MEDICINE
Payer: MEDICARE

## 2023-01-18 ENCOUNTER — OFFICE VISIT (OUTPATIENT)
Dept: FAMILY MEDICINE CLINIC | Facility: CLINIC | Age: 73
End: 2023-01-18
Payer: MEDICARE

## 2023-01-18 DIAGNOSIS — E03.8 OTHER SPECIFIED HYPOTHYROIDISM: ICD-10-CM

## 2023-01-18 DIAGNOSIS — R05.9 COUGH, UNSPECIFIED TYPE: ICD-10-CM

## 2023-01-18 DIAGNOSIS — U07.1 COVID-19 VIRUS INFECTION: ICD-10-CM

## 2023-01-18 DIAGNOSIS — E78.00 PURE HYPERCHOLESTEROLEMIA: Primary | ICD-10-CM

## 2023-01-18 DIAGNOSIS — E11.65 TYPE 2 DIABETES MELLITUS WITH HYPERGLYCEMIA, WITHOUT LONG-TERM CURRENT USE OF INSULIN: ICD-10-CM

## 2023-01-18 LAB
EXPIRATION DATE: ABNORMAL
FLUAV AG UPPER RESP QL IA.RAPID: NOT DETECTED
FLUBV AG UPPER RESP QL IA.RAPID: NOT DETECTED
INTERNAL CONTROL: ABNORMAL
Lab: ABNORMAL
SARS-COV-2 AG UPPER RESP QL IA.RAPID: DETECTED

## 2023-01-18 PROCEDURE — 99214 OFFICE O/P EST MOD 30 MIN: CPT | Performed by: INTERNAL MEDICINE

## 2023-01-18 PROCEDURE — 87428 SARSCOV & INF VIR A&B AG IA: CPT | Performed by: INTERNAL MEDICINE

## 2023-01-18 PROCEDURE — 71046 X-RAY EXAM CHEST 2 VIEWS: CPT

## 2023-01-18 PROCEDURE — 93000 ELECTROCARDIOGRAM COMPLETE: CPT | Performed by: INTERNAL MEDICINE

## 2023-01-18 RX ORDER — DEXTROMETHORPHAN HYDROBROMIDE AND PROMETHAZINE HYDROCHLORIDE 15; 6.25 MG/5ML; MG/5ML
5 SYRUP ORAL 4 TIMES DAILY PRN
Qty: 118 ML | Refills: 0 | Status: SHIPPED | OUTPATIENT
Start: 2023-01-18 | End: 2023-03-14

## 2023-01-18 RX ORDER — ALBUTEROL SULFATE 90 UG/1
2 AEROSOL, METERED RESPIRATORY (INHALATION) EVERY 4 HOURS PRN
Qty: 18 G | Refills: 1 | Status: SHIPPED | OUTPATIENT
Start: 2023-01-18 | End: 2023-03-14

## 2023-01-18 RX ORDER — BENZONATATE 200 MG/1
CAPSULE ORAL
Qty: 30 CAPSULE | Refills: 0 | Status: SHIPPED | OUTPATIENT
Start: 2023-01-18 | End: 2023-03-14

## 2023-01-18 NOTE — PROGRESS NOTES
"Answers for HPI/ROS submitted by the patient on 1/16/2023  Please describe your symptoms.: Follow up  Have you had these symptoms before?: Yes  How long have you been having these symptoms?: Greater than 2 weeks  What is the primary reason for your visit?: Other    Chief Complaint  Exposure To Known Illness (Pt states tested postive for covid this morning /)    Subjective        Pearl Christiansen presents to [facility]  History of Present Illness  Patient was to follow-up today on diabetes and hypertension high cholesterol, however,  She tested positive today for COVID through a home test.,  I reviewed she has had normal renal function over the past 3 years.  GFR 61 in July 2022.  She does take Lipitor for hyperlipidemia. Has been vaccinated but not boosted.    On Saturday night, she had a very bad episode of gastric refluxw/ vomiting 3 different times.  This persisted through the morning hours on Sunday.  After previous episodes of reflux,  She will  start to have congestion in her chest.  On Monday she started with low-grade fever and chills.  She also had body aches and a cough on Tuesday and laid in bed all day..  She now is coughing up thick yellow mucus.  Today she feels better than yesterday.  She also has episodes of fleeting left upper chest wall pain.  It seems to be worse with coughing.  She does not have any past medical history of lung disease.  She does have diabetes and hypothyroidism.  No history of wheezing or asthma.   Objective   Vital Signs:  There were no vitals taken for this visit.  Estimated body mass index is 32.04 kg/m² as calculated from the following:    Height as of 12/19/22: 157.5 cm (62\").    Weight as of 12/19/22: 79.5 kg (175 lb 3.2 oz).             Physical Exam  Vitals and nursing note reviewed.   Constitutional:       Appearance: Normal appearance.   HENT:      Head: Normocephalic and atraumatic.      Right Ear: Ear canal and external ear normal.      " Left Ear: Ear canal and external ear normal.      Ears:      Comments: Bilateral ear shows decreased light reflex and fluid which is clear behind both tympanic membranes  Eyes:      Conjunctiva/sclera: Conjunctivae normal.   Cardiovascular:      Rate and Rhythm: Normal rate and regular rhythm.      Heart sounds: Normal heart sounds.   Pulmonary:      Effort: Pulmonary effort is normal.      Breath sounds: Rhonchi (scattered throughout left greater than right) present.   Lymphadenopathy:      Cervical: No cervical adenopathy.   Neurological:      Mental Status: She is alert and oriented to person, place, and time.   Psychiatric:         Mood and Affect: Mood normal.         Behavior: Behavior normal.         Thought Content: Thought content normal.         Judgment: Judgment normal.        Result Review :                   Assessment and Plan   Diagnoses and all orders for this visit:    1. Pure hypercholesterolemia (Primary)    2. Other specified hypothyroidism    3. Type 2 diabetes mellitus with hyperglycemia, without long-term current use of insulin (HCC)    4. Cough, unspecified type  -     POCT SARS-CoV-2 Antigen AROLDO + Flu  -     XR chest pa and lateral; Future    5. COVID-19 virus infection  -     XR chest pa and lateral; Future    Other orders  -     Nirmatrelvir&Ritonavir 300/100 (PAXLOVID) 20 x 150 MG & 10 x 100MG tablet therapy pack tablet; Take 3 tablets by mouth 2 (Two) Times a Day for 5 days.  Dispense: 30 tablet; Refill: 0  -     promethazine-dextromethorphan (PROMETHAZINE-DM) 6.25-15 MG/5ML syrup; Take 5 mL by mouth 4 (Four) Times a Day As Needed for Cough.  Dispense: 118 mL; Refill: 0  -     benzonatate (TESSALON) 200 MG capsule; Take one tablet every 8 hours as needed for cough  Dispense: 30 capsule; Refill: 0  -     albuterol sulfate  (90 Base) MCG/ACT inhaler; Inhale 2 puffs Every 4 (Four) Hours As Needed for Wheezing (cough).  Dispense: 18 g; Refill: 1    pt is a good candidate for  Paxlovid.  I advised pt to make sure she stops the lipitor for 2weeks due to drug interaction. Start Paxlovid and mucinex, phenergam DM, albuterol inhaler if needed (can do q 4-6 hours), watch sats keeping greater than or equal to 95%, tessalon perles.  Close f/u needed  CXR ordered today and read as neg for pneumonia therefore will treat with Paxlovid only.  Consider adding antibiotic if not improving (?aspiration) and also could consider steroids if not improving.    EKG normal and unchanged.           Follow Up   No follow-ups on file.  Patient was given instructions and counseling regarding her condition or for health maintenance advice. Please see specific information pulled into the AVS if appropriate.

## 2023-01-19 ENCOUNTER — TELEPHONE (OUTPATIENT)
Dept: FAMILY MEDICINE CLINIC | Facility: CLINIC | Age: 73
End: 2023-01-19
Payer: MEDICARE

## 2023-01-19 NOTE — PROGRESS NOTES
Subjective   Pearl Christiansen is a 72 y.o. female.     History of Present Illness    {Common H&P Review Areas:68359}    Review of Systems    Objective   Physical Exam    Assessment & Plan   Diagnoses and all orders for this visit:    1. Pure hypercholesterolemia (Primary)    2. Other specified hypothyroidism    3. Type 2 diabetes mellitus with hyperglycemia, without long-term current use of insulin (HCC)    4. Cough, unspecified type  -     POCT SARS-CoV-2 Antigen AROLDO + Flu  -     XR chest pa and lateral; Future    5. COVID-19 virus infection  -     XR chest pa and lateral; Future    Other orders  -     Nirmatrelvir&Ritonavir 300/100 (PAXLOVID) 20 x 150 MG & 10 x 100MG tablet therapy pack tablet; Take 3 tablets by mouth 2 (Two) Times a Day for 5 days.  Dispense: 30 tablet; Refill: 0  -     promethazine-dextromethorphan (PROMETHAZINE-DM) 6.25-15 MG/5ML syrup; Take 5 mL by mouth 4 (Four) Times a Day As Needed for Cough.  Dispense: 118 mL; Refill: 0  -     benzonatate (TESSALON) 200 MG capsule; Take one tablet every 8 hours as needed for cough  Dispense: 30 capsule; Refill: 0  -     albuterol sulfate  (90 Base) MCG/ACT inhaler; Inhale 2 puffs Every 4 (Four) Hours As Needed for Wheezing (cough).  Dispense: 18 g; Refill: 1                Answers for HPI/ROS submitted by the patient on 1/16/2023  Please describe your symptoms.: Follow up  Have you had these symptoms before?: Yes  How long have you been having these symptoms?: Greater than 2 weeks  What is the primary reason for your visit?: Other

## 2023-01-19 NOTE — TELEPHONE ENCOUNTER
Can you call pt and check on her?  What are her oxygen sats and how is she breathing/feeling?  CXR was negative.

## 2023-01-19 NOTE — PROGRESS NOTES
Procedure     ECG 12 Lead    Date/Time: 1/18/2023 10:53 PM  Performed by: Jesi Estes MD  Authorized by: Jesi Estes MD   Comparison: compared with previous ECG   Similar to previous ECG  Rhythm: sinus rhythm  Rate: normal  Conduction: conduction normal  ST Segments: ST segments normal  T Waves: T waves normal  QRS axis: normal  Other: no other findings    Clinical impression: normal ECG

## 2023-01-23 ENCOUNTER — TRANSCRIBE ORDERS (OUTPATIENT)
Dept: SURGERY | Facility: SURGERY CENTER | Age: 73
End: 2023-01-23
Payer: MEDICARE

## 2023-01-23 DIAGNOSIS — Z41.9 SURGERY, ELECTIVE: Primary | ICD-10-CM

## 2023-01-23 RX ORDER — ESOMEPRAZOLE MAGNESIUM 40 MG/1
CAPSULE, DELAYED RELEASE ORAL
Qty: 90 CAPSULE | Refills: 0 | Status: SHIPPED | OUTPATIENT
Start: 2023-01-23 | End: 2023-01-27 | Stop reason: SDUPTHER

## 2023-01-23 NOTE — TELEPHONE ENCOUNTER
Rx Refill Note  Requested Prescriptions     Pending Prescriptions Disp Refills   • esomeprazole (nexIUM) 40 MG capsule [Pharmacy Med Name: Esomeprazole Magnesium 40 MG Oral Capsule Delayed Release] 90 capsule 0     Sig: TAKE 1 CAPSULE BY MOUTH ONCE DAILY IN THE MORNING BEFORE BREAKFAST      Last office visit with prescribing clinician: 1/18/2023   Last telemedicine visit with prescribing clinician: 3/14/2023   Next office visit with prescribing clinician: 3/14/2023                         Would you like a call back once the refill request has been completed: [] Yes [] No    If the office needs to give you a call back, can they leave a voicemail: [] Yes [] No    Melita Padilla LPN  01/23/23, 14:55 EST

## 2023-01-27 RX ORDER — ESOMEPRAZOLE MAGNESIUM 40 MG/1
40 CAPSULE, DELAYED RELEASE ORAL
Qty: 90 CAPSULE | Refills: 0 | Status: SHIPPED | OUTPATIENT
Start: 2023-01-27 | End: 2023-03-23 | Stop reason: SDUPTHER

## 2023-02-01 RX ORDER — LEVOTHYROXINE SODIUM 0.05 MG/1
TABLET ORAL
Qty: 90 TABLET | Refills: 0 | Status: SHIPPED | OUTPATIENT
Start: 2023-02-01

## 2023-02-01 NOTE — TELEPHONE ENCOUNTER
Rx Refill Note  Requested Prescriptions     Pending Prescriptions Disp Refills   • levothyroxine (SYNTHROID, LEVOTHROID) 50 MCG tablet [Pharmacy Med Name: Levothyroxine Sodium 50 MCG Oral Tablet] 90 tablet 0     Sig: Take 1 tablet by mouth once daily      Last office visit with prescribing clinician: 1/18/2023   Last telemedicine visit with prescribing clinician: 3/14/2023   Next office visit with prescribing clinician: 3/14/2023       {TIP  Please add Last Relevant Lab Date if appropriate: 9/2/22                 Would you like a call back once the refill request has been completed: [] Yes [] No    If the office needs to give you a call back, can they leave a voicemail: [] Yes [] No    Beatrice Sampson MA  02/01/23, 16:28 EST

## 2023-02-06 RX ORDER — DICYCLOMINE HYDROCHLORIDE 10 MG/1
CAPSULE ORAL
Qty: 90 CAPSULE | Refills: 0 | OUTPATIENT
Start: 2023-02-06

## 2023-02-10 RX ORDER — DICYCLOMINE HYDROCHLORIDE 10 MG/1
CAPSULE ORAL
Qty: 90 CAPSULE | Refills: 0 | OUTPATIENT
Start: 2023-02-10

## 2023-02-13 DIAGNOSIS — E11.65 TYPE 2 DIABETES MELLITUS WITH HYPERGLYCEMIA, WITHOUT LONG-TERM CURRENT USE OF INSULIN: ICD-10-CM

## 2023-02-13 DIAGNOSIS — E03.9 HYPOTHYROIDISM, UNSPECIFIED TYPE: Primary | ICD-10-CM

## 2023-02-13 DIAGNOSIS — E78.00 PURE HYPERCHOLESTEROLEMIA: ICD-10-CM

## 2023-02-13 RX ORDER — SITAGLIPTIN 50 MG/1
TABLET, FILM COATED ORAL
Qty: 90 TABLET | Refills: 0 | Status: SHIPPED | OUTPATIENT
Start: 2023-02-13

## 2023-02-13 NOTE — TELEPHONE ENCOUNTER
Rx Refill Note  Requested Prescriptions     Pending Prescriptions Disp Refills   • Januvia 50 MG tablet [Pharmacy Med Name: Januvia 50 MG Oral Tablet] 90 tablet 0     Sig: Take 1 tablet by mouth once daily      Last office visit with prescribing clinician: 1/18/2023   Last telemedicine visit with prescribing clinician: 3/14/2023   Next office visit with prescribing clinician: 3/14/2023       {TIP  Please add Last Relevant Lab Date if appropriate: 7/7/22                 Would you like a call back once the refill request has been completed: [] Yes [] No    If the office needs to give you a call back, can they leave a voicemail: [] Yes [] No    Beatrice Sampson MA  02/13/23, 09:04 EST

## 2023-02-15 RX ORDER — DICYCLOMINE HYDROCHLORIDE 10 MG/1
10 CAPSULE ORAL 3 TIMES DAILY PRN
Qty: 90 CAPSULE | Refills: 0 | OUTPATIENT
Start: 2023-02-15

## 2023-03-14 ENCOUNTER — OFFICE VISIT (OUTPATIENT)
Dept: FAMILY MEDICINE CLINIC | Facility: CLINIC | Age: 73
End: 2023-03-14
Payer: MEDICARE

## 2023-03-14 VITALS
WEIGHT: 178.5 LBS | HEART RATE: 74 BPM | BODY MASS INDEX: 32.85 KG/M2 | TEMPERATURE: 96.3 F | HEIGHT: 62 IN | RESPIRATION RATE: 16 BRPM | SYSTOLIC BLOOD PRESSURE: 134 MMHG | DIASTOLIC BLOOD PRESSURE: 82 MMHG | OXYGEN SATURATION: 96 %

## 2023-03-14 DIAGNOSIS — I73.9 CLAUDICATION OF LEFT LOWER EXTREMITY: Primary | ICD-10-CM

## 2023-03-14 DIAGNOSIS — K21.9 GASTROESOPHAGEAL REFLUX DISEASE WITHOUT ESOPHAGITIS: ICD-10-CM

## 2023-03-14 DIAGNOSIS — R09.89 DIMINISHED PULSES IN LOWER EXTREMITY: ICD-10-CM

## 2023-03-14 DIAGNOSIS — E11.65 TYPE 2 DIABETES MELLITUS WITH HYPERGLYCEMIA, WITHOUT LONG-TERM CURRENT USE OF INSULIN: ICD-10-CM

## 2023-03-14 DIAGNOSIS — E78.00 PURE HYPERCHOLESTEROLEMIA: ICD-10-CM

## 2023-03-14 DIAGNOSIS — E03.8 OTHER SPECIFIED HYPOTHYROIDISM: ICD-10-CM

## 2023-03-14 PROCEDURE — 1160F RVW MEDS BY RX/DR IN RCRD: CPT | Performed by: INTERNAL MEDICINE

## 2023-03-14 PROCEDURE — 1159F MED LIST DOCD IN RCRD: CPT | Performed by: INTERNAL MEDICINE

## 2023-03-14 PROCEDURE — 99214 OFFICE O/P EST MOD 30 MIN: CPT | Performed by: INTERNAL MEDICINE

## 2023-03-14 RX ORDER — DICYCLOMINE HYDROCHLORIDE 10 MG/1
10 CAPSULE ORAL 3 TIMES DAILY PRN
Qty: 90 CAPSULE | Refills: 0 | Status: SHIPPED | OUTPATIENT
Start: 2023-03-14

## 2023-03-14 RX ORDER — DICYCLOMINE HYDROCHLORIDE 10 MG/1
10 CAPSULE ORAL 3 TIMES DAILY PRN
Qty: 90 CAPSULE | Refills: 0 | Status: SHIPPED | OUTPATIENT
Start: 2023-03-14 | End: 2023-03-14 | Stop reason: SDUPTHER

## 2023-03-14 NOTE — PROGRESS NOTES
"Chief Complaint  Hyperlipidemia    Subjective        Pearl Christiansen presents to [facility]  History of Present Illness  Here for f/u on HL on Lipitor and diabetes on metformin and Januvia 50 mg daily.  She takes gabapentin for pain in her back.  The last time I saw her she had COVID which she has fully recovered from.  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  Today she  C/o bad leg cramps which occur in upper thighs and lower anterior legs --left may be more common than right -- and occur at night and during day and occur with tip toe'ing or while sleeping more commonly.  She is working on drinking more water.  Not drinking soda nor tea anymore.  Drinks 2 cups coffee/day.  HTN on norvasc 5 mg qd  HT on levoxyl 50 mcg qd.  She also  C/o left leg pain with walking that starts in calf on left and feels very tired/tight and feels like she is dragging leg.  Resting relieves it but when restarts walking, it occurs again.  Walking long distances or walking fast will often trigger the calf pain.  When shopping the other day at the mall, she had to rest a few times.  This has been ongoing for months but seems to have gotten worse in the recent past.  Tomorrow seeing dermatologist due to vaginal itching and h/o lichen sclerosis  Seeing pain management for left hip pain and back pain    Objective   Vital Signs:  /82 (BP Location: Right arm, Patient Position: Sitting, Cuff Size: Large Adult)   Pulse 74   Temp 96.3 °F (35.7 °C) (Temporal)   Resp 16   Ht 157.5 cm (62\")   Wt 81 kg (178 lb 8 oz)   SpO2 96%   BMI 32.65 kg/m²   Estimated body mass index is 32.65 kg/m² as calculated from the following:    Height as of this encounter: 157.5 cm (62\").    Weight as of this encounter: 81 kg (178 lb 8 oz).             Physical Exam  Vitals and nursing note reviewed.   Constitutional:       Appearance: Normal appearance. She is well-developed.   HENT:      Head: Normocephalic and " atraumatic.      Right Ear: External ear normal.      Left Ear: External ear normal.   Eyes:      Extraocular Movements: Extraocular movements intact.      Conjunctiva/sclera: Conjunctivae normal.   Neck:      Vascular: No carotid bruit.   Cardiovascular:      Rate and Rhythm: Normal rate and regular rhythm.      Heart sounds: Normal heart sounds.      Comments: No bruits  Pulmonary:      Effort: Pulmonary effort is normal. No respiratory distress.      Breath sounds: Normal breath sounds. No stridor. No wheezing, rhonchi or rales.   Chest:      Chest wall: No tenderness.   Abdominal:      General: Bowel sounds are normal. There is no distension.      Palpations: Abdomen is soft. There is no mass.      Tenderness: There is no abdominal tenderness. There is no guarding or rebound.      Hernia: No hernia is present.   Musculoskeletal:      Cervical back: Neck supple.      Right lower leg: No edema.      Left lower leg: No edema.   Lymphadenopathy:      Cervical: No cervical adenopathy.   Skin:     General: Skin is warm.      Comments: No evidence of neuropathy on monofilament test.  Pulses are very faint and equal on both lower extremities.  Dorsalis pedis trace bilaterally and posterior tib trace bilaterally   Neurological:      Mental Status: She is alert and oriented to person, place, and time. Mental status is at baseline.      Gait: Gait normal.   Psychiatric:         Mood and Affect: Mood normal.         Behavior: Behavior normal.         Thought Content: Thought content normal.         Judgment: Judgment normal.        Result Review :                   Assessment and Plan   Diagnoses and all orders for this visit:    1. Claudication of left lower extremity (HCC) (Primary)  -     Ambulatory Referral to Vascular Surgery    2. Diminished pulses in lower extremity  -     Ambulatory Referral to Vascular Surgery    3. Pure hypercholesterolemia    4. Other specified hypothyroidism    5. Gastroesophageal reflux disease  without esophagitis    6. Type 2 diabetes mellitus with hyperglycemia, without long-term current use of insulin (HCC)    Other orders  -     Discontinue: dicyclomine (BENTYL) 10 MG capsule; Take 1 capsule by mouth 3 (Three) Times a Day As Needed (diarrhea or abdominal pain).  Dispense: 90 capsule; Refill: 0  -     dicyclomine (BENTYL) 10 MG capsule; Take 1 capsule by mouth 3 (Three) Times a Day As Needed (diarrhea or abdominal pain).  Dispense: 90 capsule; Refill: 0    I am concerned that her left lower extremity symptoms are claudication.  I have referred her to Dr. Kobi Mccall for further treatment and evaluation.  Her pulses are definitely diminished.  She may have peripheral vascular disease on both legs.  No evidence of neuropathy today on monofilament testing.  She is due for fasting blood work.  She will have to return when she has not eaten.  Labs have been ordered.  The muscle cramps could be related to peripheral vascular disease but we have also spoken about the benefit of drinking more water, taking magnesium at night, stretching appropriately and avoiding plantarflexion if possible during the night.  She will continue the Levoxyl 50 mcg daily for hypothyroidism I will check her thyroid function.  For diabetes she will continue the metformin extended release as well as the Januvia.  Her Lipitor will be continued for hyperlipidemia her blood pressure is controlled today on Norvasc 5 mg daily.  Thankfully she has seen her dermatologist for the first time since moving to [place] tomorrow.  Hopefully this will help provide relief for her vaginal and vulvar itching related to lichen sclerosus.  Pain management follows her for back pain.         Follow Up   No follow-ups on file.  Patient was given instructions and counseling regarding her condition or for health maintenance advice. Please see specific information pulled into the AVS if appropriate.

## 2023-03-15 NOTE — PROGRESS NOTES
The patient has a pain history of the following:  Chronic Left hip pain  Left hip osteoarthritis      Previous interventions that the patient has received include:   Left Hip injection 11/21/22 -50% relief x2 weeks  Wrist injection  Elbow injection  Bilateral hip injections      Pain medications include:  Tylenol   Gabapentin  Meloxicam     Other conservative modalities which the patient reports using include:  Physical Therapy: no  Chiropractor: no  Massage Therapy: no  TENS: no  Neck or back surgery: no  Past pain management: no     Past Significant Surgical History:  None     HPI:     CHIEF COMPLAINT Hip Pain  F/U hip pain-- patient states that her hip pain remains the same since her last visit.     Subjective   Pearl Christiansen is a 72 y.o. female  who presents for follow-up.  She has a history of chronic left hip pain that improved after injection.    History of Present Illness  Mrs. Christiansen presents today with continued left hip pain.  She states that she has not had a severe exacerbation of her hip pain since she had the injection with me.  She states that she had some relief from the injection, but that it was not significant enough to repeat the injection at this time.  She asks what her other options are for management of her hip pain.    She states that when she sits her pain is not severe.  The majority of her pain occurs whenever she is weightbearing, or when she pivots on her left lower extremity.  A significant amount of the pain is in the left groin.  She has been taking Tylenol and occasionally using meloxicam for her pain.  The meloxicam does upset her stomach.  She states in the past she had good results from muscle relaxants, because the hip pain causes her to be tense in other areas of her body.       PEG Assessment   What number best describes your pain on average in the past week?5  What number best describes how, during the past week, pain has interfered with your enjoyment of life?5  What number  "best describes how, during the past week, pain has interfered with your general activity?  5    REVIEW OF PERTINENT MEDICAL DATA  No new    The following portions of the patient's history were reviewed and updated as appropriate: allergies, current medications, past family history, past medical history, past social history, past surgical history and problem list.    Review of Systems   Constitutional: Positive for fatigue. Negative for activity change, chills and fever.   HENT: Negative for congestion.    Eyes: Negative for visual disturbance.   Respiratory: Negative for chest tightness and shortness of breath.    Cardiovascular: Negative for chest pain.   Gastrointestinal: Positive for abdominal pain and diarrhea. Negative for constipation.   Genitourinary: Negative for difficulty urinating, dyspareunia and dysuria.   Musculoskeletal: Negative for back pain and neck pain.   Neurological: Positive for dizziness, light-headedness and numbness. Negative for weakness and headaches.   Psychiatric/Behavioral: Positive for sleep disturbance. Negative for agitation. The patient is not nervous/anxious.      I have reviewed and confirmed the accuracy of the ROS as documented by the MA/LPN/RN Rhona Hammer MD    Vitals:    03/16/23 1121   BP: 144/79   Pulse: 79   Temp: 98.2 °F (36.8 °C)   SpO2: 98%   Weight: 81.6 kg (179 lb 12.8 oz)   Height: 157.5 cm (62\")   PainSc:   3   PainLoc: Hip  Comment: left         Objective   Physical Exam  Vitals reviewed.   Constitutional:       General: She is not in acute distress.  Pulmonary:      Effort: Pulmonary effort is normal. No respiratory distress.   Musculoskeletal:      Comments: Mild pain with internal and external rotation of the left hip.   Skin:     General: Skin is warm and dry.   Neurological:      Mental Status: She is alert.   Psychiatric:         Mood and Affect: Mood normal.         Thought Content: Thought content normal.             Assessment & Plan   Diagnoses and " all orders for this visit:    1. Primary osteoarthritis of left hip (Primary)    2. Chronic left hip pain    3. Muscle spasm    Other orders  -     celecoxib (CeleBREX) 100 MG capsule; Take 1 capsule by mouth 2 (Two) Times a Day As Needed for Mild Pain.  Dispense: 60 capsule; Refill: 0  -     methocarbamol (ROBAXIN) 500 MG tablet; Take 1 tablet by mouth 3 (Three) Times a Day As Needed for Muscle Spasms.  Dispense: 90 tablet; Refill: 0          -Methocarbamol prescribed today. Discussed medication with the patient.  Included in this discussion was the potential for side effects and adverse events.  Patient verbalized understanding and wished to proceed.  Prescription will be sent to pharmacy.   -Discontinue meloxicam.  -Celebrex prescribed. Discussed medication with the patient.  Included in this discussion was the potential for side effects and adverse events.  Patient verbalized understanding and wished to proceed.  Prescription will be sent to pharmacy.   -Briefly discussed that the option she has left is opioid therapy other than hip replacement.      --- Follow-up 1 month office visit         While examining this patient, I wore protective equipment including a mask and gloves.  I washed my hands before and after this patient encounter.  The patient wore a mask throughout the visit as well.     Rhona Hammer MD  Pain Management    EMR Dragon/Transcription disclaimer:   Much of this encounter note is an electronic transcription/translation of spoken language to printed text. The electronic translation of spoken language may permit erroneous, or at times, nonsensical words or phrases to be inadvertently transcribed; Although I have reviewed the note for such errors, some may still exist.

## 2023-03-16 ENCOUNTER — OFFICE VISIT (OUTPATIENT)
Dept: PAIN MEDICINE | Facility: CLINIC | Age: 73
End: 2023-03-16
Payer: MEDICARE

## 2023-03-16 VITALS
TEMPERATURE: 98.2 F | HEART RATE: 79 BPM | HEIGHT: 62 IN | BODY MASS INDEX: 33.09 KG/M2 | SYSTOLIC BLOOD PRESSURE: 144 MMHG | DIASTOLIC BLOOD PRESSURE: 79 MMHG | WEIGHT: 179.8 LBS | OXYGEN SATURATION: 98 %

## 2023-03-16 DIAGNOSIS — M25.552 CHRONIC LEFT HIP PAIN: ICD-10-CM

## 2023-03-16 DIAGNOSIS — E11.65 TYPE 2 DIABETES MELLITUS WITH HYPERGLYCEMIA, WITHOUT LONG-TERM CURRENT USE OF INSULIN: ICD-10-CM

## 2023-03-16 DIAGNOSIS — G89.29 CHRONIC LEFT HIP PAIN: ICD-10-CM

## 2023-03-16 DIAGNOSIS — E03.9 HYPOTHYROIDISM, UNSPECIFIED TYPE: ICD-10-CM

## 2023-03-16 DIAGNOSIS — M62.838 MUSCLE SPASM: ICD-10-CM

## 2023-03-16 DIAGNOSIS — M16.12 PRIMARY OSTEOARTHRITIS OF LEFT HIP: Primary | ICD-10-CM

## 2023-03-16 DIAGNOSIS — E78.00 PURE HYPERCHOLESTEROLEMIA: ICD-10-CM

## 2023-03-16 PROCEDURE — 99214 OFFICE O/P EST MOD 30 MIN: CPT | Performed by: ANESTHESIOLOGY

## 2023-03-16 PROCEDURE — 1159F MED LIST DOCD IN RCRD: CPT | Performed by: ANESTHESIOLOGY

## 2023-03-16 PROCEDURE — 1160F RVW MEDS BY RX/DR IN RCRD: CPT | Performed by: ANESTHESIOLOGY

## 2023-03-16 PROCEDURE — 1125F AMNT PAIN NOTED PAIN PRSNT: CPT | Performed by: ANESTHESIOLOGY

## 2023-03-16 RX ORDER — CELECOXIB 100 MG/1
100 CAPSULE ORAL 2 TIMES DAILY PRN
Qty: 60 CAPSULE | Refills: 0 | Status: SHIPPED | OUTPATIENT
Start: 2023-03-16

## 2023-03-16 RX ORDER — METHOCARBAMOL 500 MG/1
500 TABLET, FILM COATED ORAL 3 TIMES DAILY PRN
Qty: 90 TABLET | Refills: 0 | Status: SHIPPED | OUTPATIENT
Start: 2023-03-16

## 2023-03-23 ENCOUNTER — OFFICE VISIT (OUTPATIENT)
Dept: GASTROENTEROLOGY | Facility: CLINIC | Age: 73
End: 2023-03-23
Payer: MEDICARE

## 2023-03-23 VITALS
HEIGHT: 62 IN | WEIGHT: 178.3 LBS | DIASTOLIC BLOOD PRESSURE: 72 MMHG | TEMPERATURE: 98 F | SYSTOLIC BLOOD PRESSURE: 150 MMHG | BODY MASS INDEX: 32.81 KG/M2 | OXYGEN SATURATION: 95 % | HEART RATE: 56 BPM

## 2023-03-23 DIAGNOSIS — K58.0 IRRITABLE BOWEL SYNDROME WITH DIARRHEA: ICD-10-CM

## 2023-03-23 DIAGNOSIS — K31.84 GASTROPARESIS: ICD-10-CM

## 2023-03-23 DIAGNOSIS — K21.9 GASTROESOPHAGEAL REFLUX DISEASE WITHOUT ESOPHAGITIS: Primary | ICD-10-CM

## 2023-03-23 PROCEDURE — 1159F MED LIST DOCD IN RCRD: CPT | Performed by: NURSE PRACTITIONER

## 2023-03-23 PROCEDURE — 99214 OFFICE O/P EST MOD 30 MIN: CPT | Performed by: NURSE PRACTITIONER

## 2023-03-23 PROCEDURE — 1160F RVW MEDS BY RX/DR IN RCRD: CPT | Performed by: NURSE PRACTITIONER

## 2023-03-23 RX ORDER — MONTELUKAST SODIUM 4 MG/1
1 TABLET, CHEWABLE ORAL 2 TIMES DAILY
Qty: 60 TABLET | Refills: 5 | Status: SHIPPED | OUTPATIENT
Start: 2023-03-23

## 2023-03-23 RX ORDER — ESOMEPRAZOLE MAGNESIUM 40 MG/1
40 CAPSULE, DELAYED RELEASE ORAL
Qty: 90 CAPSULE | Refills: 3 | Status: SHIPPED | OUTPATIENT
Start: 2023-03-23

## 2023-03-23 NOTE — PROGRESS NOTES
"Chief Complaint   Patient presents with   • GI Problem         History of Present Illness  Patient is a 72-year-old female who presents today for Follow-up.  She was last seen in the office December 2021.  She has a history of GERD, gastroparesis, IBS-D and has had a cholecystectomy.    Patient presents today for follow-up.  She reports overall symptoms remain stable since her last office visit.  She continues on Nexium for GERD.  Feels symptoms are well controlled at this time.  Denies any dysphagia.    She reports occasional issues with dyspepsia and nausea.  These are generally worse if she overeats.    She continues to report issues with bloating and diarrhea.  Generally has around 4 bowel movements per day.  Often has urgency after eating.  She has tried dicyclomine which helps somewhat but does not completely control the symptoms.  Denies any blood in the stool.       Result Review :       Celiac Disease Panel (11/16/2021 13:25)   CBC & Differential (01/13/2023 15:42)   SCANNED - COLONOSCOPY (11/05/2021)   Office Visit with Suha Vogt APRN (12/03/2021)   NM Gastric Emptying (09/20/2022 12:13)      Vital Signs:   /72   Pulse 56   Temp 98 °F (36.7 °C)   Ht 157.5 cm (62\")   Wt 80.9 kg (178 lb 4.8 oz)   SpO2 95%   BMI 32.61 kg/m²     Body mass index is 32.61 kg/m².     Physical Exam  Vitals reviewed.   Constitutional:       General: She is not in acute distress.     Appearance: She is well-developed.   HENT:      Head: Normocephalic and atraumatic.   Pulmonary:      Effort: Pulmonary effort is normal. No respiratory distress.   Abdominal:      General: Abdomen is flat. Bowel sounds are normal. There is no distension.      Palpations: Abdomen is soft.      Tenderness: There is abdominal tenderness in the epigastric area and left upper quadrant.   Skin:     General: Skin is dry.      Coloration: Skin is not pale.   Neurological:      Mental Status: She is alert and oriented to person, place, and " time.   Psychiatric:         Thought Content: Thought content normal.           Assessment and Plan    Diagnoses and all orders for this visit:    1. Gastroesophageal reflux disease without esophagitis (Primary)    2. Irritable bowel syndrome with diarrhea    3. Gastroparesis    Other orders  -     esomeprazole (nexIUM) 40 MG capsule; Take 1 capsule by mouth Every Morning Before Breakfast.  Dispense: 90 capsule; Refill: 3  -     colestipol (COLESTID) 1 g tablet; Take 1 tablet by mouth 2 (Two) Times a Day.  Dispense: 60 tablet; Refill: 5         Discussion  Patient presents today for follow-up of reflux, gastroparesis, and IBS-D.    Reflux symptoms remain well controlled on Nexium and will continue current treatment.  Regarding gastroparesis, patient reports intermittent dyspepsia and bloating.  Reviewed dietary modifications to help with gastroparesis as patient's symptoms are relatively mild at this time recommended continue dietary management.  If symptoms worsen, we can consider prokinetic if needed.    Patient with ongoing issues with diarrhea, inadequately controlled with dicyclomine.  She has had a cholecystectomy.  Recommended a trial of bile acid sequestrant for postcholecystectomy diarrhea and prescription sent to pharmacy for colestipol.  Discussed to decrease frequency of dosing if constipation develops and if 1 tablet twice daily does not help symptoms to notify the office and we can increase dose.          Follow Up   Return in about 1 year (around 3/23/2024), or if symptoms worsen or fail to improve.    Patient Instructions   For GERD, continue Nexium 40 mg daily.  Refill sent to pharmacy.    For gastroparesis, follow a gastroparesis diet. Eat smaller more frequent meals as opposed to large meals and foods lower in fat and fiber.     For diarrhea, begin trial of colestipol.  Prescription sent to pharmacy.

## 2023-03-23 NOTE — PATIENT INSTRUCTIONS
For GERD, continue Nexium 40 mg daily.  Refill sent to pharmacy.    For gastroparesis, follow a gastroparesis diet. Eat smaller more frequent meals as opposed to large meals and foods lower in fat and fiber.     For diarrhea, begin trial of colestipol.  Prescription sent to pharmacy.

## 2023-03-24 ENCOUNTER — LAB (OUTPATIENT)
Dept: LAB | Facility: HOSPITAL | Age: 73
End: 2023-03-24
Payer: MEDICARE

## 2023-03-24 LAB
ALBUMIN SERPL-MCNC: 4.1 G/DL (ref 3.5–5.2)
ALBUMIN/GLOB SERPL: 1.4 G/DL
ALP SERPL-CCNC: 73 U/L (ref 39–117)
ALT SERPL W P-5'-P-CCNC: 14 U/L (ref 1–33)
ANION GAP SERPL CALCULATED.3IONS-SCNC: 10 MMOL/L (ref 5–15)
AST SERPL-CCNC: 20 U/L (ref 1–32)
BASOPHILS # BLD AUTO: 0.09 10*3/MM3 (ref 0–0.2)
BASOPHILS NFR BLD AUTO: 1.6 % (ref 0–1.5)
BILIRUB SERPL-MCNC: 0.2 MG/DL (ref 0–1.2)
BUN SERPL-MCNC: 21 MG/DL (ref 8–23)
BUN/CREAT SERPL: 21.6 (ref 7–25)
CALCIUM SPEC-SCNC: 8.8 MG/DL (ref 8.6–10.5)
CHLORIDE SERPL-SCNC: 106 MMOL/L (ref 98–107)
CHOLEST SERPL-MCNC: 150 MG/DL (ref 0–200)
CO2 SERPL-SCNC: 27 MMOL/L (ref 22–29)
CREAT SERPL-MCNC: 0.97 MG/DL (ref 0.57–1)
DEPRECATED RDW RBC AUTO: 41.7 FL (ref 37–54)
EGFRCR SERPLBLD CKD-EPI 2021: 63 ML/MIN/1.73
EOSINOPHIL # BLD AUTO: 0.22 10*3/MM3 (ref 0–0.4)
EOSINOPHIL NFR BLD AUTO: 3.8 % (ref 0.3–6.2)
ERYTHROCYTE [DISTWIDTH] IN BLOOD BY AUTOMATED COUNT: 14.2 % (ref 12.3–15.4)
GLOBULIN UR ELPH-MCNC: 3 GM/DL
GLUCOSE SERPL-MCNC: 118 MG/DL (ref 65–99)
HBA1C MFR BLD: 7.2 % (ref 4.8–5.6)
HCT VFR BLD AUTO: 34.4 % (ref 34–46.6)
HDLC SERPL-MCNC: 43 MG/DL (ref 40–60)
HGB BLD-MCNC: 11.1 G/DL (ref 12–15.9)
IMM GRANULOCYTES # BLD AUTO: 0.03 10*3/MM3 (ref 0–0.05)
IMM GRANULOCYTES NFR BLD AUTO: 0.5 % (ref 0–0.5)
LDLC SERPL CALC-MCNC: 67 MG/DL (ref 0–100)
LDLC/HDLC SERPL: 1.32 {RATIO}
LYMPHOCYTES # BLD AUTO: 1.84 10*3/MM3 (ref 0.7–3.1)
LYMPHOCYTES NFR BLD AUTO: 31.7 % (ref 19.6–45.3)
MCH RBC QN AUTO: 25.8 PG (ref 26.6–33)
MCHC RBC AUTO-ENTMCNC: 32.3 G/DL (ref 31.5–35.7)
MCV RBC AUTO: 80 FL (ref 79–97)
MONOCYTES # BLD AUTO: 0.58 10*3/MM3 (ref 0.1–0.9)
MONOCYTES NFR BLD AUTO: 10 % (ref 5–12)
NEUTROPHILS NFR BLD AUTO: 3.04 10*3/MM3 (ref 1.7–7)
NEUTROPHILS NFR BLD AUTO: 52.4 % (ref 42.7–76)
NRBC BLD AUTO-RTO: 0 /100 WBC (ref 0–0.2)
PLATELET # BLD AUTO: 219 10*3/MM3 (ref 140–450)
PMV BLD AUTO: 11.3 FL (ref 6–12)
POTASSIUM SERPL-SCNC: 4.5 MMOL/L (ref 3.5–5.2)
PROT SERPL-MCNC: 7.1 G/DL (ref 6–8.5)
RBC # BLD AUTO: 4.3 10*6/MM3 (ref 3.77–5.28)
SODIUM SERPL-SCNC: 143 MMOL/L (ref 136–145)
TRIGL SERPL-MCNC: 251 MG/DL (ref 0–150)
VLDLC SERPL-MCNC: 40 MG/DL (ref 5–40)
WBC NRBC COR # BLD: 5.8 10*3/MM3 (ref 3.4–10.8)

## 2023-03-24 PROCEDURE — 84439 ASSAY OF FREE THYROXINE: CPT | Performed by: INTERNAL MEDICINE

## 2023-03-24 PROCEDURE — 80053 COMPREHEN METABOLIC PANEL: CPT | Performed by: INTERNAL MEDICINE

## 2023-03-24 PROCEDURE — 83036 HEMOGLOBIN GLYCOSYLATED A1C: CPT | Performed by: INTERNAL MEDICINE

## 2023-03-24 PROCEDURE — 84443 ASSAY THYROID STIM HORMONE: CPT | Performed by: INTERNAL MEDICINE

## 2023-03-24 PROCEDURE — 36415 COLL VENOUS BLD VENIPUNCTURE: CPT

## 2023-03-24 PROCEDURE — 80061 LIPID PANEL: CPT | Performed by: INTERNAL MEDICINE

## 2023-03-24 PROCEDURE — 85025 COMPLETE CBC W/AUTO DIFF WBC: CPT | Performed by: INTERNAL MEDICINE

## 2023-03-28 DIAGNOSIS — D64.89 ANEMIA DUE TO OTHER CAUSE, NOT CLASSIFIED: ICD-10-CM

## 2023-03-28 DIAGNOSIS — R71.8 MICROCYTOSIS: Primary | ICD-10-CM

## 2023-04-03 ENCOUNTER — TRANSCRIBE ORDERS (OUTPATIENT)
Dept: ADMINISTRATIVE | Facility: HOSPITAL | Age: 73
End: 2023-04-03
Payer: MEDICARE

## 2023-04-03 ENCOUNTER — LAB (OUTPATIENT)
Dept: LAB | Facility: HOSPITAL | Age: 73
End: 2023-04-03
Payer: MEDICARE

## 2023-04-03 DIAGNOSIS — I73.9 PAD (PERIPHERAL ARTERY DISEASE): Primary | ICD-10-CM

## 2023-04-03 LAB
FERRITIN SERPL-MCNC: 18.4 NG/ML (ref 13–150)
IRON 24H UR-MRATE: 52 MCG/DL (ref 37–145)

## 2023-04-03 PROCEDURE — 83540 ASSAY OF IRON: CPT | Performed by: INTERNAL MEDICINE

## 2023-04-03 PROCEDURE — 82728 ASSAY OF FERRITIN: CPT | Performed by: INTERNAL MEDICINE

## 2023-04-03 PROCEDURE — 36415 COLL VENOUS BLD VENIPUNCTURE: CPT | Performed by: INTERNAL MEDICINE

## 2023-04-07 ENCOUNTER — HOSPITAL ENCOUNTER (OUTPATIENT)
Dept: CARDIOLOGY | Facility: HOSPITAL | Age: 73
Discharge: HOME | End: 2023-04-07
Admitting: NURSE PRACTITIONER
Payer: MEDICARE

## 2023-04-07 DIAGNOSIS — I73.9 PAD (PERIPHERAL ARTERY DISEASE): ICD-10-CM

## 2023-04-07 LAB
BH CV LOWER ARTERIAL LEFT ABI RATIO: 0.69
BH CV LOWER ARTERIAL LEFT DORSALIS PEDIS SYS MAX: 94
BH CV LOWER ARTERIAL LEFT GREAT TOE SYS MAX: 33
BH CV LOWER ARTERIAL LEFT POST EX ABI RATIO: 0.31
BH CV LOWER ARTERIAL LEFT POST TIBIAL SYS MAX: 103
BH CV LOWER ARTERIAL LEFT TBI RATIO: 0.22
BH CV LOWER ARTERIAL RIGHT ABI RATIO: 0.99
BH CV LOWER ARTERIAL RIGHT DORSALIS PEDIS SYS MAX: 138
BH CV LOWER ARTERIAL RIGHT GREAT TOE SYS MAX: 130
BH CV LOWER ARTERIAL RIGHT POST EX ABI RATIO: 1.11
BH CV LOWER ARTERIAL RIGHT POST TIBIAL SYS MAX: 148
BH CV LOWER ARTERIAL RIGHT TBI RATIO: 0.87
MAXIMAL PREDICTED HEART RATE: 150 BPM
STRESS TARGET HR: 128 BPM
UPPER ARTERIAL LEFT ARM BRACHIAL SYS MAX: 145 MMHG
UPPER ARTERIAL RIGHT ARM BRACHIAL SYS MAX: 149 MMHG

## 2023-04-07 PROCEDURE — 93924 LWR XTR VASC STDY BILAT: CPT

## 2023-04-10 ENCOUNTER — TELEPHONE (OUTPATIENT)
Dept: FAMILY MEDICINE CLINIC | Facility: CLINIC | Age: 73
End: 2023-04-10
Payer: MEDICARE

## 2023-04-10 DIAGNOSIS — E78.00 PURE HYPERCHOLESTEROLEMIA: ICD-10-CM

## 2023-04-10 DIAGNOSIS — D64.9 ANEMIA, UNSPECIFIED TYPE: ICD-10-CM

## 2023-04-10 DIAGNOSIS — E03.8 OTHER SPECIFIED HYPOTHYROIDISM: ICD-10-CM

## 2023-04-10 DIAGNOSIS — E11.65 TYPE 2 DIABETES MELLITUS WITH HYPERGLYCEMIA, WITHOUT LONG-TERM CURRENT USE OF INSULIN: Primary | ICD-10-CM

## 2023-04-17 NOTE — PROGRESS NOTES
The patient has a pain history of the following:  Chronic Left hip pain  Left hip osteoarthritis      Previous interventions that the patient has received include:   Left Hip injection 11/21/22 -50% relief x2 weeks  Wrist injection  Elbow injection  Bilateral hip injections      Pain medications include:  Tylenol   Gabapentin  Celebrex  Methocarbamol PRN     Previously: Meloxicam (stomach upset)     Other conservative modalities which the patient reports using include:  Physical Therapy: no  Chiropractor: no  Massage Therapy: no  TENS: no  Neck or back surgery: no  Past pain management: no     Past Significant Surgical History:  None     HPI:     CHIEF COMPLAINT Hip Pain      Subjective   Pearl Christiansen is a 72 y.o. female  who presents for follow-up.  She has a history of chronic hip pain.  At her previous visit she explained that she did not have significant enough relief to repeat the hip injection, she asked what other options are available for management of her hip pain.  The majority of her pain occurs whenever she is weightbearing or when she pivots on the left lower extremity.  The majority of the pain is in the left groin.  She was taking Tylenol and occasionally using meloxicam for the pain.  At her previous visit I prescribed methocarbamol, discontinued meloxicam due to stomach irritation, and I prescribed Celebrex.  I discussed that the only option left other than hip replacement is opioid therapy.    History of Present Illness  She states she does feel like the celebrex and methocarbamol are helping with her pain.  She hasn't noticed any stomach irritation with the celebrex.  She takes the meloxicam only when needed at night, not during the day.     For a while, she's had blurry vision and a little dizziness when she turns her head too quickly.       PEG Assessment   What number best describes your pain on average in the past week?5  What number best describes how, during the past week, pain has  "interfered with your enjoyment of life?2  What number best describes how, during the past week, pain has interfered with your general activity?  5    REVIEW OF PERTINENT MEDICAL DATA        8/5/22 XR HIP W OR WO PELVIS 2-3 VIEW LEFT-     Clinical: Left hip pain x4 days     FINDINGS: There is mild narrowing of the left hip joint. No fracture of  the left hip or hemipelvis. There is a 6 x 3 mm calcification projecting  along the lower margin of the hip joint. Likely degenerative. No  cortical bone donor site to suggest an avulsion fracture.     CONCLUSION: Left hip joint degeneration as described above. No acute  osseous articular abnormality.     This report was finalized on 8/5/2022 11:01 AM by Dr. Tyson Naranjo M.D.    The following portions of the patient's history were reviewed and updated as appropriate: allergies, current medications, past family history, past medical history, past social history, past surgical history and problem list.    Review of Systems   Constitutional: Negative for chills, fatigue and fever.   Respiratory: Negative for cough and shortness of breath.    Gastrointestinal: Negative for abdominal pain, constipation and diarrhea.   Genitourinary: Negative for difficulty urinating and dysuria.   Musculoskeletal: Positive for back pain.   Neurological: Positive for dizziness (occasonial), weakness (left leg) and light-headedness (occasional). Negative for numbness.   Psychiatric/Behavioral: Positive for sleep disturbance (cramps in legs frequently). Negative for suicidal ideas.     I have reviewed and confirmed the accuracy of the ROS as documented by the MA/LPN/RN Rhona Hammer MD    Vitals:    04/18/23 1024   BP: 143/84   BP Location: Left arm   Patient Position: Sitting   Pulse: 81   Temp: 98 °F (36.7 °C)   TempSrc: Temporal   SpO2: 96%   Weight: 81.6 kg (179 lb 12.8 oz)   Height: 157.5 cm (62\")   PainSc:   3   PainLoc: Hip         Objective   Physical Exam  Vitals reviewed. "   Constitutional:       General: She is not in acute distress.  Pulmonary:      Effort: Pulmonary effort is normal. No respiratory distress.   Musculoskeletal:      Comments: Negative pain with internal and external rotation of the left hip.    Skin:     General: Skin is warm and dry.   Neurological:      Mental Status: She is alert.   Psychiatric:         Mood and Affect: Mood normal.         Thought Content: Thought content normal.             Assessment & Plan   Diagnoses and all orders for this visit:    1. Primary osteoarthritis of left hip (Primary)  -     celecoxib (CeleBREX) 100 MG capsule; Take 1 capsule by mouth 2 (Two) Times a Day As Needed for Mild Pain.  Dispense: 60 capsule; Refill: 3    2. Chronic left hip pain  -     celecoxib (CeleBREX) 100 MG capsule; Take 1 capsule by mouth 2 (Two) Times a Day As Needed for Mild Pain.  Dispense: 60 capsule; Refill: 3    3. Muscle spasm          - Refill provided for celebrex.  Advised to try missing a dose and see if she needs it.  If it makes no difference, discontinue taking it on a regular basis.   - Does not need a refill of methocarbamol.   - Explained dizziness and blurry vision could be due to gabapentin.  She states benefits outweigh side effects at this time.  May consider Lyrica in the future if this becomes more problematic.       --- Follow-up 4 months office visit          While examining this patient, I wore protective equipment including a mask and gloves.  I washed my hands before and after this patient encounter.  The patient wore a mask throughout the visit as well.     Rhona Hammer MD  Pain Management    EMR Dragon/Transcription disclaimer:   Much of this encounter note is an electronic transcription/translation of spoken language to printed text. The electronic translation of spoken language may permit erroneous, or at times, nonsensical words or phrases to be inadvertently transcribed; Although I have reviewed the note for such errors, some  may still exist.

## 2023-04-18 ENCOUNTER — OFFICE VISIT (OUTPATIENT)
Dept: PAIN MEDICINE | Facility: CLINIC | Age: 73
End: 2023-04-18
Payer: MEDICARE

## 2023-04-18 VITALS
HEART RATE: 81 BPM | TEMPERATURE: 98 F | WEIGHT: 179.8 LBS | BODY MASS INDEX: 33.09 KG/M2 | OXYGEN SATURATION: 96 % | DIASTOLIC BLOOD PRESSURE: 84 MMHG | HEIGHT: 62 IN | SYSTOLIC BLOOD PRESSURE: 143 MMHG

## 2023-04-18 DIAGNOSIS — M16.12 PRIMARY OSTEOARTHRITIS OF LEFT HIP: Primary | ICD-10-CM

## 2023-04-18 DIAGNOSIS — M25.552 CHRONIC LEFT HIP PAIN: ICD-10-CM

## 2023-04-18 DIAGNOSIS — G89.29 CHRONIC LEFT HIP PAIN: ICD-10-CM

## 2023-04-18 DIAGNOSIS — M62.838 MUSCLE SPASM: ICD-10-CM

## 2023-04-18 RX ORDER — CILOSTAZOL 50 MG/1
1 TABLET ORAL EVERY 12 HOURS SCHEDULED
COMMUNITY
Start: 2023-03-24

## 2023-04-18 RX ORDER — ESCITALOPRAM OXALATE 10 MG/1
TABLET ORAL
Qty: 90 TABLET | Refills: 1 | Status: SHIPPED | OUTPATIENT
Start: 2023-04-18

## 2023-04-18 RX ORDER — CELECOXIB 100 MG/1
CAPSULE ORAL
Qty: 60 CAPSULE | Refills: 0 | OUTPATIENT
Start: 2023-04-18

## 2023-04-18 RX ORDER — CELECOXIB 100 MG/1
100 CAPSULE ORAL 2 TIMES DAILY PRN
Qty: 60 CAPSULE | Refills: 3 | Status: SHIPPED | OUTPATIENT
Start: 2023-04-18

## 2023-04-18 NOTE — TELEPHONE ENCOUNTER
Rx Refill Note  Requested Prescriptions     Pending Prescriptions Disp Refills   • escitalopram (LEXAPRO) 10 MG tablet [Pharmacy Med Name: Escitalopram Oxalate 10 MG Oral Tablet] 90 tablet 0     Sig: Take 1 tablet by mouth once daily      Last office visit with prescribing clinician: 3/14/2023   Last telemedicine visit with prescribing clinician: Visit date not found   Next office visit with prescribing clinician: Visit date not found                         Would you like a call back once the refill request has been completed: [] Yes [] No    If the office needs to give you a call back, can they leave a voicemail: [] Yes [] No    Beatrice Sampson MA  04/18/23, 11:36 EDT

## 2023-04-27 ENCOUNTER — OFFICE VISIT (OUTPATIENT)
Dept: CARDIOLOGY | Facility: CLINIC | Age: 73
End: 2023-04-27
Payer: MEDICARE

## 2023-04-27 VITALS
WEIGHT: 181 LBS | BODY MASS INDEX: 33.31 KG/M2 | HEART RATE: 92 BPM | DIASTOLIC BLOOD PRESSURE: 76 MMHG | SYSTOLIC BLOOD PRESSURE: 146 MMHG | HEIGHT: 62 IN | OXYGEN SATURATION: 97 %

## 2023-04-27 DIAGNOSIS — E11.65 TYPE 2 DIABETES MELLITUS WITH HYPERGLYCEMIA, WITHOUT LONG-TERM CURRENT USE OF INSULIN: ICD-10-CM

## 2023-04-27 DIAGNOSIS — E78.00 PURE HYPERCHOLESTEROLEMIA: Primary | ICD-10-CM

## 2023-04-27 DIAGNOSIS — I25.10 CORONARY ARTERY DISEASE INVOLVING NATIVE CORONARY ARTERY OF NATIVE HEART WITHOUT ANGINA PECTORIS: ICD-10-CM

## 2023-04-27 DIAGNOSIS — R01.1 HEART MURMUR: ICD-10-CM

## 2023-04-27 DIAGNOSIS — E03.8 OTHER SPECIFIED HYPOTHYROIDISM: ICD-10-CM

## 2023-04-27 RX ORDER — ATORVASTATIN CALCIUM 40 MG/1
40 TABLET, FILM COATED ORAL NIGHTLY
Qty: 90 TABLET | Refills: 3 | Status: SHIPPED | OUTPATIENT
Start: 2023-04-27 | End: 2024-04-26

## 2023-04-27 RX ORDER — CARVEDILOL 3.12 MG/1
3.12 TABLET ORAL 2 TIMES DAILY
Qty: 60 TABLET | Refills: 11 | Status: SHIPPED | OUTPATIENT
Start: 2023-04-27

## 2023-04-27 NOTE — PROGRESS NOTES
[facility]    Subjective:     Encounter Date:04/27/23      Patient ID: Pearl Christiansen is a 72 y.o. female.    Chief Complaint:   Chief Complaint   Patient presents with   • Coronary Artery Disease      History of Present Illness    Ms. Garcia is a pleasant 72-year-old lady past medical history hypertension, diabetes, nonobstructive coronary artery disease, sleep apnea on CPAP, peripheral vascular disease with left lower extremity claudication who presents for follow-up    She previously followed with Dr. Hutson in our office.  She has had a episodes of epigastric discomfort which have been on and off over the last several years.  Due to her nonobstructive CAD, she was evaluated for cardiac cause.  She was admitted to [facility] for this and she underwent a Lexiscan nuclear stress test which showed a possible apical infarct but no ischemia.  It was thought to be GI origin.    She has been investigated for palpitations in the past and has had Holter's that show intermittent PACs, but nothing more significant.  She reports her symptoms are stable today.  She reports no new significant palpitations, and no chest pain.  Her intermittent epigastric discomfort is controlled on PPI.  She does have some left lower extremity claudication, but she sees vascular surgery and there is no evidence of threatened limb ischemia.  She walks through and is trying to do graded exercise to help with her pain    Cardiac Procedures:  1. Left cardiac catheterization in [place] 8/14/2017.  Left main was normal.  LAD 50% mid disease.  Circumflex had luminal irregularities.  RCA was dominant with luminal irregularities.  2. CTA chest at [facility] 10/5/2021.  No PE or aortic pathology noted.  3. Myocardial perfusion stress test 10/6/2021.  Small apical defect consistent with artifact.  No ischemia.  4. Echocardiogram 11/24/2021.  LVEF 61-65%.  Mild hypertrophy.  Grade 1 diastolic dysfunction.  Normal  RV cavity size and systolic function.  No evidence of pericardial effusion.  5. 24-hour monitor 11/30/2021.  Normal monitor study.  Patient had total of 7 PACs.  6. ZIO monitor 1/30/2022.  Underlying rhythm was sinus with average heart rate 85.  SVT ectopy less than 1%    The following portions of the patient's history were reviewed and updated as appropriate: allergies, current medications, past family history, past medical history, past social history, past surgical history and problem list.    Past Medical History:   Diagnosis Date   • Allergic    • Anxiety    • Arthritis    • Arthritis of back    • Arthritis of neck    • CAD (coronary artery disease)     50% mid LAD by cath on 8/14/2017   • Depression    • Diabetes mellitus    • Diverticulosis    • Fatty liver 2018   • GERD (gastroesophageal reflux disease)    • Hip arthrosis    • Hyperlipidemia    • Hypertension    • Irritable bowel syndrome    • Knee swelling    • Low back strain    • Obesity    • ALEXANDER (obstructive sleep apnea)    • PAD (peripheral artery disease)    • Stress fracture        Past Surgical History:   Procedure Laterality Date   • ADENOIDECTOMY     • CARDIAC CATHETERIZATION Left 08/14/2017    [facility]   • CHOLECYSTECTOMY     • COLONOSCOPY     • FOOT SURGERY     • GALLBLADDER SURGERY     • HAND SURGERY     • RECTOVAGINAL FISTULA REPAIR     • STEROID INJECTION HIP Left 11/21/2022    Procedure: HIP INJECTION UNDER FLUORO - left;  Surgeon: Rhona Hammer MD;  [place] [facility] [place];  Service: Pain Management;  Laterality: Left;   • TONSILLECTOMY     • TRIGGER FINGER RELEASE     • TRIGGER POINT INJECTION             ECG 12 Lead    Date/Time: 4/27/2023 9:49 AM  Performed by: Harvinder Ramon MD  Authorized by: Harvinder Ramon MD   Comparison: compared with previous ECG from 10/20/2022  Similar to previous ECG  Rhythm: sinus rhythm  Rate: normal  Conduction: conduction normal  ST Segments: ST segments normal  T Waves: T waves normal  QRS axis:  "normal  Other findings: low voltage and poor R wave progression    Clinical impression: abnormal EKG               Objective:     Vitals:    04/27/23 0847   BP: 146/76   Pulse: 92   SpO2: 97%   Weight: 82.1 kg (181 lb)   Height: 157.5 cm (62\")         Constitutional:       Appearance: Healthy appearance. Not in distress.   Neck:      Vascular: JVD normal.   Pulmonary:      Effort: Pulmonary effort is normal.      Breath sounds: Normal breath sounds.   Cardiovascular:      PMI at left midclavicular line. Normal rate. Regular rhythm. Normal S2.      Murmurs: There is a grade 1/6 harsh midsystolic murmur at the URSB, radiating to the neck.      Comments: No carotid bruits detected  Pulses:     Intact distal pulses.   Edema:     Peripheral edema absent.   Skin:     General: Skin is warm and dry.   Neurological:      General: No focal deficit present.      Mental Status: Alert, oriented to person, place, and time and oriented to person, place and time.   Psychiatric:         Mood and Affect: Mood and affect normal.         Lab Review:     Lipid Panel        7/7/2022    11:04 3/24/2023    10:26   Lipid Panel   Total Cholesterol 160   150     Triglycerides 406   251     HDL Cholesterol 41   43     VLDL Cholesterol 62   40     LDL Cholesterol  57   67     LDL/HDL Ratio 0.92   1.32       BUN   Date Value Ref Range Status   03/24/2023 21 8 - 23 mg/dL Final   10/22/2019 19 9.8 - 20.1 mg/dL Final     Creatinine   Date Value Ref Range Status   03/24/2023 0.97 0.57 - 1.00 mg/dL Final   12/10/2021 0.90 0.60 - 1.30 mg/dL Final     Comment:     Serial Number: [id number]:  [id number]   10/22/2019 1.02 0.57 - 1.11 mg/dL Final     Potassium   Date Value Ref Range Status   03/24/2023 4.5 3.5 - 5.2 mmol/L Final   10/22/2019 4.8 3.5 - 5.1 mmol/L Final     ALT (SGPT)   Date Value Ref Range Status   03/24/2023 14 1 - 33 U/L Final   10/22/2019 20 0 - 61 U/L Final     AST (SGOT)   Date Value Ref Range Status   03/24/2023 20 1 - 32 U/L Final "   10/22/2019 21 5 - 34 U/L Final         Performed        Assessment:         No diagnosis found.       Plan:         1. Coronary artery disease, nonobstructive: Possible apical infarct on SPECT at [place], but this was not revealed on echocardiogram.  She gives no history of angina right now.  Cardiac cath 2017 showed 50% mid LAD lesion  2. Cardiac murmur  3. Palpitations, PACs  1. We will repeat echocardiogram given new murmur on exam, and prior questionable apical infarct  2. Her palpitations are stable, however given the nonobstructive CAD, mild elevated resting heart rate, and hypertension today, we will start carvedilol 3.125 twice daily.  Patient states that she already feels fatigued and is worried about beta-blockers, but I described her the symptoms are likely mild, but if she has worsening fatigue we could switch her losartan  3. Aspirin 81 daily  4. Peripheral vascular disease, hyperlipidemia: LDL most recent check was in the 60s, and she is on monitor intensity statin.  1. After further discussion, will intensify her statin to Lipitor 40  2. Her claudication symptoms are stable, she is doing graded exercise  3. Continue follow-up with vascular  4. She does have a complaint of feeling her heartbeat in her neck, obtaining the echo, her exam today is without bruits to signify carotid stenosis.       Thank you for allowing me to participate in the care of Pearl Christiansen. Feel free to contact me directly with any further questions or concerns.    RTC 6 months for symptom recheck.  Intensify lipid therapy, starting carvedilol, and obtaining echo in interim    Harvinder Ramon MD  [facility]  04/27/23  08:59 EDT       Current Outpatient Medications:   •  Acetaminophen (TYLENOL PO), Take 650 mg by mouth 2 (Two) Times a Day., Disp: , Rfl:   •  amLODIPine (NORVASC) 5 MG tablet, Take 1 tablet by mouth once daily, Disp: 90 tablet, Rfl: 1  •  aspirin 81 MG EC tablet, Take 1 tablet by mouth Daily., Disp:  , Rfl:   •  atorvastatin (LIPITOR) 20 MG tablet, Take 1 tablet by mouth once daily, Disp: 90 tablet, Rfl: 1  •  cilostazol (PLETAL) 50 MG tablet, Take 1 tablet by mouth Every 12 (Twelve) Hours., Disp: , Rfl:   •  clobetasol (TEMOVATE) 0.05 % cream, Apply 1 application topically to the appropriate area as directed every night at bedtime., Disp: 45 g, Rfl: 0  •  colestipol (COLESTID) 1 g tablet, Take 1 tablet by mouth 2 (Two) Times a Day., Disp: 60 tablet, Rfl: 5  •  dicyclomine (BENTYL) 10 MG capsule, Take 1 capsule by mouth 3 (Three) Times a Day As Needed (diarrhea or abdominal pain)., Disp: 90 capsule, Rfl: 0  •  escitalopram (LEXAPRO) 10 MG tablet, Take 1 tablet by mouth once daily, Disp: 90 tablet, Rfl: 1  •  esomeprazole (nexIUM) 40 MG capsule, Take 1 capsule by mouth Every Morning Before Breakfast., Disp: 90 capsule, Rfl: 3  •  fluticasone (FLONASE) 50 MCG/ACT nasal spray, 2 sprays into the nostril(s) as directed by provider Daily., Disp: , Rfl:   •  gabapentin (NEURONTIN) 300 MG capsule, TAKE 1 CAPSULE BY MOUTH THREE TIMES DAILY, Disp: 270 capsule, Rfl: 0  •  Januvia 50 MG tablet, Take 1 tablet by mouth once daily, Disp: 90 tablet, Rfl: 0  •  levothyroxine (SYNTHROID, LEVOTHROID) 50 MCG tablet, Take 1 tablet by mouth once daily, Disp: 90 tablet, Rfl: 0  •  metFORMIN ER (GLUCOPHAGE-XR) 500 MG 24 hr tablet, TAKE 1 TABLET BY MOUTH TWICE DAILY BEFORE MEAL(S), Disp: 180 tablet, Rfl: 1  •  methocarbamol (ROBAXIN) 500 MG tablet, Take 1 tablet by mouth 3 (Three) Times a Day As Needed for Muscle Spasms., Disp: 90 tablet, Rfl: 0  •  nystatin (MYCOSTATIN) 909098 UNIT/GM ointment, Apply 1 application topically to the appropriate area as directed 2 (Two) Times a Day., Disp: 30 g, Rfl: 0  •  celecoxib (CeleBREX) 100 MG capsule, Take 1 capsule by mouth 2 (Two) Times a Day As Needed for Mild Pain. (Patient not taking: Reported on 4/27/2023), Disp: 60 capsule, Rfl: 3         No follow-ups on file.      Part of this note may be  an electronic transcription/translation of spoken language to printed text using the Dragon Dictation System.

## 2023-04-27 NOTE — PATIENT INSTRUCTIONS
I would recommend you start taking the carvedilol medication twice per day, this helps with blood pressure and palpitations. It can sometimes cause fatigue, but rarely at this small dose. If you find the side effects are bad, please reach out to us and we can change the blood pressure medications.    We will obtain the heart ultrasound to check out the faint, small, heart murmur.    Increase your atorvastatin medication to 40mg, which is two 20mg tablets per night.

## 2023-05-01 RX ORDER — LEVOTHYROXINE SODIUM 0.05 MG/1
TABLET ORAL
Qty: 90 TABLET | Refills: 0 | Status: SHIPPED | OUTPATIENT
Start: 2023-05-01

## 2023-05-01 NOTE — TELEPHONE ENCOUNTER
Rx Refill Note  Requested Prescriptions     Pending Prescriptions Disp Refills   • levothyroxine (SYNTHROID, LEVOTHROID) 50 MCG tablet [Pharmacy Med Name: Levothyroxine Sodium 50 MCG Oral Tablet] 90 tablet 0     Sig: Take 1 tablet by mouth once daily      Last office visit with prescribing clinician: 3/14/2023   Last telemedicine visit with prescribing clinician: Visit date not found   Next office visit with prescribing clinician: Visit date not found                         Would you like a call back once the refill request has been completed: [] Yes [] No    If the office needs to give you a call back, can they leave a voicemail: [] Yes [] No    Malou Wood MA  05/01/23, 13:59 EDT

## 2023-05-08 NOTE — TELEPHONE ENCOUNTER
Rx Refill Note  Requested Prescriptions     Pending Prescriptions Disp Refills   • gabapentin (NEURONTIN) 300 MG capsule [Pharmacy Med Name: Gabapentin 300 MG Oral Capsule] 270 capsule 0     Sig: TAKE 1 CAPSULE BY MOUTH THREE TIMES DAILY      Last office visit with prescribing clinician: 3/14/2023   Last telemedicine visit with prescribing clinician: Visit date not found   Next office visit with prescribing clinician: Visit date not found                         Would you like a call back once the refill request has been completed: [] Yes [] No    If the office needs to give you a call back, can they leave a voicemail: [] Yes [] No    Malou Wood MA  05/08/23, 10:11 EDT

## 2023-05-09 RX ORDER — GABAPENTIN 300 MG/1
CAPSULE ORAL
Qty: 270 CAPSULE | Refills: 0 | Status: SHIPPED | OUTPATIENT
Start: 2023-05-09

## 2023-05-10 ENCOUNTER — HOSPITAL ENCOUNTER (OUTPATIENT)
Dept: CARDIOLOGY | Facility: HOSPITAL | Age: 73
Discharge: HOME | End: 2023-05-10
Admitting: STUDENT IN AN ORGANIZED HEALTH CARE EDUCATION/TRAINING PROGRAM
Payer: MEDICARE

## 2023-05-10 VITALS
HEIGHT: 62 IN | BODY MASS INDEX: 33.31 KG/M2 | DIASTOLIC BLOOD PRESSURE: 78 MMHG | HEART RATE: 65 BPM | WEIGHT: 181 LBS | SYSTOLIC BLOOD PRESSURE: 138 MMHG

## 2023-05-10 DIAGNOSIS — R01.1 HEART MURMUR: ICD-10-CM

## 2023-05-10 LAB
AORTIC ARCH: 2.7 CM
ASCENDING AORTA: 3.1 CM
BH CV ECHO MEAS - ACS: 1.74 CM
BH CV ECHO MEAS - AO MAX PG: 7.9 MMHG
BH CV ECHO MEAS - AO MEAN PG: 3.9 MMHG
BH CV ECHO MEAS - AO ROOT DIAM: 3.2 CM
BH CV ECHO MEAS - AO V2 MAX: 140.6 CM/SEC
BH CV ECHO MEAS - AO V2 VTI: 25.4 CM
BH CV ECHO MEAS - AVA(I,D): 1.82 CM2
BH CV ECHO MEAS - EDV(CUBED): 73.8 ML
BH CV ECHO MEAS - EDV(MOD-SP2): 54 ML
BH CV ECHO MEAS - EDV(MOD-SP4): 69 ML
BH CV ECHO MEAS - EF(MOD-BP): 57.8 %
BH CV ECHO MEAS - EF(MOD-SP2): 59.3 %
BH CV ECHO MEAS - EF(MOD-SP4): 58 %
BH CV ECHO MEAS - ESV(CUBED): 15 ML
BH CV ECHO MEAS - ESV(MOD-SP2): 22 ML
BH CV ECHO MEAS - ESV(MOD-SP4): 29 ML
BH CV ECHO MEAS - FS: 41.2 %
BH CV ECHO MEAS - IVS/LVPW: 0.91 CM
BH CV ECHO MEAS - IVSD: 0.91 CM
BH CV ECHO MEAS - LAT PEAK E' VEL: 8.4 CM/SEC
BH CV ECHO MEAS - LV DIASTOLIC VOL/BSA (35-75): 37.7 CM2
BH CV ECHO MEAS - LV MASS(C)D: 128.1 GRAMS
BH CV ECHO MEAS - LV MAX PG: 3.4 MMHG
BH CV ECHO MEAS - LV MEAN PG: 2.09 MMHG
BH CV ECHO MEAS - LV SYSTOLIC VOL/BSA (12-30): 15.8 CM2
BH CV ECHO MEAS - LV V1 MAX: 92.1 CM/SEC
BH CV ECHO MEAS - LV V1 VTI: 17.8 CM
BH CV ECHO MEAS - LVIDD: 4.2 CM
BH CV ECHO MEAS - LVIDS: 2.46 CM
BH CV ECHO MEAS - LVOT AREA: 2.6 CM2
BH CV ECHO MEAS - LVOT DIAM: 1.82 CM
BH CV ECHO MEAS - LVPWD: 1 CM
BH CV ECHO MEAS - MED PEAK E' VEL: 9.6 CM/SEC
BH CV ECHO MEAS - MV A DUR: 0.12 SEC
BH CV ECHO MEAS - MV A MAX VEL: 84.4 CM/SEC
BH CV ECHO MEAS - MV DEC SLOPE: 445.2 CM/SEC2
BH CV ECHO MEAS - MV DEC TIME: 0.22 MSEC
BH CV ECHO MEAS - MV E MAX VEL: 62.1 CM/SEC
BH CV ECHO MEAS - MV E/A: 0.74
BH CV ECHO MEAS - MV MAX PG: 5.7 MMHG
BH CV ECHO MEAS - MV MEAN PG: 2.28 MMHG
BH CV ECHO MEAS - MV P1/2T: 63.2 MSEC
BH CV ECHO MEAS - MV V2 VTI: 27.2 CM
BH CV ECHO MEAS - MVA(P1/2T): 3.5 CM2
BH CV ECHO MEAS - MVA(VTI): 1.7 CM2
BH CV ECHO MEAS - PA ACC TIME: 0.11 SEC
BH CV ECHO MEAS - PA PR(ACCEL): 30.3 MMHG
BH CV ECHO MEAS - PA V2 MAX: 121.2 CM/SEC
BH CV ECHO MEAS - PULM A REVS DUR: 0.1 SEC
BH CV ECHO MEAS - PULM A REVS VEL: 47.1 CM/SEC
BH CV ECHO MEAS - PULM DIAS VEL: 43.7 CM/SEC
BH CV ECHO MEAS - PULM S/D: 0.99
BH CV ECHO MEAS - PULM SYS VEL: 43.3 CM/SEC
BH CV ECHO MEAS - QP/QS: 0.85
BH CV ECHO MEAS - RV MAX PG: 2.7 MMHG
BH CV ECHO MEAS - RV V1 MAX: 81.8 CM/SEC
BH CV ECHO MEAS - RV V1 VTI: 14.6 CM
BH CV ECHO MEAS - RVOT DIAM: 1.85 CM
BH CV ECHO MEAS - SI(MOD-SP2): 17.5 ML/M2
BH CV ECHO MEAS - SI(MOD-SP4): 21.8 ML/M2
BH CV ECHO MEAS - SUP REN AO DIAM: 2.1 CM
BH CV ECHO MEAS - SV(LVOT): 46.2 ML
BH CV ECHO MEAS - SV(MOD-SP2): 32 ML
BH CV ECHO MEAS - SV(MOD-SP4): 40 ML
BH CV ECHO MEAS - SV(RVOT): 39.3 ML
BH CV ECHO MEAS - TAPSE (>1.6): 2.12 CM
BH CV ECHO MEASUREMENTS AVERAGE E/E' RATIO: 6.9
BH CV XLRA - RV BASE: 2.9 CM
BH CV XLRA - RV LENGTH: 6.1 CM
BH CV XLRA - RV MID: 2.5 CM
BH CV XLRA - TDI S': 14.1 CM/SEC
LEFT ATRIUM VOLUME INDEX: 18.8 ML/M2
MAXIMAL PREDICTED HEART RATE: 150 BPM
SINUS: 3.1 CM
STJ: 3 CM
STRESS TARGET HR: 128 BPM

## 2023-05-10 PROCEDURE — 93306 TTE W/DOPPLER COMPLETE: CPT | Performed by: INTERNAL MEDICINE

## 2023-05-10 PROCEDURE — 93306 TTE W/DOPPLER COMPLETE: CPT

## 2023-05-10 NOTE — PROGRESS NOTES
Can we please call Ms. Garcia and let her know that her heart ultrasound was normal.  All of the heart muscle is working normally and there are no signs of prior heart attacks.  It also shows normal heart pressures and no evidence of any significant heart valve issues to cause a heart murmur.  Overall this is a very reassuring echo.    Thank you for the help

## 2023-05-11 NOTE — PROGRESS NOTES
Reviewed results with Pearl Christiansen and patient verbalized understanding of results.    Thank you,  Radha HEWITT RN  Triage Nurse [facility] [place]

## 2023-05-17 RX ORDER — SITAGLIPTIN 50 MG/1
TABLET, FILM COATED ORAL
Qty: 90 TABLET | Refills: 0 | Status: SHIPPED | OUTPATIENT
Start: 2023-05-17

## 2023-06-05 RX ORDER — AMLODIPINE BESYLATE 5 MG/1
TABLET ORAL
Qty: 90 TABLET | Refills: 0 | Status: SHIPPED | OUTPATIENT
Start: 2023-06-05

## 2023-06-12 RX ORDER — DICYCLOMINE HYDROCHLORIDE 10 MG/1
CAPSULE ORAL
Qty: 90 CAPSULE | Refills: 0 | Status: SHIPPED | OUTPATIENT
Start: 2023-06-12

## 2023-06-12 NOTE — TELEPHONE ENCOUNTER
Rx Refill Note  Requested Prescriptions     Pending Prescriptions Disp Refills    dicyclomine (BENTYL) 10 MG capsule [Pharmacy Med Name: Dicyclomine HCl 10 MG Oral Capsule] 90 capsule 0     Sig: TAKE 1 CAPSULE BY MOUTH THREE TIMES DAILY AS NEEDED FOR  DIARRHEA  OR  ABDOMINAL  PAIN      Last office visit with prescribing clinician: 3/14/2023   Last telemedicine visit with prescribing clinician: Visit date not found   Next office visit with prescribing clinician: Visit date not found                         Would you like a call back once the refill request has been completed: [] Yes [] No    If the office needs to give you a call back, can they leave a voicemail: [] Yes [] No    Nikole Causey MA  06/12/23, 13:39 EDT

## 2023-07-24 RX ORDER — LEVOTHYROXINE SODIUM 0.05 MG/1
50 TABLET ORAL DAILY
Qty: 90 TABLET | Refills: 0 | Status: SHIPPED | OUTPATIENT
Start: 2023-07-24

## 2023-08-24 RX ORDER — SITAGLIPTIN 50 MG/1
TABLET, FILM COATED ORAL
Qty: 90 TABLET | Refills: 0 | Status: SHIPPED | OUTPATIENT
Start: 2023-08-24

## 2023-08-29 RX ORDER — AMLODIPINE BESYLATE 5 MG/1
TABLET ORAL
Qty: 90 TABLET | Refills: 0 | Status: SHIPPED | OUTPATIENT
Start: 2023-08-29

## 2023-08-29 NOTE — TELEPHONE ENCOUNTER
Rx Refill Note  Requested Prescriptions     Pending Prescriptions Disp Refills    amLODIPine (NORVASC) 5 MG tablet [Pharmacy Med Name: amLODIPine Besylate 5 MG Oral Tablet] 90 tablet 0     Sig: Take 1 tablet by mouth once daily      Last office visit with prescribing clinician: 3/14/2023   Last telemedicine visit with prescribing clinician: Visit date not found   Next office visit with prescribing clinician: Visit date not found                         Would you like a call back once the refill request has been completed: [] Yes [] No    If the office needs to give you a call back, can they leave a voicemail: [] Yes [] No    Josh Hernandez MA  08/29/23, 13:09 EDT

## 2023-09-13 RX ORDER — DICYCLOMINE HYDROCHLORIDE 10 MG/1
CAPSULE ORAL
Qty: 90 CAPSULE | Refills: 0 | Status: SHIPPED | OUTPATIENT
Start: 2023-09-13

## 2023-09-26 RX ORDER — METFORMIN HYDROCHLORIDE 500 MG/1
TABLET, EXTENDED RELEASE ORAL
Qty: 180 TABLET | Refills: 0 | Status: SHIPPED | OUTPATIENT
Start: 2023-09-26

## 2023-09-26 RX ORDER — GABAPENTIN 300 MG/1
CAPSULE ORAL
Qty: 270 CAPSULE | Refills: 0 | Status: SHIPPED | OUTPATIENT
Start: 2023-09-26

## 2023-09-26 NOTE — TELEPHONE ENCOUNTER
Rx Refill Note  Requested Prescriptions     Pending Prescriptions Disp Refills    gabapentin (NEURONTIN) 300 MG capsule [Pharmacy Med Name: Gabapentin 300 MG Oral Capsule] 270 capsule 0     Sig: TAKE 1 CAPSULE BY MOUTH THREE TIMES DAILY    metFORMIN ER (GLUCOPHAGE-XR) 500 MG 24 hr tablet [Pharmacy Med Name: metFORMIN HCl  MG Oral Tablet Extended Release 24 Hour] 180 tablet 0     Sig: TAKE 1 TABLET BY MOUTH TWICE DAILY BEFORE MEAL(S)      Last office visit with prescribing clinician: 3/14/2023   Last telemedicine visit with prescribing clinician: Visit date not found   Next office visit with prescribing clinician: Visit date not found                         Would you like a call back once the refill request has been completed: [] Yes [] No    If the office needs to give you a call back, can they leave a voicemail: [] Yes [] No    Josh Hernandez MA  09/26/23, 12:56 EDT

## 2023-10-05 RX ORDER — DICYCLOMINE HYDROCHLORIDE 10 MG/1
CAPSULE ORAL
Qty: 90 CAPSULE | Refills: 0 | Status: SHIPPED | OUTPATIENT
Start: 2023-10-05

## 2023-10-05 NOTE — TELEPHONE ENCOUNTER
Rx Refill Note  Requested Prescriptions     Pending Prescriptions Disp Refills    dicyclomine (BENTYL) 10 MG capsule [Pharmacy Med Name: Dicyclomine HCl 10 MG Oral Capsule] 90 capsule 0     Sig: TAKE 1 CAPSULE BY MOUTH THREE TIMES DAILY AS NEEDED FOR  DIARRHEA  OR  ABDOMINAL  PAIN      Last office visit with prescribing clinician: Visit date not found   Last telemedicine visit with prescribing clinician: Visit date not found   Next office visit with prescribing clinician: Visit date not found                         Would you like a call back once the refill request has been completed: [] Yes [] No    If the office needs to give you a call back, can they leave a voicemail: [] Yes [] No    Josh Hernandez MA  10/05/23, 13:29 EDT

## 2023-10-06 ENCOUNTER — OFFICE VISIT (OUTPATIENT)
Dept: FAMILY MEDICINE CLINIC | Facility: CLINIC | Age: 73
End: 2023-10-06
Payer: MEDICARE

## 2023-10-06 VITALS
HEIGHT: 62 IN | OXYGEN SATURATION: 95 % | TEMPERATURE: 97.8 F | DIASTOLIC BLOOD PRESSURE: 62 MMHG | HEART RATE: 79 BPM | SYSTOLIC BLOOD PRESSURE: 128 MMHG | WEIGHT: 178.3 LBS | BODY MASS INDEX: 32.81 KG/M2

## 2023-10-06 DIAGNOSIS — M79.671 BILATERAL FOOT PAIN: Primary | ICD-10-CM

## 2023-10-06 DIAGNOSIS — M79.672 BILATERAL FOOT PAIN: Primary | ICD-10-CM

## 2023-10-06 DIAGNOSIS — R25.2 LEG CRAMPS: ICD-10-CM

## 2023-10-06 PROCEDURE — 99213 OFFICE O/P EST LOW 20 MIN: CPT | Performed by: INTERNAL MEDICINE

## 2023-10-06 PROCEDURE — 3051F HG A1C>EQUAL 7.0%<8.0%: CPT | Performed by: INTERNAL MEDICINE

## 2023-10-06 NOTE — PROGRESS NOTES
"Chief Complaint  leg cramp (B/L.  For months, just been getting worse recently.  Occurs mainly at night, late evening.  No OTC, except for \"pain roller\")    Luis Enrique Christiansen presents to [facility]  History of Present Illness  C/o leg cramps that occur at night for past 3-4 weeks.  They wake her from sleep and start with turning her foot or moving leg a certain way.  Her feet turn inward at the time of cramp.  They only occur at night and after she has been on her feet at work.  Her feet hurt a lot and therefore she walks differently b/c of the foot pain.  Her gait is affected due to ankle and top of foot pain. No swelling  She is in retail pharmacy.  Takes tylenol which may help a little.  She was taking celebrex 100 mg qd given by pain management but she stopped it for some reason.  She also stopped asa/  no PUD and no GERD sx.      Objective   Vital Signs:  /62   Pulse 79   Temp 97.8 °F (36.6 °C) (Temporal)   Ht 157.5 cm (62.01\")   Wt 80.9 kg (178 lb 4.8 oz)   SpO2 95% Comment: no lightheadedness or dizziness  BMI 32.60 kg/m²   Estimated body mass index is 32.6 kg/m² as calculated from the following:    Height as of this encounter: 157.5 cm (62.01\").    Weight as of this encounter: 80.9 kg (178 lb 4.8 oz).             Physical Exam  Vitals and nursing note reviewed.   Constitutional:       Appearance: Normal appearance.   HENT:      Head: Normocephalic and atraumatic.   Eyes:      Conjunctiva/sclera: Conjunctivae normal.   Pulmonary:      Effort: Pulmonary effort is normal.   Musculoskeletal:      Right lower leg: No edema.      Left lower leg: No edema.   Neurological:      General: No focal deficit present.      Mental Status: She is alert and oriented to person, place, and time.      Gait: Gait normal.   Psychiatric:         Mood and Affect: Mood normal.         Behavior: Behavior normal.         Thought Content: Thought content normal.         " Judgment: Judgment normal.      Result Review :                   Assessment and Plan   Diagnoses and all orders for this visit:    1. Bilateral foot pain (Primary)  -     Ambulatory Referral to Orthopedic Surgery    2. Leg cramps  -     Ambulatory Referral to Orthopedic Surgery      We discussed that she might have arthritis in feet and therefore I have rec orthop evaluation.  I also have asked her to change to a very good work shoe and have referred her to [employer] or [employer] for shoes.  She will increase electrolytes as well with a vitamin water or G2 gatorade.  Her PAD has improved with grated exercise.  She didn't tolerate pletal due to GI side effects.  Restart asa/day 81 mg  and she can take prn celebrex for severe pain.  Cautioned on potential side effects with stomach and kidneys.  Best if taken with food and a lot of water and prn.  She voiced understanding.       Follow Up   No follow-ups on file.  Patient was given instructions and counseling regarding her condition or for health maintenance advice. Please see specific information pulled into the AVS if appropriate.

## 2023-10-08 DIAGNOSIS — G89.29 CHRONIC LEFT HIP PAIN: ICD-10-CM

## 2023-10-08 DIAGNOSIS — M16.12 PRIMARY OSTEOARTHRITIS OF LEFT HIP: ICD-10-CM

## 2023-10-08 DIAGNOSIS — M25.552 CHRONIC LEFT HIP PAIN: ICD-10-CM

## 2023-10-09 RX ORDER — CELECOXIB 100 MG/1
100 CAPSULE ORAL 2 TIMES DAILY PRN
Qty: 60 CAPSULE | Refills: 0 | Status: SHIPPED | OUTPATIENT
Start: 2023-10-09

## 2023-10-11 ENCOUNTER — OFFICE VISIT (OUTPATIENT)
Dept: ORTHOPEDIC SURGERY | Facility: CLINIC | Age: 73
End: 2023-10-11
Payer: MEDICARE

## 2023-10-11 VITALS — BODY MASS INDEX: 35.34 KG/M2 | TEMPERATURE: 97.8 F | WEIGHT: 180 LBS | HEIGHT: 60 IN

## 2023-10-11 DIAGNOSIS — M19.071 ARTHRITIS OF BOTH FEET: ICD-10-CM

## 2023-10-11 DIAGNOSIS — M19.072 ARTHRITIS OF BOTH FEET: ICD-10-CM

## 2023-10-11 DIAGNOSIS — R52 PAIN: Primary | ICD-10-CM

## 2023-10-11 RX ORDER — LEVOTHYROXINE SODIUM 0.05 MG/1
50 TABLET ORAL DAILY
Qty: 90 TABLET | Refills: 0 | Status: SHIPPED | OUTPATIENT
Start: 2023-10-11

## 2023-10-11 NOTE — PROGRESS NOTES
Answers submitted by the patient for this visit:  Other (Submitted on 10/7/2023)  Please describe your symptoms.: Foot pain and leg cramps  Have you had these symptoms before?: Yes  How long have you been having these symptoms?: Greater than 2 weeks  Please list any medications you are currently taking for this condition.: Tylenol  Primary Reason for Visit (Submitted on 10/7/2023)  What is the primary reason for your visit?: Other  Patient Name: Pearl Christiansen   YOB: 1950  Referring Primary Care Physician: Jesi Etses MD  BMI: Body mass index is 35.15 kg/mý.    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  Pain improves when she is not standing no knee pain no history of trauma or injury    Pearl Christiansen is a 73 y.o. female who presents today for evaluation of   Chief Complaint   Patient presents with    Right Foot - Pain    Left Foot - Pain         Subjective   Medications:   Home Medications:  Current Outpatient Medications on File Prior to Visit   Medication Sig    Acetaminophen (TYLENOL PO) Take 650 mg by mouth 2 (Two) Times a Day.    amLODIPine (NORVASC) 5 MG tablet Take 1 tablet by mouth once daily    atorvastatin (LIPITOR) 40 MG tablet Take 1 tablet by mouth Every Night.    carvedilol (COREG) 3.125 MG tablet Take 1 tablet by mouth 2 (Two) Times a Day.    celecoxib (CeleBREX) 100 MG capsule TAKE 1 CAPSULE BY MOUTH TWICE DAILY AS NEEDED FOR MILD PAIN    clobetasol (TEMOVATE) 0.05 % cream Apply 1 application topically to the appropriate area as directed every night at  bedtime.    Diclofenac Sodium (VOLTAREN) 1 % gel gel Apply 4 g topically to the appropriate area as directed 4 (Four) Times a Day.    dicyclomine (BENTYL) 10 MG capsule TAKE 1 CAPSULE BY MOUTH THREE TIMES DAILY AS NEEDED FOR  DIARRHEA  OR  ABDOMINAL  PAIN    escitalopram (LEXAPRO) 10 MG tablet Take 1 tablet by mouth once daily    esomeprazole (nexIUM) 40 MG capsule Take 1 capsule by mouth Every Morning Before Breakfast.    fluticasone (FLONASE) 50 MCG/ACT nasal spray 2 sprays into the nostril(s) as directed by provider Daily. prn    gabapentin (NEURONTIN) 300 MG capsule TAKE 1 CAPSULE BY MOUTH THREE TIMES DAILY    Januvia 50 MG tablet Take 1 tablet by mouth once daily    metFORMIN ER (GLUCOPHAGE-XR) 500 MG 24 hr tablet TAKE 1 TABLET BY MOUTH TWICE DAILY BEFORE MEAL(S)    methocarbamol (ROBAXIN) 500 MG tablet Take 1 tablet by mouth 3 (Three) Times a Day As Needed for Muscle Spasms.    nystatin (MYCOSTATIN) 498725 UNIT/GM ointment Apply 1 application topically to the appropriate area as directed 2 (Two) Times a Day.    aspirin 81 MG EC tablet Take 1 tablet by mouth Daily. (Patient not taking: Reported on 10/6/2023)    colestipol (COLESTID) 1 g tablet Take 1 tablet by mouth 2 (Two) Times a Day. (Patient not taking: Reported on 10/6/2023)     No current facility-administered medications on file prior to visit.     Current Medications:  Scheduled Meds:  Continuous Infusions:No current facility-administered medications for this visit.    PRN Meds:.    I have reviewed the patient's medical history in detail and updated the computerized patient record.  Review and summarization of old records includes:    Past Medical History:   Diagnosis Date    Allergic     Anxiety     Arthritis     Arthritis of back     Arthritis of neck     CAD (coronary artery disease)     50% mid LAD by cath on 8/14/2017    Depression     Diabetes mellitus     Diverticulosis     Fatty liver 2018    GERD (gastroesophageal reflux disease)     Hip  arthrosis     Hyperlipidemia     Hypertension     Irritable bowel syndrome     Knee swelling     Low back pain     Low back strain     Obesity     ALEXANDER (obstructive sleep apnea)     PAD (peripheral artery disease)     Stress fracture         Past Surgical History:   Procedure Laterality Date    ADENOIDECTOMY      CARDIAC CATHETERIZATION Left 2017    [facility]    CHOLECYSTECTOMY      COLONOSCOPY      FOOT SURGERY      GALLBLADDER SURGERY      HAND SURGERY      RECTOVAGINAL FISTULA REPAIR      STEROID INJECTION HIP Left 2022    Procedure: HIP INJECTION UNDER FLUORO - left;  Surgeon: Rhona Hammer MD;  [place] [facility] [place];  Service: Pain Management;  Laterality: Left;    TONSILLECTOMY      TRIGGER FINGER RELEASE      TRIGGER POINT INJECTION          Social History     Occupational History    Not on file   Tobacco Use    Smoking status: Former     Packs/day: 1.00     Years: 30.00     Additional pack years: 0.00     Total pack years: 30.00     Types: Cigarettes     Start date: 1965     Quit date: 1991     Years since quittin.3    Smokeless tobacco: Never    Tobacco comments:     quit in her 40's   Vaping Use    Vaping Use: Never used   Substance and Sexual Activity    Alcohol use: Not Currently     Comment: 1 drink every blue moon    Drug use: Never    Sexual activity: Not Currently     Partners: Male     Birth control/protection: None      Social History     Social History Narrative    Not on file        Family History   Problem Relation Age of Onset    Hypertension Mother     COPD Mother     Alcohol abuse Brother     Colon cancer Neg Hx     Colon polyps Neg Hx     Crohn's disease Neg Hx     Irritable bowel syndrome Neg Hx     Ulcerative colitis Neg Hx        ROS: 14 point review of systems was performed and all other systems were reviewed and are negative except for documented findings in HPI and today's encounter.     Allergies:   Allergies   Allergen Reactions    Lidocaine Other (See  "Comments)     Does not work on pt    Keflex [Cephalexin] Rash    Penicillins Rash     Constitutional:  Denies fever, shaking or chills   Eyes:  Denies change in visual acuity   HENT:  Denies nasal congestion or sore throat   Respiratory:  Denies cough or shortness of breath   Cardiovascular:  Denies chest pain or severe LE edema   GI:  Denies abdominal pain, nausea, vomiting, bloody stools or diarrhea   Musculoskeletal:  Numbness, tingling, pain, or loss of motor function only as noted above in history of present illness.  : Denies painful urination or hematuria  Integument:  Denies rash, lesion or ulceration   Neurologic:  Denies headache or focal weakness  Endocrine:  Denies lymphadenopathy  Psych:  Denies confusion or change in mental status   Hem:  Denies active bleeding    OBJECTIVE:  Physical Exam: 73 y.o. female  Wt Readings from Last 3 Encounters:   10/11/23 81.6 kg (180 lb)   10/06/23 80.9 kg (178 lb 4.8 oz)   05/10/23 82.1 kg (181 lb)     Ht Readings from Last 1 Encounters:   10/11/23 152.4 cm (60\")     Body mass index is 35.15 kg/mý.  Vitals:    10/11/23 0916   Temp: 97.8 øF (36.6 øC)     Vital signs reviewed.     General Appearance:    Alert, cooperative, in no acute distress                  Eyes: conjunctiva clear  ENT: external ears and nose atraumatic  CV: no peripheral edema  Resp: normal respiratory effort  Skin: no rashes or wounds; normal turgor  Psych: mood and affect appropriate  Lymph: no nodes appreciated  Neuro: gross sensation intact  Vascular:  Palpable peripheral pulse in noted extremity  Musculoskeletal Extremities: Skin warm dry intact with good pulses and sensation both feet have tenderness over the calcaneus mid aspect and diffusely neurovascular intact good pulses sensation there is no pitting edema or obvious swelling calves are soft and nontender    Radiology: 3 views of both feet were done for pain with no comparison views show degenerative changes with calcaneal spurs no acute " fracture      Assessment:     ICD-10-CM ICD-9-CM   1. Pain  R52 780.96   2. Arthritis of both feet  M19.071 716.97    M19.072         Procedures  She could have an occult fracture or stress fracture without MRIs he would now be advised to have her off work rest ice elevate get some better supportive shoes with more support in the soles and look at getting a job that is got sitdown options    MDM/Plan:   The diagnosis(es), natural history, pathophysiology and treatment for diagnosis(es) were discussed. Opportunity given and questions answered.  Biomechanics of pertinent body areas discussed.  When appropriate, the use of ambulatory aids discussed.  BMI:  The concept of BMI body mass index and its importance and implications discussed.    EXERCISES:  Advice on benefits of, and types of regular/moderate exercise pertaining to orthopedic diagnosis(es).  MEDICATIONS:  The risks, benefits, warnings,side effects and alternatives of medications discussed.  Inflammation/pain control; with cold, heat, elevation and/or liniments discussed as appropriate  MEDICAL RECORDS reviewed from other provider(s) for past and current medical history pertinent to this complaint.      10/12/2023    Much of this encounter note is an electronic transcription/translation of spoken language to printed text. The electronic translation of spoken language may permit erroneous, or at times, nonsensical words or phrases to be inadvertently transcribed; Although I have reviewed the note for such errors, some may still exist

## 2023-10-19 ENCOUNTER — TELEPHONE (OUTPATIENT)
Dept: FAMILY MEDICINE CLINIC | Facility: CLINIC | Age: 73
End: 2023-10-19

## 2023-10-19 DIAGNOSIS — R25.2 LEG CRAMPS: Primary | ICD-10-CM

## 2023-10-19 NOTE — TELEPHONE ENCOUNTER
"  Caller: Pearl Christiansen \"Ramiro\"    Relationship: Self    Best call back number: [phone]     What was the call regarding: PATIENT STATES SHE HAS BEEN HAVING LEG CRAMPS IN HER LEGS THE LAST THREE NIGHTS. THEY HAVE BEEN KEEPING HER AWAKE AT NIGHT AND ARE VERY PAINFUL. PLEASE ADVISE ON NEXT STEPS AND IF DR VALENCIA WANTS TO SEE HER.   "

## 2023-10-20 ENCOUNTER — TELEPHONE (OUTPATIENT)
Dept: FAMILY MEDICINE CLINIC | Facility: CLINIC | Age: 73
End: 2023-10-20
Payer: MEDICARE

## 2023-10-20 NOTE — TELEPHONE ENCOUNTER
Patient calling for dr. Estes to let her know the leg cramps happen mostly at night but will also occur as a twitching during the day and if she moves they become the cramps.

## 2023-10-24 ENCOUNTER — TELEPHONE (OUTPATIENT)
Dept: FAMILY MEDICINE CLINIC | Facility: CLINIC | Age: 73
End: 2023-10-24

## 2023-10-24 DIAGNOSIS — D64.9 ANEMIA, UNSPECIFIED TYPE: Primary | ICD-10-CM

## 2023-10-24 NOTE — TELEPHONE ENCOUNTER
"Caller: Pearl Christiansen \"Ramiro\"    Relationship: Self    Best call back number: [phone]     What was the call regarding: THE PATIENT WAS TOLD IN HER LAB RESULTS TO STOP TAKING  celecoxib (CeleBREX) 100 MG capsule   DUE TO HER KIDNEY FUNCTION.    BUT THE PATIENT SAYS SHE DOESN'T TAKE IT UNLESS SHE \"ABSOLUTELY HAS TO,\" THEREFORE SHE HASN'T TAKEN IT IN QUITE A WHILE    PATIENT ASKS IF DR. VALENCIA WOULD REVIEW HER OTHER MEDICATIONS TO SEE IF THEY COULD EFFECTING HER KIDNEYS.  "

## 2023-11-02 ENCOUNTER — OFFICE VISIT (OUTPATIENT)
Dept: CARDIOLOGY | Facility: CLINIC | Age: 73
End: 2023-11-02
Payer: MEDICARE

## 2023-11-02 VITALS
HEIGHT: 62 IN | OXYGEN SATURATION: 95 % | DIASTOLIC BLOOD PRESSURE: 78 MMHG | HEART RATE: 75 BPM | SYSTOLIC BLOOD PRESSURE: 122 MMHG | BODY MASS INDEX: 33.57 KG/M2 | WEIGHT: 182.4 LBS

## 2023-11-02 DIAGNOSIS — E78.00 PURE HYPERCHOLESTEROLEMIA: Primary | ICD-10-CM

## 2023-11-02 DIAGNOSIS — I49.1 PREMATURE ATRIAL CONTRACTIONS: ICD-10-CM

## 2023-11-02 DIAGNOSIS — R00.2 PALPITATIONS: ICD-10-CM

## 2023-11-02 DIAGNOSIS — I73.9 PVD (PERIPHERAL VASCULAR DISEASE) WITH CLAUDICATION: ICD-10-CM

## 2023-11-02 DIAGNOSIS — E08.9 DIABETES MELLITUS DUE TO UNDERLYING CONDITION WITHOUT COMPLICATION, WITHOUT LONG-TERM CURRENT USE OF INSULIN: ICD-10-CM

## 2023-11-02 DIAGNOSIS — E11.65 TYPE 2 DIABETES MELLITUS WITH HYPERGLYCEMIA, WITHOUT LONG-TERM CURRENT USE OF INSULIN: ICD-10-CM

## 2023-11-02 PROBLEM — D64.9 ANEMIA: Status: ACTIVE | Noted: 2023-11-02

## 2023-11-02 RX ORDER — SACCHAROMYCES BOULARDII 250 MG
250 CAPSULE ORAL 2 TIMES DAILY
COMMUNITY

## 2023-11-02 NOTE — PATIENT INSTRUCTIONS
I would recommend you look into an [employer] Kardiamobile device, that can be obtained at [employer], [employer], etc. The regular (not 6L) is fine for what we need. This can be used to make an EKG during a spell that we didn't capture otherwise.

## 2023-11-02 NOTE — PROGRESS NOTES
[facility]    Subjective:     Encounter Date:11/02/23      Patient ID: Pearl Christiansen is a 73 y.o. female.    Chief Complaint:   No chief complaint on file.   Follow-up palpitations  History of Present Illness    Ms. Garcia is a pleasant 72-year-old lady past medical history hypertension, diabetes, nonobstructive coronary artery disease, sleep apnea on CPAP, peripheral vascular disease with left lower extremity claudication who presents for follow-up    She previously followed with Dr. Hutson in our office.  She has had a episodes of epigastric discomfort which have been on and off over the last several years.  Due to her nonobstructive CAD, she was evaluated for cardiac cause.  She was admitted to [facility] for this and she underwent a Lexiscan nuclear stress test which showed a possible apical infarct but no ischemia.  It was thought to be GI origin.    She has been investigated for palpitations in the past and has had Holter's that show intermittent PACs, but nothing more significant.      She reports her symptShe continues to have intermittent palpitation episodes where she will have a few minutes to hours of palpitations where she feels her heart beat prominently, then goes away.  She also has some reproducible pain in the lower portion of her breast in the middle portion of her chest that reproduces with palpation.      She continues graded exercise to help with peripheral vascular claudication    Cardiac Procedures:  Left cardiac catheterization in [place] 8/14/2017.  Left main was normal.  LAD 50% mid disease.  Circumflex had luminal irregularities.  RCA was dominant with luminal irregularities.  CTA chest at [facility] 10/5/2021.  No PE or aortic pathology noted.  Myocardial perfusion stress test 10/6/2021.  Small apical defect consistent with artifact.  No ischemia.  Echocardiogram 11/24/2021.  LVEF 61-65%.  Mild hypertrophy.  Grade 1 diastolic dysfunction.   "Normal RV cavity size and systolic function.  No evidence of pericardial effusion.  24-hour monitor 11/30/2021.  Normal monitor study.  Patient had total of 7 PACs.  ZIO monitor 1/30/2022.  Underlying rhythm was sinus with average heart rate 85.  SVT ectopy less than 1%  Echocardiogram May 10, 2023: Normal LVEF 57.8%.  Grade 1 diastolic dysfunction.  Otherwise unremarkable.      The following portions of the patient's history were reviewed and updated as appropriate: allergies, current medications, past family history, past medical history, past social history, past surgical history and problem list.    Past Medical History:   Diagnosis Date    Allergic     Anxiety     Arthritis     Arthritis of back     Arthritis of neck     CAD (coronary artery disease)     50% mid LAD by cath on 8/14/2017    Depression     Diabetes mellitus     Diverticulosis     Fatty liver 2018    GERD (gastroesophageal reflux disease)     Hip arthrosis     Hyperlipidemia     Hypertension     Irritable bowel syndrome     Knee swelling     Low back pain     Low back strain     Obesity     ALEXANDER (obstructive sleep apnea)     PAD (peripheral artery disease)     Stress fracture        Past Surgical History:   Procedure Laterality Date    ADENOIDECTOMY      CARDIAC CATHETERIZATION Left 08/14/2017    [facility]    CHOLECYSTECTOMY      COLONOSCOPY      FOOT SURGERY      GALLBLADDER SURGERY      HAND SURGERY      RECTOVAGINAL FISTULA REPAIR      STEROID INJECTION HIP Left 11/21/2022    Procedure: HIP INJECTION UNDER FLUORO - left;  Surgeon: Rhona Hammer MD;  [place] [facility] [place];  Service: Pain Management;  Laterality: Left;    TONSILLECTOMY      TRIGGER FINGER RELEASE      TRIGGER POINT INJECTION           Procedures       Objective:     Vitals:    11/02/23 1118   BP: 122/78   BP Location: Left arm   Patient Position: Sitting   Cuff Size: Large Adult   Pulse: 75   SpO2: 95%   Weight: 82.7 kg (182 lb 6.4 oz)   Height: 157.5 cm (62\")         " Constitutional:       Appearance: Healthy appearance. Not in distress.   Neck:      Vascular: JVD normal.   Pulmonary:      Effort: Pulmonary effort is normal.      Breath sounds: Normal breath sounds.   Cardiovascular:      PMI at left midclavicular line. Normal rate. Regular rhythm. Normal S2.       Murmurs: There is a grade 1/6 harsh midsystolic murmur at the URSB, radiating to the neck.      Comments: No carotid bruits detected  Pulses:     Intact distal pulses.   Edema:     Peripheral edema absent.   Skin:     General: Skin is warm and dry.   Neurological:      General: No focal deficit present.      Mental Status: Alert, oriented to person, place, and time and oriented to person, place and time.   Psychiatric:         Mood and Affect: Mood and affect normal.         Lab Review:     Lipid Panel          3/24/2023    10:26   Lipid Panel   Total Cholesterol 150    Triglycerides 251    HDL Cholesterol 43    VLDL Cholesterol 40    LDL Cholesterol  67    LDL/HDL Ratio 1.32      BUN   Date Value Ref Range Status   10/20/2023 17 8 - 23 mg/dL Final   03/24/2023 21 8 - 23 mg/dL Final   10/22/2019 19 9.8 - 20.1 mg/dL Final     Creatinine   Date Value Ref Range Status   10/20/2023 1.08 (H) 0.57 - 1.00 mg/dL Final   03/24/2023 0.97 0.57 - 1.00 mg/dL Final   12/10/2021 0.90 0.60 - 1.30 mg/dL Final     Comment:     Serial Number: [id number]:  [id number]   10/22/2019 1.02 0.57 - 1.11 mg/dL Final     Potassium   Date Value Ref Range Status   10/20/2023 4.8 3.5 - 5.2 mmol/L Final   03/24/2023 4.5 3.5 - 5.2 mmol/L Final   10/22/2019 4.8 3.5 - 5.1 mmol/L Final     ALT (SGPT)   Date Value Ref Range Status   10/20/2023 13 1 - 33 U/L Final   03/24/2023 14 1 - 33 U/L Final   10/22/2019 20 0 - 61 U/L Final     AST (SGOT)   Date Value Ref Range Status   10/20/2023 18 1 - 32 U/L Final   03/24/2023 20 1 - 32 U/L Final   10/22/2019 21 5 - 34 U/L Final         Performed        Assessment:          Diagnosis Plan   1. Pure  hypercholesterolemia        2. Type 2 diabetes mellitus with hyperglycemia, without long-term current use of insulin        3. PVD (peripheral vascular disease) with claudication  Lipid Panel      4. Palpitations  Lipid Panel      5. Premature atrial contractions        6. Diabetes mellitus due to underlying condition without complication, without long-term current use of insulin  Lipid Panel             Plan:         Coronary artery disease, nonobstructive: Possible apical infarct on SPECT at [place], but this was not revealed on echocardiogram.  She gives no history of angina right now.  Cardiac cath 2017 showed 50% mid LAD lesion  Chest pain today underneath her left breast and left sternal area reproducible on exam.  Encourage patient to take Tylenol for this.     Cardiac murmur.  Likely flow murmur.  Echo was unremarkable.  Palpitations, PACs  Continue carvedilol.  She reports mild improvement in her symptoms with this.    We have not captured any other significant arrhythmias besides occasional PACs.  After discussion with patient, she is amenable to obtaining an [employer] device to monitor for any more of her fleeting palpitation episodes.    Her fatigue does not seem to been affected by carvedilol.  Continue    Aspirin 81 daily  Peripheral vascular disease, hyperlipidemia: LDL controlled on most recent evaluation.  Continue Lipitor.  We will recheck her lipid panel, have placed an order to have it drawn at the next time she gets labs.      RTC 6 months.     Harvinder Ramon MD  [facility]  11/02/23  08:59 EDT       Current Outpatient Medications:     Acetaminophen (TYLENOL PO), Take 650 mg by mouth 2 (Two) Times a Day., Disp: , Rfl:     amLODIPine (NORVASC) 5 MG tablet, Take 1 tablet by mouth once daily, Disp: 90 tablet, Rfl: 0    atorvastatin (LIPITOR) 40 MG tablet, Take 1 tablet by mouth Every Night., Disp: 90 tablet, Rfl: 3    carvedilol (COREG) 3.125 MG tablet, Take 1 tablet by  mouth 2 (Two) Times a Day., Disp: 60 tablet, Rfl: 11    clobetasol (TEMOVATE) 0.05 % cream, Apply 1 application topically to the appropriate area as directed every night at bedtime., Disp: 45 g, Rfl: 0    dicyclomine (BENTYL) 10 MG capsule, TAKE 1 CAPSULE BY MOUTH THREE TIMES DAILY AS NEEDED FOR  DIARRHEA  OR  ABDOMINAL  PAIN, Disp: 90 capsule, Rfl: 0    escitalopram (LEXAPRO) 10 MG tablet, Take 1 tablet by mouth once daily, Disp: 90 tablet, Rfl: 1    esomeprazole (nexIUM) 40 MG capsule, Take 1 capsule by mouth Every Morning Before Breakfast., Disp: 90 capsule, Rfl: 3    fluticasone (FLONASE) 50 MCG/ACT nasal spray, 2 sprays into the nostril(s) as directed by provider Daily. prn, Disp: , Rfl:     gabapentin (NEURONTIN) 300 MG capsule, TAKE 1 CAPSULE BY MOUTH THREE TIMES DAILY, Disp: 270 capsule, Rfl: 0    Januvia 50 MG tablet, Take 1 tablet by mouth once daily, Disp: 90 tablet, Rfl: 0    levothyroxine (SYNTHROID, LEVOTHROID) 50 MCG tablet, Take 1 tablet by mouth once daily, Disp: 90 tablet, Rfl: 0    Magnesium 100 MG capsule, Take  by mouth., Disp: , Rfl:     metFORMIN ER (GLUCOPHAGE-XR) 500 MG 24 hr tablet, TAKE 1 TABLET BY MOUTH TWICE DAILY BEFORE MEAL(S), Disp: 180 tablet, Rfl: 0    methocarbamol (ROBAXIN) 500 MG tablet, Take 1 tablet by mouth 3 (Three) Times a Day As Needed for Muscle Spasms., Disp: 90 tablet, Rfl: 0    nystatin (MYCOSTATIN) [id number] UNIT/GM ointment, Apply 1 application topically to the appropriate area as directed 2 (Two) Times a Day., Disp: 30 g, Rfl: 0    saccharomyces boulardii (FLORASTOR) 250 MG capsule, Take 1 capsule by mouth 2 (Two) Times a Day., Disp: , Rfl:     aspirin 81 MG EC tablet, Take 1 tablet by mouth Daily. (Patient not taking: Reported on 10/6/2023), Disp: , Rfl:     colestipol (COLESTID) 1 g tablet, Take 1 tablet by mouth 2 (Two) Times a Day., Disp: 60 tablet, Rfl: 5    Diclofenac Sodium (VOLTAREN) 1 % gel gel, Apply 4 g topically to the appropriate area as directed 4 (Four)  Times a Day., Disp: , Rfl:          Return in about 6 months (around 5/2/2024).      Part of this note may be an electronic transcription/translation of spoken language to printed text using the Dragon Dictation System.

## 2023-11-06 RX ORDER — ESCITALOPRAM OXALATE 10 MG/1
TABLET ORAL
Qty: 90 TABLET | Refills: 0 | Status: SHIPPED | OUTPATIENT
Start: 2023-11-06

## 2023-11-07 NOTE — TELEPHONE ENCOUNTER
Rx Refill Note  Requested Prescriptions     Pending Prescriptions Disp Refills    dicyclomine (BENTYL) 10 MG capsule [Pharmacy Med Name: Dicyclomine HCl 10 MG Oral Capsule] 90 capsule 0     Sig: TAKE 1 CAPSULE BY MOUTH THREE TIMES DAILY AS NEEDED FOR  DIARRHEA  OR  ABDOMINAL  PAIN      Last office visit with prescribing clinician: 10/6/2023   Last telemedicine visit with prescribing clinician: Visit date not found   Next office visit with prescribing clinician: Visit date not found                         Would you like a call back once the refill request has been completed: [] Yes [] No    If the office needs to give you a call back, can they leave a voicemail: [] Yes [] No    Josh Hernandez MA  11/07/23, 13:20 EST

## 2023-11-08 ENCOUNTER — TELEMEDICINE (OUTPATIENT)
Dept: FAMILY MEDICINE CLINIC | Facility: CLINIC | Age: 73
End: 2023-11-08
Payer: MEDICARE

## 2023-11-08 DIAGNOSIS — J06.9 VIRAL UPPER RESPIRATORY ILLNESS: Primary | ICD-10-CM

## 2023-11-08 PROCEDURE — 1159F MED LIST DOCD IN RCRD: CPT | Performed by: INTERNAL MEDICINE

## 2023-11-08 PROCEDURE — 1160F RVW MEDS BY RX/DR IN RCRD: CPT | Performed by: INTERNAL MEDICINE

## 2023-11-08 PROCEDURE — 99214 OFFICE O/P EST MOD 30 MIN: CPT | Performed by: INTERNAL MEDICINE

## 2023-11-08 PROCEDURE — 3051F HG A1C>EQUAL 7.0%<8.0%: CPT | Performed by: INTERNAL MEDICINE

## 2023-11-08 RX ORDER — GUAIFENESIN 600 MG/1
600 TABLET, EXTENDED RELEASE ORAL 2 TIMES DAILY PRN
Qty: 14 TABLET | Refills: 0 | Status: SHIPPED | OUTPATIENT
Start: 2023-11-08 | End: 2023-11-15

## 2023-11-08 RX ORDER — DICYCLOMINE HYDROCHLORIDE 10 MG/1
CAPSULE ORAL
Qty: 90 CAPSULE | Refills: 0 | Status: SHIPPED | OUTPATIENT
Start: 2023-11-08

## 2023-11-08 RX ORDER — FLUTICASONE PROPIONATE 50 MCG
2 SPRAY, SUSPENSION (ML) NASAL DAILY
Qty: 9.9 ML | Refills: 0 | Status: SHIPPED | OUTPATIENT
Start: 2023-11-08

## 2023-11-08 NOTE — LETTER
November 8, 2023     Patient: Pearl Christiansen   YOB: 1950   Date of Visit: 11/8/2023       To Whom It May Concern:    It is my medical opinion that Pearl Christiansen should remain out of work until Friday November 10, or until her symptoms have improved. She should be excused from work on 11/8 and 11/19 .           Sincerely,        Meryl Riley MD    CC:   No Recipients

## 2023-11-08 NOTE — PROGRESS NOTES
Mode of Visit: Video  Location of patient: home  Location of provider: [facility] [place] [facility]  You have chosen to receive care through a telehealth visit.  The patient has signed the video visit consent form.  The visit included audio and video interaction. No technical issues occurred during this visit     Subjective       Chief Complaint   Patient presents with    Cough     Head hurts and fever since Sunday          HPI:        Ramiro is a 73 y.o. female who presents to [facility] today   Pt states her head is full, head hurts when she coughs, some facial pain  Feels like her congestion just won't break up  Temp up to 100 off and on since Sunday  Mild diarrhea  No wheezing, shortness of breath   also sick, tested neg for COVID and flu  Ears/neck glands bothering her  Has been taking APAP and cough syrup from prior illness, mucinex cold and flu  Unsure how BGs are running    Today requests something to loosen up phlegm and clear her head  Needs a work note          PE:   Objective   There were no vitals filed for this visit.     There is no height or weight on file to calculate BMI.    Physical Exam   Constitutional: She appears well-developed and well-nourished. No distress.   HENT:   Head: Normocephalic and atraumatic.   Pulmonary/Chest: Effort normal.  No respiratory distress.  Neurological: She is alert.   Psychiatric: She has a normal mood and affect.                 XR Foot 3+ View Bilateral    Result Date: 10/11/2023  Impression: Ordering physician's impression is located in the Encounter Note dated 10/11/23. X-ray performed in the DR room.             A/P:     Assessment & Plan   Diagnoses and all orders for this visit:    1. Viral upper respiratory illness (Primary)  -     guaiFENesin (Mucinex) 600 MG 12 hr tablet; Take 1 tablet by mouth 2 (Two) Times a Day As Needed for Cough or Congestion for up to 7 days.  Dispense: 14 tablet; Refill: 0  -     fluticasone (FLONASE) 50 MCG/ACT  nasal spray; 2 sprays into the nostril(s) as directed by provider Daily.  Dispense: 9.9 mL; Refill: 0    Discussed that this illness is most likely a viral URI and that antibiotics are not recommended at this time.   I recommend supportive care with rest, hydration (tea with honey is great), prn analgesics, prn antitussives/decongestant (has rx from Dr. Estes from prior illness for cough med), saline nasal spray, Flonase, and tincture of time.  Patient is to notify us if worsening and/or non-resolving symptoms, especially at day 7-10 of illness. She will need an in-person appt at that point.  Discussed limitations of video visit and lack of ability for POC testing and physical exam  Work note provided        Follow up:   Return if symptoms worsen or fail to improve.  Patient was given instructions and counseling regarding her condition or for health maintenance advice. Please see specific information pulled into the AVS if appropriate.

## 2023-11-08 NOTE — PATIENT INSTRUCTIONS
I recommend rest, hydration, as needed pain relievers (acetaminophen), as needed cough medication/decongestants, Fluticasone, saline nasal washes 4 times per day, and the tincture of time. I do not recommend antibiotics at this time.  Post-viral cough can last up to 6-8 weeks at times.  You can take up to 3000 mg acetaminophen per day  Please notify a healthcare provider if worsening symptoms (especially shortness of breath) and/or persistent symptoms at day 7-10 of illness. I recommend an in-person evaluation if you are not improving.  I hope you feel better soon!

## 2023-11-10 RX ORDER — SITAGLIPTIN 50 MG/1
TABLET, FILM COATED ORAL
Qty: 90 TABLET | Refills: 0 | Status: SHIPPED | OUTPATIENT
Start: 2023-11-10

## 2023-11-10 NOTE — TELEPHONE ENCOUNTER
Pt is calling to inform Dr. Puente she is still coughing hard and has fever. Pt also is experiencing diarrhea. Pt was advised to update provider if she was not feeling better by today.     Please advise

## 2023-11-10 NOTE — TELEPHONE ENCOUNTER
Rx Refill Note  Requested Prescriptions     Pending Prescriptions Disp Refills    Januvia 50 MG tablet [Pharmacy Med Name: Januvia 50 MG Oral Tablet] 90 tablet 0     Sig: Take 1 tablet by mouth once daily      Last office visit with prescribing clinician: 10/6/2023   Last telemedicine visit with prescribing clinician: 11/8/2023   Next office visit with prescribing clinician: Visit date not found                         Would you like a call back once the refill request has been completed: [] Yes [] No    If the office needs to give you a call back, can they leave a voicemail: [] Yes [] No    Josh Hernandez MA  11/10/23, 11:40 EST

## 2023-11-27 NOTE — PROGRESS NOTES
REASON FOR CONSULTATION: Anemia  Provide an opinion on any further workup or treatment                             REQUESTING PHYSICIAN: Jesi Estes MD    RECORDS OBTAINED:  Records of the patients history including those obtained from the referring provider were reviewed and summarized in detail.    HISTORY OF PRESENT ILLNESS:  The patient is a 73 y.o. year old female who is here for an opinion about the above issue.    History of Present Illness   The patient is 73-year-old female followed with below medical history and seen by subspecialists including GI 3/23/2023 for GE reflux, gastroparesis and IBS as well as previous cholecystectomy with studies at that point including H&H 11.1 and 34.4 with MCV of 80.0 and MCH 25.8.  Her studies 4/3 include a ferritin at 18.4 and iron of 51.  At this point she was followed by cardiology seen 4/27/2023 with a history of nonobstructive coronary artery disease, sleep apnea on CPAP and  peripheral vascular disease with left lower extremity claudication.  She had been admitted to [facility] for epigastric discomfort undergoing nuclear stress test that was significant for possible infarct but no ischemia.  She is thought to have symptoms from GI origin and was relatively stable with plans to repeat echocardiogram.  This was obtained 5/10/2023 with LV SF of 57.8%, normal left ventricular cavity size and wall thickness, left ventricular diastolic function with grade 1 impaired relaxation.    She had further assessment 5/1/2023 with H&H 10.7 and 33.9, MCHC 25.4.    Patient was seen by primary care 10/6/2023 with leg cramps that were worsening treated symptomatically with electrolyte supplementation, PAD had improvement graded exercise with the patient unable to tolerate Pletal.  She was reviewed by orthopedics 10/12/2023 for her worsening foot pain and thought possibly to have an occult fracture or stress fracture.  Plain films demonstrated degenerative changes with  calcaneal spurs but no acute fracture.    Repeat studies 10/20/2023 with H&H of 10.8 and 33.9, MCH of 25.5, MCV 80.1, platelet count 247,000, free T41.01, TSH 1.680.  Her further cardiac exams were unremarkable 11/2/2023 of the patient continued to have fatigue and had respiratory issues treated by primary care 11/8/2023 with Mucinex and Flonase, subsequent reevaluation 11/13 by immediate care treated with prednisone and Zithromax.    We are asked to see the patient for her anemia and she is seen in office 11/28/2023.  We discussed her findings today in particular her variable anemia which is improved in office today.        Past Medical History:   Diagnosis Date    Allergic     Anxiety     Arthritis     Arthritis of back     Arthritis of neck     CAD (coronary artery disease)     50% mid LAD by cath on 8/14/2017    Depression     Diabetes mellitus     Diverticulosis     Fatty liver 2018    GERD (gastroesophageal reflux disease)     H/O Anal fissure     Hip arthrosis     Hyperlipidemia     Hypertension     Hypothyroidism     Irritable bowel syndrome     Knee swelling     Low back pain     Low back strain     Obesity     ALEXANDER (obstructive sleep apnea)     PAD (peripheral artery disease)     Stress fracture         Past Surgical History:   Procedure Laterality Date    ADENOIDECTOMY      CARDIAC CATHETERIZATION Left 08/14/2017    [facility]    CHOLECYSTECTOMY      COLONOSCOPY  2015    Diverticulosis, hemorrhoids    COLONOSCOPY W/ POLYPECTOMY  2020    ENDOSCOPY  2016    FOOT SURGERY      GALLBLADDER SURGERY      HAND SURGERY      RECTOVAGINAL FISTULA REPAIR      STEROID INJECTION HIP Left 11/21/2022    Procedure: HIP INJECTION UNDER FLUORO - left;  Surgeon: Rhona Hammer MD;  [place] [facility] [place] [facility];  Service: Pain Management;  Laterality: Left;    TONSILLECTOMY      TRIGGER FINGER RELEASE      TRIGGER POINT INJECTION      VULVA BIOPSY      11/2021 Colonoscopy by Dr Hurley- rectal polyp    Current Outpatient  Medications on File Prior to Visit   Medication Sig Dispense Refill    Acetaminophen (TYLENOL PO) Take 650 mg by mouth 2 (Two) Times a Day.      amLODIPine (NORVASC) 5 MG tablet Take 1 tablet by mouth once daily 90 tablet 0    aspirin 81 MG EC tablet Take 1 tablet by mouth Daily.      atorvastatin (LIPITOR) 40 MG tablet Take 1 tablet by mouth Every Night. 90 tablet 3    carvedilol (COREG) 3.125 MG tablet Take 1 tablet by mouth 2 (Two) Times a Day. 60 tablet 11    cetirizine (zyrTEC) 10 MG tablet Take 1 tablet by mouth Daily for 30 days. 30 tablet 0    clobetasol (TEMOVATE) 0.05 % cream Apply 1 application topically to the appropriate area as directed every night at bedtime. 45 g 0    Diclofenac Sodium (VOLTAREN) 1 % gel gel Apply 4 g topically to the appropriate area as directed 4 (Four) Times a Day.      dicyclomine (BENTYL) 10 MG capsule TAKE 1 CAPSULE BY MOUTH THREE TIMES DAILY AS NEEDED FOR  DIARRHEA  OR  ABDOMINAL  PAIN 90 capsule 0    escitalopram (LEXAPRO) 10 MG tablet Take 1 tablet by mouth once daily 90 tablet 0    esomeprazole (nexIUM) 40 MG capsule Take 1 capsule by mouth Every Morning Before Breakfast. 90 capsule 3    fluticasone (FLONASE) 50 MCG/ACT nasal spray 2 sprays into the nostril(s) as directed by provider Daily. 9.9 mL 0    gabapentin (NEURONTIN) 300 MG capsule TAKE 1 CAPSULE BY MOUTH THREE TIMES DAILY 270 capsule 0    Januvia 50 MG tablet Take 1 tablet by mouth once daily 90 tablet 0    levothyroxine (SYNTHROID, LEVOTHROID) 50 MCG tablet Take 1 tablet by mouth once daily 90 tablet 0    Magnesium 100 MG capsule Take  by mouth.      metFORMIN ER (GLUCOPHAGE-XR) 500 MG 24 hr tablet TAKE 1 TABLET BY MOUTH TWICE DAILY BEFORE MEAL(S) 180 tablet 0    methocarbamol (ROBAXIN) 500 MG tablet Take 1 tablet by mouth 3 (Three) Times a Day As Needed for Muscle Spasms. 90 tablet 0    nystatin (MYCOSTATIN) 680592 UNIT/GM ointment Apply 1 application topically to the appropriate area as directed 2 (Two) Times a  Day. 30 g 0    Probiotic Product (ALIGN PO) Take  by mouth.      azithromycin (ZITHROMAX) 250 MG tablet Take as instructed 6 tablet 0    benzonatate (TESSALON) 100 MG capsule Take 1 capsule by mouth 3 (Three) Times a Day As Needed for Cough.      colestipol (COLESTID) 1 g tablet Take 1 tablet by mouth 2 (Two) Times a Day. 60 tablet 5    saccharomyces boulardii (FLORASTOR) 250 MG capsule Take 1 capsule by mouth 2 (Two) Times a Day. (Patient not taking: Reported on 2023)       No current facility-administered medications on file prior to visit.        ALLERGIES:    Allergies   Allergen Reactions    Lidocaine Other (See Comments)     Does not work on pt    Keflex [Cephalexin] Rash    Penicillins Rash        Social History     Socioeconomic History    Marital status:      Spouse name: Haresh   Tobacco Use    Smoking status: Former     Packs/day: 1.00     Years: 30.00     Additional pack years: 0.00     Total pack years: 30.00     Types: Cigarettes     Start date: 1965     Quit date: 1991     Years since quittin.5    Smokeless tobacco: Never    Tobacco comments:     quit in her 40's   Vaping Use    Vaping Use: Never used   Substance and Sexual Activity    Alcohol use: Not Currently     Comment: 1 drink every blue moon    Drug use: Never    Sexual activity: Not Currently     Partners: Male     Birth control/protection: None        Family History   Problem Relation Age of Onset    Hypertension Mother     COPD Mother     Alcohol abuse Brother     Colon cancer Neg Hx     Colon polyps Neg Hx     Crohn's disease Neg Hx     Irritable bowel syndrome Neg Hx     Ulcerative colitis Neg Hx         Review of Systems   Constitutional:  Positive for diaphoresis and fatigue. Negative for fever and unexpected weight change.   HENT:  Positive for mouth sores (Dry mouth).    Eyes: Negative.    Respiratory:  Positive for cough and shortness of breath.         Recent URI   Cardiovascular:  Positive for palpitations  "(Recent holter).   Gastrointestinal:  Positive for abdominal pain and diarrhea.        Fatty food intolrance   Endocrine: Negative.    Genitourinary:  Positive for frequency.   Musculoskeletal:  Positive for myalgias (night cramps).   Neurological: Negative.    Hematological: Negative.    Psychiatric/Behavioral: Negative.          Objective     Vitals:    11/28/23 1116   BP: 137/80   Pulse: 74   Resp: 16   Temp: 97.3 °F (36.3 °C)   TempSrc: Temporal   SpO2: 94%   Weight: 80 kg (176 lb 4.8 oz)   Height: 157 cm (61.81\")   PainSc:   8   PainLoc: Neck         11/28/2023    11:18 AM   Current Status   ECOG score 0       Physical Exam  Constitutional:       Appearance: She is obese.   HENT:      Head: Normocephalic and atraumatic.      Nose: Nose normal.      Mouth/Throat:      Mouth: Mucous membranes are moist.      Pharynx: Oropharynx is clear.   Eyes:      Extraocular Movements: Extraocular movements intact.      Conjunctiva/sclera: Conjunctivae normal.      Pupils: Pupils are equal, round, and reactive to light.   Cardiovascular:      Rate and Rhythm: Normal rate and regular rhythm.      Pulses: Normal pulses.      Heart sounds: Normal heart sounds.   Pulmonary:      Effort: Pulmonary effort is normal.      Breath sounds: Normal breath sounds.   Abdominal:      General: Bowel sounds are normal.      Palpations: Abdomen is soft.   Musculoskeletal:         General: Normal range of motion.      Cervical back: Normal range of motion and neck supple.   Skin:     General: Skin is warm and dry.   Neurological:      General: No focal deficit present.      Mental Status: She is oriented to person, place, and time.   Psychiatric:         Mood and Affect: Mood normal.         Behavior: Behavior normal.           RECENT LABS:  Hematology WBC   Date Value Ref Range Status   11/28/2023 9.89 3.40 - 10.80 10*3/mm3 Final   10/20/2023 7.48 3.40 - 10.80 10*3/mm3 Final   05/01/2023 7.49 4.5 - 11.0 10*3/uL Final     RBC   Date Value Ref " Range Status   11/28/2023 4.48 3.77 - 5.28 10*6/mm3 Final   10/20/2023 4.23 3.77 - 5.28 10*6/mm3 Final   05/01/2023 4.22 4.0 - 5.2 10*6/uL Final     Hemoglobin   Date Value Ref Range Status   11/28/2023 11.3 (L) 12.0 - 15.9 g/dL Final   05/01/2023 10.7 (L) 12.0 - 16.0 g/dL Final     Hematocrit   Date Value Ref Range Status   11/28/2023 35.9 34.0 - 46.6 % Final   05/01/2023 33.9 (L) 36.0 - 46.0 % Final     Platelets   Date Value Ref Range Status   11/28/2023 249 140 - 450 10*3/mm3 Final   05/01/2023 233 140 - 440 10*3/uL Final          Assessment & Plan     73-year-old female with a history including GE reflux, gastroparesis, IBS, previous cholecystectomy, nonobstructive coronary artery disease, peripheral vascular disease, worsening leg cramps found to have variable anemia over the last several visits.  On occasion this has been thought to be iron deficient and she has been treated with oral iron on previous occasions.  Peripheral smear in office today reveals normocytic normochromic RBCs with mild polychromatophilia, microcytosis and hypochromia.  Leukocytes appear normal per number differential, platelets appear normal per number and size.    The patient's anemia is not progressive, and is suspected to be related to her chronic illnesses, but we do need to assess to try to determine whether we could improve this for her.    After discussion she will be sent back to laboratory obtaining CMP, EPO level, MATHEUS, PE, free serum light chains, MMA, sed rate, B12 level, MATHEUS, PE, free serum light chains.  She had been in her primary care's office today undergoing TSH, free T4, hemoglobin A1c, ferritin, iron profile as well.      Patient is requested to be seen back in the next 2 to 3 weeks as this results become available.    Patient agreeable this plan and follow-up.

## 2023-11-28 ENCOUNTER — LAB (OUTPATIENT)
Dept: OTHER | Facility: HOSPITAL | Age: 73
End: 2023-11-28
Payer: MEDICARE

## 2023-11-28 ENCOUNTER — CONSULT (OUTPATIENT)
Dept: ONCOLOGY | Facility: CLINIC | Age: 73
End: 2023-11-28
Payer: MEDICARE

## 2023-11-28 VITALS
OXYGEN SATURATION: 94 % | WEIGHT: 176.3 LBS | DIASTOLIC BLOOD PRESSURE: 80 MMHG | HEIGHT: 62 IN | BODY MASS INDEX: 32.44 KG/M2 | TEMPERATURE: 97.3 F | RESPIRATION RATE: 16 BRPM | SYSTOLIC BLOOD PRESSURE: 137 MMHG | HEART RATE: 74 BPM

## 2023-11-28 DIAGNOSIS — D64.9 ANEMIA, UNSPECIFIED TYPE: Primary | ICD-10-CM

## 2023-11-28 LAB
ALBUMIN SERPL-MCNC: 4.3 G/DL (ref 3.5–5.2)
ALBUMIN/GLOB SERPL: 1.3 G/DL
ALP SERPL-CCNC: 78 U/L (ref 39–117)
ALT SERPL W P-5'-P-CCNC: 11 U/L (ref 1–33)
ANION GAP SERPL CALCULATED.3IONS-SCNC: 10.1 MMOL/L (ref 5–15)
AST SERPL-CCNC: 15 U/L (ref 1–32)
BASOPHILS # BLD AUTO: 0.06 10*3/MM3 (ref 0–0.2)
BASOPHILS NFR BLD AUTO: 0.6 % (ref 0–1.5)
BILIRUB SERPL-MCNC: 0.3 MG/DL (ref 0–1.2)
BUN SERPL-MCNC: 14 MG/DL (ref 8–23)
BUN/CREAT SERPL: 15.1 (ref 7–25)
CALCIUM SPEC-SCNC: 9.4 MG/DL (ref 8.6–10.5)
CHLORIDE SERPL-SCNC: 103 MMOL/L (ref 98–107)
CO2 SERPL-SCNC: 26.9 MMOL/L (ref 22–29)
CREAT SERPL-MCNC: 0.93 MG/DL (ref 0.57–1)
DEPRECATED RDW RBC AUTO: 43.7 FL (ref 37–54)
EGFRCR SERPLBLD CKD-EPI 2021: 65.8 ML/MIN/1.73
EOSINOPHIL # BLD AUTO: 0.3 10*3/MM3 (ref 0–0.4)
EOSINOPHIL NFR BLD AUTO: 3 % (ref 0.3–6.2)
ERYTHROCYTE [DISTWIDTH] IN BLOOD BY AUTOMATED COUNT: 15.1 % (ref 12.3–15.4)
ERYTHROCYTE [SEDIMENTATION RATE] IN BLOOD: 10 MM/HR (ref 0–30)
GLOBULIN UR ELPH-MCNC: 3.3 GM/DL
GLUCOSE SERPL-MCNC: 144 MG/DL (ref 65–99)
HCT VFR BLD AUTO: 35.9 % (ref 34–46.6)
HGB BLD-MCNC: 11.3 G/DL (ref 12–15.9)
HGB RETIC QN AUTO: 31.2 PG (ref 29.8–36.1)
IMM GRANULOCYTES # BLD AUTO: 0.07 10*3/MM3 (ref 0–0.05)
IMM GRANULOCYTES NFR BLD AUTO: 0.7 % (ref 0–0.5)
IMM RETICS NFR: 19.8 % (ref 3–15.8)
LYMPHOCYTES # BLD AUTO: 1.98 10*3/MM3 (ref 0.7–3.1)
LYMPHOCYTES NFR BLD AUTO: 20 % (ref 19.6–45.3)
MCH RBC QN AUTO: 25.2 PG (ref 26.6–33)
MCHC RBC AUTO-ENTMCNC: 31.5 G/DL (ref 31.5–35.7)
MCV RBC AUTO: 80.1 FL (ref 79–97)
MONOCYTES # BLD AUTO: 0.75 10*3/MM3 (ref 0.1–0.9)
MONOCYTES NFR BLD AUTO: 7.6 % (ref 5–12)
NEUTROPHILS NFR BLD AUTO: 6.73 10*3/MM3 (ref 1.7–7)
NEUTROPHILS NFR BLD AUTO: 68.1 % (ref 42.7–76)
NRBC BLD AUTO-RTO: 0 /100 WBC (ref 0–0.2)
PLATELET # BLD AUTO: 249 10*3/MM3 (ref 140–450)
PMV BLD AUTO: 11.3 FL (ref 6–12)
POTASSIUM SERPL-SCNC: 4.2 MMOL/L (ref 3.5–5.2)
PROT SERPL-MCNC: 7.6 G/DL (ref 6–8.5)
RBC # BLD AUTO: 4.48 10*6/MM3 (ref 3.77–5.28)
RETICS # AUTO: 0.07 10*6/MM3 (ref 0.02–0.13)
RETICS/RBC NFR AUTO: 1.48 % (ref 0.7–1.9)
SODIUM SERPL-SCNC: 140 MMOL/L (ref 136–145)
VIT B12 BLD-MCNC: 551 PG/ML (ref 211–946)
WBC NRBC COR # BLD AUTO: 9.89 10*3/MM3 (ref 3.4–10.8)

## 2023-11-28 PROCEDURE — 82784 ASSAY IGA/IGD/IGG/IGM EACH: CPT | Performed by: INTERNAL MEDICINE

## 2023-11-28 PROCEDURE — 85025 COMPLETE CBC W/AUTO DIFF WBC: CPT | Performed by: INTERNAL MEDICINE

## 2023-11-28 PROCEDURE — 83521 IG LIGHT CHAINS FREE EACH: CPT | Performed by: INTERNAL MEDICINE

## 2023-11-28 PROCEDURE — 86235 NUCLEAR ANTIGEN ANTIBODY: CPT | Performed by: INTERNAL MEDICINE

## 2023-11-28 PROCEDURE — 85046 RETICYTE/HGB CONCENTRATE: CPT | Performed by: INTERNAL MEDICINE

## 2023-11-28 PROCEDURE — 36415 COLL VENOUS BLD VENIPUNCTURE: CPT

## 2023-11-28 PROCEDURE — 83921 ORGANIC ACID SINGLE QUANT: CPT | Performed by: INTERNAL MEDICINE

## 2023-11-28 PROCEDURE — 82607 VITAMIN B-12: CPT | Performed by: INTERNAL MEDICINE

## 2023-11-28 PROCEDURE — 86334 IMMUNOFIX E-PHORESIS SERUM: CPT | Performed by: INTERNAL MEDICINE

## 2023-11-28 PROCEDURE — 82668 ASSAY OF ERYTHROPOIETIN: CPT | Performed by: INTERNAL MEDICINE

## 2023-11-28 PROCEDURE — 1125F AMNT PAIN NOTED PAIN PRSNT: CPT | Performed by: INTERNAL MEDICINE

## 2023-11-28 PROCEDURE — 99204 OFFICE O/P NEW MOD 45 MIN: CPT | Performed by: INTERNAL MEDICINE

## 2023-11-28 PROCEDURE — 86225 DNA ANTIBODY NATIVE: CPT | Performed by: INTERNAL MEDICINE

## 2023-11-28 PROCEDURE — 84165 PROTEIN E-PHORESIS SERUM: CPT | Performed by: INTERNAL MEDICINE

## 2023-11-28 PROCEDURE — 85652 RBC SED RATE AUTOMATED: CPT | Performed by: INTERNAL MEDICINE

## 2023-11-28 PROCEDURE — 80053 COMPREHEN METABOLIC PANEL: CPT | Performed by: INTERNAL MEDICINE

## 2023-11-28 NOTE — LETTER
November 28, 2023     Jesi Estes MD  [address]  [address]  [facility] [address]    Patient: Pearl Christiansen   YOB: 1950   Date of Visit: 11/28/2023     Dear Jesi Estes MD:       Thank you for referring Pearl Christiansen to me for evaluation. Below are the relevant portions of my assessment and plan of care.    If you have questions, please do not hesitate to call me. I look forward to following Pearl along with you.         Sincerely,        Anton Saleh MD        CC: No Recipients    Anton Saleh MD  11/28/23 1314  Sign when Signing Visit        REASON FOR CONSULTATION: Anemia  Provide an opinion on any further workup or treatment                             REQUESTING PHYSICIAN: Jesi Estes MD    RECORDS OBTAINED:  Records of the patients history including those obtained from the referring provider were reviewed and summarized in detail.    HISTORY OF PRESENT ILLNESS:  The patient is a 73 y.o. year old female who is here for an opinion about the above issue.    History of Present Illness   The patient is 73-year-old female followed with below medical history and seen by subspecialists including GI 3/23/2023 for GE reflux, gastroparesis and IBS as well as previous cholecystectomy with studies at that point including H&H 11.1 and 34.4 with MCV of 80.0 and MCH 25.8.  Her studies 4/3 include a ferritin at 18.4 and iron of 51.  At this point she was followed by cardiology seen 4/27/2023 with a history of nonobstructive coronary artery disease, sleep apnea on CPAP and  peripheral vascular disease with left lower extremity claudication.  She had been admitted to [facility] for epigastric discomfort undergoing nuclear stress test that was significant for possible infarct but no ischemia.  She is thought to have symptoms from GI origin and was relatively stable with plans to repeat echocardiogram.  This was obtained 5/10/2023 with LV SF of 57.8%, normal left ventricular  cavity size and wall thickness, left ventricular diastolic function with grade 1 impaired relaxation.    She had further assessment 5/1/2023 with H&H 10.7 and 33.9, MCHC 25.4.    Patient was seen by primary care 10/6/2023 with leg cramps that were worsening treated symptomatically with electrolyte supplementation, PAD had improvement graded exercise with the patient unable to tolerate Pletal.  She was reviewed by orthopedics 10/12/2023 for her worsening foot pain and thought possibly to have an occult fracture or stress fracture.  Plain films demonstrated degenerative changes with calcaneal spurs but no acute fracture.    Repeat studies 10/20/2023 with H&H of 10.8 and 33.9, MCH of 25.5, MCV 80.1, platelet count 247,000, free T41.01, TSH 1.680.  Her further cardiac exams were unremarkable 11/2/2023 of the patient continued to have fatigue and had respiratory issues treated by primary care 11/8/2023 with Mucinex and Flonase, subsequent reevaluation 11/13 by immediate care treated with prednisone and Zithromax.    We are asked to see the patient for her anemia and she is seen in office 11/28/2023.  We discussed her findings today in particular her variable anemia which is improved in office today.        Past Medical History:   Diagnosis Date   • Allergic    • Anxiety    • Arthritis    • Arthritis of back    • Arthritis of neck    • CAD (coronary artery disease)     50% mid LAD by cath on 8/14/2017   • Depression    • Diabetes mellitus    • Diverticulosis    • Fatty liver 2018   • GERD (gastroesophageal reflux disease)    • H/O Anal fissure    • Hip arthrosis    • Hyperlipidemia    • Hypertension    • Hypothyroidism    • Irritable bowel syndrome    • Knee swelling    • Low back pain    • Low back strain    • Obesity    • ALEXANDER (obstructive sleep apnea)    • PAD (peripheral artery disease)    • Stress fracture         Past Surgical History:   Procedure Laterality Date   • ADENOIDECTOMY     • CARDIAC CATHETERIZATION Left  08/14/2017    [facility]   • CHOLECYSTECTOMY     • COLONOSCOPY  2015    Diverticulosis, hemorrhoids   • COLONOSCOPY W/ POLYPECTOMY  2020   • ENDOSCOPY  2016   • FOOT SURGERY     • GALLBLADDER SURGERY     • HAND SURGERY     • RECTOVAGINAL FISTULA REPAIR     • STEROID INJECTION HIP Left 11/21/2022    Procedure: HIP INJECTION UNDER FLUORO - left;  Surgeon: Rhona Hammer MD;  Location: [facility] MAIN OR;  Service: Pain Management;  Laterality: Left;   • TONSILLECTOMY     • TRIGGER FINGER RELEASE     • TRIGGER POINT INJECTION     • VULVA BIOPSY      11/2021 Colonoscopy by Dr Hurley- rectal polyp    Current Outpatient Medications on File Prior to Visit   Medication Sig Dispense Refill   • Acetaminophen (TYLENOL PO) Take 650 mg by mouth 2 (Two) Times a Day.     • amLODIPine (NORVASC) 5 MG tablet Take 1 tablet by mouth once daily 90 tablet 0   • aspirin 81 MG EC tablet Take 1 tablet by mouth Daily.     • atorvastatin (LIPITOR) 40 MG tablet Take 1 tablet by mouth Every Night. 90 tablet 3   • carvedilol (COREG) 3.125 MG tablet Take 1 tablet by mouth 2 (Two) Times a Day. 60 tablet 11   • cetirizine (zyrTEC) 10 MG tablet Take 1 tablet by mouth Daily for 30 days. 30 tablet 0   • clobetasol (TEMOVATE) 0.05 % cream Apply 1 application topically to the appropriate area as directed every night at bedtime. 45 g 0   • Diclofenac Sodium (VOLTAREN) 1 % gel gel Apply 4 g topically to the appropriate area as directed 4 (Four) Times a Day.     • dicyclomine (BENTYL) 10 MG capsule TAKE 1 CAPSULE BY MOUTH THREE TIMES DAILY AS NEEDED FOR  DIARRHEA  OR  ABDOMINAL  PAIN 90 capsule 0   • escitalopram (LEXAPRO) 10 MG tablet Take 1 tablet by mouth once daily 90 tablet 0   • esomeprazole (nexIUM) 40 MG capsule Take 1 capsule by mouth Every Morning Before Breakfast. 90 capsule 3   • fluticasone (FLONASE) 50 MCG/ACT nasal spray 2 sprays into the nostril(s) as directed by provider Daily. 9.9 mL 0   • gabapentin (NEURONTIN) 300 MG capsule TAKE 1 CAPSULE  BY MOUTH THREE TIMES DAILY 270 capsule 0   • Januvia 50 MG tablet Take 1 tablet by mouth once daily 90 tablet 0   • levothyroxine (SYNTHROID, LEVOTHROID) 50 MCG tablet Take 1 tablet by mouth once daily 90 tablet 0   • Magnesium 100 MG capsule Take  by mouth.     • metFORMIN ER (GLUCOPHAGE-XR) 500 MG 24 hr tablet TAKE 1 TABLET BY MOUTH TWICE DAILY BEFORE MEAL(S) 180 tablet 0   • methocarbamol (ROBAXIN) 500 MG tablet Take 1 tablet by mouth 3 (Three) Times a Day As Needed for Muscle Spasms. 90 tablet 0   • nystatin (MYCOSTATIN) 373405 UNIT/GM ointment Apply 1 application topically to the appropriate area as directed 2 (Two) Times a Day. 30 g 0   • Probiotic Product (ALIGN PO) Take  by mouth.     • azithromycin (ZITHROMAX) 250 MG tablet Take as instructed 6 tablet 0   • benzonatate (TESSALON) 100 MG capsule Take 1 capsule by mouth 3 (Three) Times a Day As Needed for Cough.     • colestipol (COLESTID) 1 g tablet Take 1 tablet by mouth 2 (Two) Times a Day. 60 tablet 5   • saccharomyces boulardii (FLORASTOR) 250 MG capsule Take 1 capsule by mouth 2 (Two) Times a Day. (Patient not taking: Reported on 2023)       No current facility-administered medications on file prior to visit.        ALLERGIES:    Allergies   Allergen Reactions   • Lidocaine Other (See Comments)     Does not work on pt   • Keflex [Cephalexin] Rash   • Penicillins Rash        Social History     Socioeconomic History   • Marital status:      Spouse name: Haresh   Tobacco Use   • Smoking status: Former     Packs/day: 1.00     Years: 30.00     Additional pack years: 0.00     Total pack years: 30.00     Types: Cigarettes     Start date: 1965     Quit date: 1991     Years since quittin.5   • Smokeless tobacco: Never   • Tobacco comments:     quit in her 40's   Vaping Use   • Vaping Use: Never used   Substance and Sexual Activity   • Alcohol use: Not Currently     Comment: 1 drink every blue moon   • Drug use: Never   • Sexual activity:  "Not Currently     Partners: Male     Birth control/protection: None        Family History   Problem Relation Age of Onset   • Hypertension Mother    • COPD Mother    • Alcohol abuse Brother    • Colon cancer Neg Hx    • Colon polyps Neg Hx    • Crohn's disease Neg Hx    • Irritable bowel syndrome Neg Hx    • Ulcerative colitis Neg Hx         Review of Systems   Constitutional:  Positive for diaphoresis and fatigue. Negative for fever and unexpected weight change.   HENT:  Positive for mouth sores (Dry mouth).    Eyes: Negative.    Respiratory:  Positive for cough and shortness of breath.         Recent URI   Cardiovascular:  Positive for palpitations (Recent holter).   Gastrointestinal:  Positive for abdominal pain and diarrhea.        Fatty food intolrance   Endocrine: Negative.    Genitourinary:  Positive for frequency.   Musculoskeletal:  Positive for myalgias (night cramps).   Neurological: Negative.    Hematological: Negative.    Psychiatric/Behavioral: Negative.          Objective    Vitals:    11/28/23 1116   BP: 137/80   Pulse: 74   Resp: 16   Temp: 97.3 °F (36.3 °C)   TempSrc: Temporal   SpO2: 94%   Weight: 80 kg (176 lb 4.8 oz)   Height: 157 cm (61.81\")   PainSc:   8   PainLoc: Neck         11/28/2023    11:18 AM   Current Status   ECOG score 0       Physical Exam  Constitutional:       Appearance: She is obese.   HENT:      Head: Normocephalic and atraumatic.      Nose: Nose normal.      Mouth/Throat:      Mouth: Mucous membranes are moist.      Pharynx: Oropharynx is clear.   Eyes:      Extraocular Movements: Extraocular movements intact.      Conjunctiva/sclera: Conjunctivae normal.      Pupils: Pupils are equal, round, and reactive to light.   Cardiovascular:      Rate and Rhythm: Normal rate and regular rhythm.      Pulses: Normal pulses.      Heart sounds: Normal heart sounds.   Pulmonary:      Effort: Pulmonary effort is normal.      Breath sounds: Normal breath sounds.   Abdominal:      General: " Bowel sounds are normal.      Palpations: Abdomen is soft.   Musculoskeletal:         General: Normal range of motion.      Cervical back: Normal range of motion and neck supple.   Skin:     General: Skin is warm and dry.   Neurological:      General: No focal deficit present.      Mental Status: She is oriented to person, place, and time.   Psychiatric:         Mood and Affect: Mood normal.         Behavior: Behavior normal.           RECENT LABS:  Hematology WBC   Date Value Ref Range Status   11/28/2023 9.89 3.40 - 10.80 10*3/mm3 Final   10/20/2023 7.48 3.40 - 10.80 10*3/mm3 Final   05/01/2023 7.49 4.5 - 11.0 10*3/uL Final     RBC   Date Value Ref Range Status   11/28/2023 4.48 3.77 - 5.28 10*6/mm3 Final   10/20/2023 4.23 3.77 - 5.28 10*6/mm3 Final   05/01/2023 4.22 4.0 - 5.2 10*6/uL Final     Hemoglobin   Date Value Ref Range Status   11/28/2023 11.3 (L) 12.0 - 15.9 g/dL Final   05/01/2023 10.7 (L) 12.0 - 16.0 g/dL Final     Hematocrit   Date Value Ref Range Status   11/28/2023 35.9 34.0 - 46.6 % Final   05/01/2023 33.9 (L) 36.0 - 46.0 % Final     Platelets   Date Value Ref Range Status   11/28/2023 249 140 - 450 10*3/mm3 Final   05/01/2023 233 140 - 440 10*3/uL Final          Assessment & Plan    73-year-old female with a history including GE reflux, gastroparesis, IBS, previous cholecystectomy, nonobstructive coronary artery disease, peripheral vascular disease, worsening leg cramps found to have variable anemia over the last several visits.  On occasion this has been thought to be iron deficient and she has been treated with oral iron on previous occasions.  Peripheral smear in office today reveals normocytic normochromic RBCs with mild polychromatophilia, microcytosis and hypochromia.  Leukocytes appear normal per number differential, platelets appear normal per number and size.    The patient's anemia is not progressive, and is suspected to be related to her chronic illnesses, but we do need to assess to  try to determine whether we could improve this for her.    After discussion she will be sent back to laboratory obtaining CMP, EPO level, MATHEUS, PE, free serum light chains, MMA, sed rate, B12 level, MATHEUS, PE, free serum light chains.  She had been in her primary care's office today undergoing TSH, free T4, hemoglobin A1c, ferritin, iron profile as well.      Patient is requested to be seen back in the next 2 to 3 weeks as this results become available.    Patient agreeable this plan and follow-up.

## 2023-11-29 ENCOUNTER — PATIENT ROUNDING (BHMG ONLY) (OUTPATIENT)
Dept: ONCOLOGY | Facility: CLINIC | Age: 73
End: 2023-11-29
Payer: MEDICARE

## 2023-11-29 LAB
CENTROMERE B AB SER-ACNC: <0.2 AI (ref 0–0.9)
CHROMATIN AB SERPL-ACNC: <0.2 AI (ref 0–0.9)
DSDNA AB SER-ACNC: <1 IU/ML (ref 0–9)
ENA JO1 AB SER-ACNC: <0.2 AI (ref 0–0.9)
ENA RNP AB SER-ACNC: <0.2 AI (ref 0–0.9)
ENA SCL70 AB SER-ACNC: <0.2 AI (ref 0–0.9)
ENA SM AB SER-ACNC: <0.2 AI (ref 0–0.9)
ENA SS-A AB SER-ACNC: <0.2 AI (ref 0–0.9)
ENA SS-B AB SER-ACNC: <0.2 AI (ref 0–0.9)
EPO SERPL-ACNC: 24.3 MIU/ML (ref 2.6–18.5)
Lab: NORMAL

## 2023-11-30 LAB
ALBUMIN SERPL ELPH-MCNC: 3.3 G/DL (ref 2.9–4.4)
ALBUMIN/GLOB SERPL: 1 {RATIO} (ref 0.7–1.7)
ALPHA1 GLOB SERPL ELPH-MCNC: 0.2 G/DL (ref 0–0.4)
ALPHA2 GLOB SERPL ELPH-MCNC: 1.1 G/DL (ref 0.4–1)
B-GLOBULIN SERPL ELPH-MCNC: 1.2 G/DL (ref 0.7–1.3)
GAMMA GLOB SERPL ELPH-MCNC: 1 G/DL (ref 0.4–1.8)
GLOBULIN SER-MCNC: 3.5 G/DL (ref 2.2–3.9)
IGA SERPL-MCNC: 330 MG/DL (ref 64–422)
IGG SERPL-MCNC: 993 MG/DL (ref 586–1602)
IGM SERPL-MCNC: 58 MG/DL (ref 26–217)
INTERPRETATION SERPL IEP-IMP: ABNORMAL
KAPPA LC FREE SER-MCNC: 22.7 MG/L (ref 3.3–19.4)
KAPPA LC FREE/LAMBDA FREE SER: 1.19 {RATIO} (ref 0.26–1.65)
LABORATORY COMMENT REPORT: ABNORMAL
LAMBDA LC FREE SERPL-MCNC: 19.1 MG/L (ref 5.7–26.3)
M PROTEIN SERPL ELPH-MCNC: ABNORMAL G/DL
PROT SERPL-MCNC: 6.8 G/DL (ref 6–8.5)

## 2023-12-01 ENCOUNTER — TELEPHONE (OUTPATIENT)
Dept: ONCOLOGY | Facility: CLINIC | Age: 73
End: 2023-12-01
Payer: MEDICARE

## 2023-12-01 LAB — METHYLMALONATE SERPL-SCNC: 114 NMOL/L (ref 0–378)

## 2023-12-01 RX ORDER — AMLODIPINE BESYLATE 5 MG/1
TABLET ORAL
Qty: 90 TABLET | Refills: 0 | Status: SHIPPED | OUTPATIENT
Start: 2023-12-01

## 2023-12-04 NOTE — TELEPHONE ENCOUNTER
Rx Refill Note  Requested Prescriptions     Pending Prescriptions Disp Refills    dicyclomine (BENTYL) 10 MG capsule [Pharmacy Med Name: Dicyclomine HCl 10 MG Oral Capsule] 90 capsule 0     Sig: Take 1 capsule by mouth three times daily as needed      Last office visit with prescribing clinician: 10/6/2023   Last telemedicine visit with prescribing clinician: 11/8/2023   Next office visit with prescribing clinician: Visit date not found                         Would you like a call back once the refill request has been completed: [] Yes [] No    If the office needs to give you a call back, can they leave a voicemail: [] Yes [] No    Josh Hernandez MA  12/04/23, 13:32 EST

## 2023-12-05 RX ORDER — DICYCLOMINE HYDROCHLORIDE 10 MG/1
10 CAPSULE ORAL 3 TIMES DAILY PRN
Qty: 90 CAPSULE | Refills: 0 | Status: SHIPPED | OUTPATIENT
Start: 2023-12-05

## 2023-12-26 RX ORDER — METFORMIN HYDROCHLORIDE 500 MG/1
TABLET, EXTENDED RELEASE ORAL
Qty: 180 TABLET | Refills: 0 | Status: SHIPPED | OUTPATIENT
Start: 2023-12-26

## 2024-01-02 NOTE — TELEPHONE ENCOUNTER
Rx Refill Note  Requested Prescriptions     Pending Prescriptions Disp Refills    levothyroxine (SYNTHROID, LEVOTHROID) 50 MCG tablet [Pharmacy Med Name: Levothyroxine Sodium 50 MCG Oral Tablet] 90 tablet 0     Sig: Take 1 tablet by mouth once daily      Last office visit with prescribing clinician: 10/6/2023   Last telemedicine visit with prescribing clinician: 11/8/2023   Next office visit with prescribing clinician: Visit date not found                         Would you like a call back once the refill request has been completed: [] Yes [] No    If the office needs to give you a call back, can they leave a voicemail: [] Yes [] No    Josh Hernandez MA  01/02/24, 08:58 EST

## 2024-01-03 RX ORDER — LEVOTHYROXINE SODIUM 0.05 MG/1
50 TABLET ORAL DAILY
Qty: 90 TABLET | Refills: 0 | Status: SHIPPED | OUTPATIENT
Start: 2024-01-03

## 2024-01-09 ENCOUNTER — LAB (OUTPATIENT)
Dept: OTHER | Facility: HOSPITAL | Age: 74
End: 2024-01-09
Payer: MEDICARE

## 2024-01-09 ENCOUNTER — OFFICE VISIT (OUTPATIENT)
Dept: ONCOLOGY | Facility: CLINIC | Age: 74
End: 2024-01-09
Payer: MEDICARE

## 2024-01-09 VITALS
SYSTOLIC BLOOD PRESSURE: 131 MMHG | OXYGEN SATURATION: 96 % | TEMPERATURE: 97.7 F | HEIGHT: 62 IN | RESPIRATION RATE: 16 BRPM | WEIGHT: 172 LBS | DIASTOLIC BLOOD PRESSURE: 82 MMHG | HEART RATE: 80 BPM | BODY MASS INDEX: 31.65 KG/M2

## 2024-01-09 DIAGNOSIS — D64.9 ANEMIA, UNSPECIFIED TYPE: ICD-10-CM

## 2024-01-09 DIAGNOSIS — D64.9 ANEMIA, UNSPECIFIED TYPE: Primary | ICD-10-CM

## 2024-01-09 LAB
BASOPHILS # BLD AUTO: 0.06 10*3/MM3 (ref 0–0.2)
BASOPHILS NFR BLD AUTO: 0.9 % (ref 0–1.5)
DEPRECATED RDW RBC AUTO: 45.4 FL (ref 37–54)
EOSINOPHIL # BLD AUTO: 0.29 10*3/MM3 (ref 0–0.4)
EOSINOPHIL NFR BLD AUTO: 4.2 % (ref 0.3–6.2)
ERYTHROCYTE [DISTWIDTH] IN BLOOD BY AUTOMATED COUNT: 15.2 % (ref 12.3–15.4)
HCT VFR BLD AUTO: 34.7 % (ref 34–46.6)
HGB BLD-MCNC: 10.9 G/DL (ref 12–15.9)
IMM GRANULOCYTES # BLD AUTO: 0.02 10*3/MM3 (ref 0–0.05)
IMM GRANULOCYTES NFR BLD AUTO: 0.3 % (ref 0–0.5)
LYMPHOCYTES # BLD AUTO: 1.96 10*3/MM3 (ref 0.7–3.1)
LYMPHOCYTES NFR BLD AUTO: 28.2 % (ref 19.6–45.3)
MCH RBC QN AUTO: 25.7 PG (ref 26.6–33)
MCHC RBC AUTO-ENTMCNC: 31.4 G/DL (ref 31.5–35.7)
MCV RBC AUTO: 81.8 FL (ref 79–97)
MONOCYTES # BLD AUTO: 0.45 10*3/MM3 (ref 0.1–0.9)
MONOCYTES NFR BLD AUTO: 6.5 % (ref 5–12)
NEUTROPHILS NFR BLD AUTO: 4.18 10*3/MM3 (ref 1.7–7)
NEUTROPHILS NFR BLD AUTO: 59.9 % (ref 42.7–76)
NRBC BLD AUTO-RTO: 0 /100 WBC (ref 0–0.2)
PLATELET # BLD AUTO: 246 10*3/MM3 (ref 140–450)
PMV BLD AUTO: 10.9 FL (ref 6–12)
RBC # BLD AUTO: 4.24 10*6/MM3 (ref 3.77–5.28)
WBC NRBC COR # BLD AUTO: 6.96 10*3/MM3 (ref 3.4–10.8)

## 2024-01-09 PROCEDURE — 36415 COLL VENOUS BLD VENIPUNCTURE: CPT

## 2024-01-09 PROCEDURE — 1126F AMNT PAIN NOTED NONE PRSNT: CPT | Performed by: INTERNAL MEDICINE

## 2024-01-09 PROCEDURE — 85025 COMPLETE CBC W/AUTO DIFF WBC: CPT | Performed by: INTERNAL MEDICINE

## 2024-01-09 PROCEDURE — 99213 OFFICE O/P EST LOW 20 MIN: CPT | Performed by: INTERNAL MEDICINE

## 2024-01-09 NOTE — LETTER
January 9, 2024     Jesi Estes MD  [address]  [address]  [facility] [address]    Patient: Pearl Christiansen   YOB: 1950   Date of Visit: 1/9/2024     Dear Jesi Estes MD:       Thank you for referring Pearl Christiansen to me for evaluation. Below are the relevant portions of my assessment and plan of care.    If you have questions, please do not hesitate to call me. I look forward to following Pearl along with you.         Sincerely,        Anton Saleh MD        CC: No Recipients    Anton Saleh MD  01/09/24 1626  Sign when Signing Visit        REASON FOR FOLLOW-UP: Anemia    History of Present Illness   The patient is 73-year-old female followed with below medical history and seen by subspecialists including GI 3/23/2023 for GE reflux, gastroparesis and IBS as well as previous cholecystectomy with studies at that point including H&H 11.1 and 34.4 with MCV of 80.0 and MCH 25.8.  Her studies 4/3 include a ferritin at 18.4 and iron of 51.  At this point she was followed by cardiology seen 4/27/2023 with a history of nonobstructive coronary artery disease, sleep apnea on CPAP and  peripheral vascular disease with left lower extremity claudication.  She had been admitted to [facility] for epigastric discomfort undergoing nuclear stress test that was significant for possible infarct but no ischemia.  She is thought to have symptoms from GI origin and was relatively stable with plans to repeat echocardiogram.  This was obtained 5/10/2023 with LV SF of 57.8%, normal left ventricular cavity size and wall thickness, left ventricular diastolic function with grade 1 impaired relaxation.    She had further assessment 5/1/2023 with H&H 10.7 and 33.9, MCHC 25.4.    Patient was seen by primary care 10/6/2023 with leg cramps that were worsening treated symptomatically with electrolyte supplementation, PAD had improvement graded exercise with the patient unable to tolerate Pletal.   She was reviewed by orthopedics 10/12/2023 for her worsening foot pain and thought possibly to have an occult fracture or stress fracture.  Plain films demonstrated degenerative changes with calcaneal spurs but no acute fracture.    Repeat studies 10/20/2023 with H&H of 10.8 and 33.9, MCH of 25.5, MCV 80.1, platelet count 247,000, free T41.01, TSH 1.680.  Her further cardiac exams were unremarkable 11/2/2023 of the patient continued to have fatigue and had respiratory issues treated by primary care 11/8/2023 with Mucinex and Flonase, subsequent reevaluation 11/13 by immediate care treated with prednisone and Zithromax.    We are asked to see the patient for her anemia and she is seen in office 11/28/2023.  We discussed her findings today in particular her variable anemia which is improved in office 11/28/2023.      After discussion she will be sent back to laboratory obtaining CMP, EPO level, MATHEUS, PE, free serum light chains, MMA, sed rate, B12 level, MATHEUS, PE, free serum light chains.  She had been in her primary care's office today undergoing TSH, free T4, hemoglobin A1c, ferritin, iron profile as well.    Her studies included a CMP with blood glucose of 163, otherwise normal, total cholesterol 149, triglycerides 341, HDL 39, ferritin of 31.9, iron profile at 50% saturation, hemoglobin A1c of 8.0, TSH 0.677, free T41.15, B12 of 551, MMA of 114, IRF of 19.8, reticulocyte percentage 1.48, reticulocyte hemoglobin 31.2, paraprotein analysis with no monoclonal spike, free light chain kappa light 22.7, free lambda light chain 19.1 and kappa/lambda ratio of 1.19, EPO level 24.3, ESR 10, negative NEW comprehensive profile.    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          Past Medical History:   Diagnosis Date   • Allergic    • Anxiety    • Arthritis    • Arthritis of back    • Arthritis of neck    • CAD (coronary  artery disease)     50% mid LAD by cath on 8/14/2017   • Depression    • Diabetes mellitus    • Diverticulosis    • Fatty liver 2018   • GERD (gastroesophageal reflux disease)    • H/O Anal fissure    • Hip arthrosis    • Hyperlipidemia    • Hypertension    • Hypothyroidism    • Irritable bowel syndrome    • Knee swelling    • Low back pain    • Low back strain    • Obesity    • ALEXANDER (obstructive sleep apnea)    • PAD (peripheral artery disease)    • Stress fracture         Past Surgical History:   Procedure Laterality Date   • ADENOIDECTOMY     • CARDIAC CATHETERIZATION Left 08/14/2017    [facility]   • CHOLECYSTECTOMY     • COLONOSCOPY  2015    Diverticulosis, hemorrhoids   • COLONOSCOPY W/ POLYPECTOMY  2020   • ENDOSCOPY  2016   • FOOT SURGERY     • GALLBLADDER SURGERY  1991   • HAND SURGERY     • RECTOVAGINAL FISTULA REPAIR     • STEROID INJECTION HIP Left 11/21/2022    Procedure: HIP INJECTION UNDER FLUORO - left;  Surgeon: Rhona Hammer MD;  Location: [facility] MAIN OR;  Service: Pain Management;  Laterality: Left;   • TONSILLECTOMY     • TRIGGER FINGER RELEASE     • TRIGGER POINT INJECTION     • VULVA BIOPSY      11/2021 Colonoscopy by Dr Hurley- rectal polyp    Current Outpatient Medications on File Prior to Visit   Medication Sig Dispense Refill   • Acetaminophen (TYLENOL PO) Take 650 mg by mouth 2 (Two) Times a Day.     • amLODIPine (NORVASC) 5 MG tablet Take 1 tablet by mouth once daily 90 tablet 0   • aspirin 81 MG EC tablet Take 1 tablet by mouth Daily.     • atorvastatin (LIPITOR) 40 MG tablet Take 1 tablet by mouth Every Night. 90 tablet 3   • benzonatate (TESSALON) 100 MG capsule Take 1 capsule by mouth 3 (Three) Times a Day As Needed for Cough.     • carvedilol (COREG) 3.125 MG tablet Take 1 tablet by mouth 2 (Two) Times a Day. 60 tablet 11   • cetirizine (zyrTEC) 10 MG tablet Take 1 tablet by mouth Daily for 30 days. 30 tablet 0   • clobetasol (TEMOVATE) 0.05 % cream Apply 1 application topically to  the appropriate area as directed every night at bedtime. 45 g 0   • colestipol (COLESTID) 1 g tablet Take 1 tablet by mouth 2 (Two) Times a Day. 60 tablet 5   • Diclofenac Sodium (VOLTAREN) 1 % gel gel Apply 4 g topically to the appropriate area as directed 4 (Four) Times a Day.     • dicyclomine (BENTYL) 10 MG capsule Take 1 capsule by mouth three times daily as needed 90 capsule 0   • escitalopram (LEXAPRO) 10 MG tablet Take 1 tablet by mouth once daily 90 tablet 0   • esomeprazole (nexIUM) 40 MG capsule Take 1 capsule by mouth Every Morning Before Breakfast. 90 capsule 3   • fluticasone (FLONASE) 50 MCG/ACT nasal spray 2 sprays into the nostril(s) as directed by provider Daily. 9.9 mL 0   • gabapentin (NEURONTIN) 300 MG capsule TAKE 1 CAPSULE BY MOUTH THREE TIMES DAILY 270 capsule 0   • Januvia 50 MG tablet Take 1 tablet by mouth once daily 90 tablet 0   • levothyroxine (SYNTHROID, LEVOTHROID) 50 MCG tablet Take 1 tablet by mouth once daily 90 tablet 0   • Magnesium 100 MG capsule Take  by mouth.     • metFORMIN ER (GLUCOPHAGE-XR) 500 MG 24 hr tablet TAKE 1 TABLET BY MOUTH TWICE DAILY BEFORE MEAL(S) 180 tablet 0   • methocarbamol (ROBAXIN) 500 MG tablet Take 1 tablet by mouth 3 (Three) Times a Day As Needed for Muscle Spasms. 90 tablet 0   • nystatin (MYCOSTATIN) 172758 UNIT/GM ointment Apply 1 application topically to the appropriate area as directed 2 (Two) Times a Day. 30 g 0   • Probiotic Product (ALIGN PO) Take  by mouth.     • saccharomyces boulardii (FLORASTOR) 250 MG capsule Take 1 capsule by mouth 2 (Two) Times a Day. (Patient not taking: Reported on 11/28/2023)       No current facility-administered medications on file prior to visit.        ALLERGIES:    Allergies   Allergen Reactions   • Lidocaine Other (See Comments)     Does not work on pt   • Keflex [Cephalexin] Rash   • Penicillins Rash        Social History     Socioeconomic History   • Marital status:      Spouse name: Haresh   • Number of  "children: 2   Tobacco Use   • Smoking status: Former     Packs/day: 1.00     Years: 30.00     Additional pack years: 0.00     Total pack years: 30.00     Types: Cigarettes     Start date: 1965     Quit date: 1991     Years since quittin.6   • Smokeless tobacco: Never   • Tobacco comments:     quit in her 40's   Vaping Use   • Vaping Use: Never used   Substance and Sexual Activity   • Alcohol use: Not Currently     Comment: 1 drink every blue moon   • Drug use: Never   • Sexual activity: Not Currently     Partners: Male     Birth control/protection: None        Family History   Problem Relation Age of Onset   • Heart disease Mother    • Hypertension Mother    • COPD Mother    • Alcohol abuse Brother    • Colon cancer Neg Hx    • Colon polyps Neg Hx    • Crohn's disease Neg Hx    • Irritable bowel syndrome Neg Hx    • Ulcerative colitis Neg Hx         Review of Systems   Constitutional:  Positive for diaphoresis and fatigue. Negative for fever and unexpected weight change.   HENT:  Positive for mouth sores (Dry mouth).    Eyes: Negative.    Respiratory:  Positive for cough and shortness of breath.         Recent URI   Cardiovascular:  Positive for palpitations (Recent holter).   Gastrointestinal:  Positive for abdominal pain and diarrhea.        Fatty food intolrance   Endocrine: Negative.    Genitourinary:  Positive for frequency.   Musculoskeletal:  Positive for myalgias (night cramps).   Neurological: Negative.    Hematological: Negative.    Psychiatric/Behavioral: Negative.          Objective    Vitals:    24 1548   Temp: 97.7 °F (36.5 °C)   TempSrc: Temporal   Weight: 78 kg (172 lb)   Height: 157 cm (61.81\")   PainSc: 0-No pain           2024     3:49 PM   Current Status   ECOG score 0       Physical Exam  Constitutional:       Appearance: She is obese.   HENT:      Head: Normocephalic and atraumatic.      Nose: Nose normal.      Mouth/Throat:      Mouth: Mucous membranes are moist.      " Pharynx: Oropharynx is clear.   Eyes:      Extraocular Movements: Extraocular movements intact.      Conjunctiva/sclera: Conjunctivae normal.      Pupils: Pupils are equal, round, and reactive to light.   Cardiovascular:      Rate and Rhythm: Normal rate and regular rhythm.      Pulses: Normal pulses.      Heart sounds: Normal heart sounds.   Pulmonary:      Effort: Pulmonary effort is normal.      Breath sounds: Normal breath sounds.   Abdominal:      General: Bowel sounds are normal.      Palpations: Abdomen is soft.   Musculoskeletal:         General: Normal range of motion.      Cervical back: Normal range of motion and neck supple.   Skin:     General: Skin is warm and dry.   Neurological:      General: No focal deficit present.      Mental Status: She is oriented to person, place, and time.   Psychiatric:         Mood and Affect: Mood normal.         Behavior: Behavior normal.           RECENT LABS:  Hematology WBC   Date Value Ref Range Status   01/09/2024 6.96 3.40 - 10.80 10*3/mm3 Final   11/28/2023 8.03 3.40 - 10.80 10*3/mm3 Final   05/01/2023 7.49 4.5 - 11.0 10*3/uL Final     RBC   Date Value Ref Range Status   01/09/2024 4.24 3.77 - 5.28 10*6/mm3 Final   11/28/2023 4.38 3.77 - 5.28 10*6/mm3 Final   05/01/2023 4.22 4.0 - 5.2 10*6/uL Final     Hemoglobin   Date Value Ref Range Status   01/09/2024 10.9 (L) 12.0 - 15.9 g/dL Final   05/01/2023 10.7 (L) 12.0 - 16.0 g/dL Final     Hematocrit   Date Value Ref Range Status   01/09/2024 34.7 34.0 - 46.6 % Final   05/01/2023 33.9 (L) 36.0 - 46.0 % Final     Platelets   Date Value Ref Range Status   01/09/2024 246 140 - 450 10*3/mm3 Final   05/01/2023 233 140 - 440 10*3/uL Final          Assessment & Plan    73-year-old female with a history including GE reflux, gastroparesis, IBS, previous cholecystectomy, nonobstructive coronary artery disease, peripheral vascular disease, worsening leg cramps found to have variable anemia over the last several visits.  On  occasion this has been thought to be iron deficient and she has been treated with oral iron on previous occasions.  Peripheral smear in office today reveals normocytic normochromic RBCs with mild polychromatophilia, microcytosis and hypochromia.  Leukocytes appear normal per number differential, platelets appear normal per number and size.    The patient's anemia is not progressive, and is suspected to be related to her chronic illnesses, but we do need to assess to try to determine whether we could improve this for her.    After discussion she will be sent back to laboratory obtaining CMP, EPO level, MATHEUS, PE, free serum light chains, MMA, sed rate, B12 level, MATHEUS, PE, free serum light chains.  She had been in her primary care's office today undergoing TSH, free T4, hemoglobin A1c, ferritin, iron profile as well.    Her studies included a CMP with blood glucose of 163, otherwise normal, total cholesterol 149, triglycerides 341, HDL 39, ferritin of 31.9, iron profile at 50% saturation, hemoglobin A1c of 8.0, TSH 0.677, free T41.15, B12 of 551, MMA of 114, IRF of 19.8, reticulocyte percentage 1.48, reticulocyte hemoglobin 31.2, paraprotein analysis with no monoclonal spike, free light chain kappa light 22.7, free lambda light chain 19.1 and kappa/lambda ratio of 1.19, EPO level 24.3, ESR 10, negative NEW comprehensive profile.    The patient is seen 1/9/2024 with modest improvement in her hemoglobin hematocrit no significant abnormalities on additional testing.  There is likely a component of anemia of chronic disease present no additional intervention is not yet felt necessary.    Plan:  *23 weeks ferritin, iron profile, CBC, CMP    *24 weeks MD

## 2024-01-09 NOTE — PROGRESS NOTES
REASON FOR FOLLOW-UP: Anemia    History of Present Illness   The patient is 73-year-old female followed with below medical history and seen by subspecialists including GI 3/23/2023 for GE reflux, gastroparesis and IBS as well as previous cholecystectomy with studies at that point including H&H 11.1 and 34.4 with MCV of 80.0 and MCH 25.8.  Her studies 4/3 include a ferritin at 18.4 and iron of 51.  At this point she was followed by cardiology seen 4/27/2023 with a history of nonobstructive coronary artery disease, sleep apnea on CPAP and  peripheral vascular disease with left lower extremity claudication.  She had been admitted to [facility] for epigastric discomfort undergoing nuclear stress test that was significant for possible infarct but no ischemia.  She is thought to have symptoms from GI origin and was relatively stable with plans to repeat echocardiogram.  This was obtained 5/10/2023 with LV SF of 57.8%, normal left ventricular cavity size and wall thickness, left ventricular diastolic function with grade 1 impaired relaxation.    She had further assessment 5/1/2023 with H&H 10.7 and 33.9, MCHC 25.4.    Patient was seen by primary care 10/6/2023 with leg cramps that were worsening treated symptomatically with electrolyte supplementation, PAD had improvement graded exercise with the patient unable to tolerate Pletal.  She was reviewed by orthopedics 10/12/2023 for her worsening foot pain and thought possibly to have an occult fracture or stress fracture.  Plain films demonstrated degenerative changes with calcaneal spurs but no acute fracture.    Repeat studies 10/20/2023 with H&H of 10.8 and 33.9, MCH of 25.5, MCV 80.1, platelet count 247,000, free T41.01, TSH 1.680.  Her further cardiac exams were unremarkable 11/2/2023 of the patient continued to have fatigue and had respiratory issues treated by primary care 11/8/2023 with Mucinex and Flonase, subsequent reevaluation 11/13 by immediate care  treated with prednisone and Zithromax.    We are asked to see the patient for her anemia and she is seen in office 11/28/2023.  We discussed her findings today in particular her variable anemia which is improved in office 11/28/2023.      After discussion she will be sent back to laboratory obtaining CMP, EPO level, MATHEUS, PE, free serum light chains, MMA, sed rate, B12 level, MATHEUS, PE, free serum light chains.  She had been in her primary care's office today undergoing TSH, free T4, hemoglobin A1c, ferritin, iron profile as well.    Her studies included a CMP with blood glucose of 163, otherwise normal, total cholesterol 149, triglycerides 341, HDL 39, ferritin of 31.9, iron profile at 50% saturation, hemoglobin A1c of 8.0, TSH 0.677, free T41.15, B12 of 551, MMA of 114, IRF of 19.8, reticulocyte percentage 1.48, reticulocyte hemoglobin 31.2, paraprotein analysis with no monoclonal spike, free light chain kappa light 22.7, free lambda light chain 19.1 and kappa/lambda ratio of 1.19, EPO level 24.3, ESR 10, negative NEW comprehensive profile.    The patient is seen in office 1/9/2024 feeling about the same with general fatigue as result of a busy job-pharmacy tech at [employer].          Past Medical History:   Diagnosis Date    Allergic     Anxiety     Arthritis     Arthritis of back     Arthritis of neck     CAD (coronary artery disease)     50% mid LAD by cath on 8/14/2017    Depression     Diabetes mellitus     Diverticulosis     Fatty liver 2018    GERD (gastroesophageal reflux disease)     H/O Anal fissure     Hip arthrosis     Hyperlipidemia     Hypertension     Hypothyroidism     Irritable bowel syndrome     Knee swelling     Low back pain     Low back strain     Obesity     ALEXANDER (obstructive sleep apnea)     PAD (peripheral artery disease)     Stress fracture         Past Surgical History:   Procedure Laterality Date    ADENOIDECTOMY      CARDIAC CATHETERIZATION Left 08/14/2017    [facility] [employer]     CHOLECYSTECTOMY      COLONOSCOPY  2015    Diverticulosis, hemorrhoids    COLONOSCOPY W/ POLYPECTOMY  2020    ENDOSCOPY  2016    FOOT SURGERY      GALLBLADDER SURGERY  1991    HAND SURGERY      RECTOVAGINAL FISTULA REPAIR      STEROID INJECTION HIP Left 11/21/2022    Procedure: HIP INJECTION UNDER FLUORO - left;  Surgeon: Rhona Hammer MD;  Location: [facility] MAIN OR;  Service: Pain Management;  Laterality: Left;    TONSILLECTOMY      TRIGGER FINGER RELEASE      TRIGGER POINT INJECTION      VULVA BIOPSY      11/2021 Colonoscopy by Dr Hurley- rectal polyp    Current Outpatient Medications on File Prior to Visit   Medication Sig Dispense Refill    Acetaminophen (TYLENOL PO) Take 650 mg by mouth 2 (Two) Times a Day.      amLODIPine (NORVASC) 5 MG tablet Take 1 tablet by mouth once daily 90 tablet 0    aspirin 81 MG EC tablet Take 1 tablet by mouth Daily.      atorvastatin (LIPITOR) 40 MG tablet Take 1 tablet by mouth Every Night. 90 tablet 3    benzonatate (TESSALON) 100 MG capsule Take 1 capsule by mouth 3 (Three) Times a Day As Needed for Cough.      carvedilol (COREG) 3.125 MG tablet Take 1 tablet by mouth 2 (Two) Times a Day. 60 tablet 11    cetirizine (zyrTEC) 10 MG tablet Take 1 tablet by mouth Daily for 30 days. 30 tablet 0    clobetasol (TEMOVATE) 0.05 % cream Apply 1 application topically to the appropriate area as directed every night at bedtime. 45 g 0    colestipol (COLESTID) 1 g tablet Take 1 tablet by mouth 2 (Two) Times a Day. 60 tablet 5    Diclofenac Sodium (VOLTAREN) 1 % gel gel Apply 4 g topically to the appropriate area as directed 4 (Four) Times a Day.      dicyclomine (BENTYL) 10 MG capsule Take 1 capsule by mouth three times daily as needed 90 capsule 0    escitalopram (LEXAPRO) 10 MG tablet Take 1 tablet by mouth once daily 90 tablet 0    esomeprazole (nexIUM) 40 MG capsule Take 1 capsule by mouth Every Morning Before Breakfast. 90 capsule 3    fluticasone (FLONASE) 50 MCG/ACT nasal spray 2  sprays into the nostril(s) as directed by provider Daily. 9.9 mL 0    gabapentin (NEURONTIN) 300 MG capsule TAKE 1 CAPSULE BY MOUTH THREE TIMES DAILY 270 capsule 0    Januvia 50 MG tablet Take 1 tablet by mouth once daily 90 tablet 0    levothyroxine (SYNTHROID, LEVOTHROID) 50 MCG tablet Take 1 tablet by mouth once daily 90 tablet 0    Magnesium 100 MG capsule Take  by mouth.      metFORMIN ER (GLUCOPHAGE-XR) 500 MG 24 hr tablet TAKE 1 TABLET BY MOUTH TWICE DAILY BEFORE MEAL(S) 180 tablet 0    methocarbamol (ROBAXIN) 500 MG tablet Take 1 tablet by mouth 3 (Three) Times a Day As Needed for Muscle Spasms. 90 tablet 0    nystatin (MYCOSTATIN) 163812 UNIT/GM ointment Apply 1 application topically to the appropriate area as directed 2 (Two) Times a Day. 30 g 0    Probiotic Product (ALIGN PO) Take  by mouth.      saccharomyces boulardii (FLORASTOR) 250 MG capsule Take 1 capsule by mouth 2 (Two) Times a Day. (Patient not taking: Reported on 2023)       No current facility-administered medications on file prior to visit.        ALLERGIES:    Allergies   Allergen Reactions    Lidocaine Other (See Comments)     Does not work on pt    Keflex [Cephalexin] Rash    Penicillins Rash        Social History     Socioeconomic History    Marital status:      Spouse name: Haresh    Number of children: 2   Tobacco Use    Smoking status: Former     Packs/day: 1.00     Years: 30.00     Additional pack years: 0.00     Total pack years: 30.00     Types: Cigarettes     Start date: 1965     Quit date: 1991     Years since quittin.6    Smokeless tobacco: Never    Tobacco comments:     quit in her 40's   Vaping Use    Vaping Use: Never used   Substance and Sexual Activity    Alcohol use: Not Currently     Comment: 1 drink every blue moon    Drug use: Never    Sexual activity: Not Currently     Partners: Male     Birth control/protection: None        Family History   Problem Relation Age of Onset    Heart disease Mother   "   Hypertension Mother     COPD Mother     Alcohol abuse Brother     Colon cancer Neg Hx     Colon polyps Neg Hx     Crohn's disease Neg Hx     Irritable bowel syndrome Neg Hx     Ulcerative colitis Neg Hx         Review of Systems   Constitutional:  Positive for diaphoresis and fatigue. Negative for fever and unexpected weight change.   HENT:  Positive for mouth sores (Dry mouth).    Eyes: Negative.    Respiratory:  Positive for cough and shortness of breath.         Recent URI   Cardiovascular:  Positive for palpitations (Recent holter).   Gastrointestinal:  Positive for abdominal pain and diarrhea.        Fatty food intolrance   Endocrine: Negative.    Genitourinary:  Positive for frequency.   Musculoskeletal:  Positive for myalgias (night cramps).   Neurological: Negative.    Hematological: Negative.    Psychiatric/Behavioral: Negative.          Objective     Vitals:    01/09/24 1548   Temp: 97.7 °F (36.5 °C)   TempSrc: Temporal   Weight: 78 kg (172 lb)   Height: 157 cm (61.81\")   PainSc: 0-No pain           1/9/2024     3:49 PM   Current Status   ECOG score 0       Physical Exam  Constitutional:       Appearance: She is obese.   HENT:      Head: Normocephalic and atraumatic.      Nose: Nose normal.      Mouth/Throat:      Mouth: Mucous membranes are moist.      Pharynx: Oropharynx is clear.   Eyes:      Extraocular Movements: Extraocular movements intact.      Conjunctiva/sclera: Conjunctivae normal.      Pupils: Pupils are equal, round, and reactive to light.   Cardiovascular:      Rate and Rhythm: Normal rate and regular rhythm.      Pulses: Normal pulses.      Heart sounds: Normal heart sounds.   Pulmonary:      Effort: Pulmonary effort is normal.      Breath sounds: Normal breath sounds.   Abdominal:      General: Bowel sounds are normal.      Palpations: Abdomen is soft.   Musculoskeletal:         General: Normal range of motion.      Cervical back: Normal range of motion and neck supple.   Skin:     " General: Skin is warm and dry.   Neurological:      General: No focal deficit present.      Mental Status: She is oriented to person, place, and time.   Psychiatric:         Mood and Affect: Mood normal.         Behavior: Behavior normal.           RECENT LABS:  Hematology WBC   Date Value Ref Range Status   01/09/2024 6.96 3.40 - 10.80 10*3/mm3 Final   11/28/2023 8.03 3.40 - 10.80 10*3/mm3 Final   05/01/2023 7.49 4.5 - 11.0 10*3/uL Final     RBC   Date Value Ref Range Status   01/09/2024 4.24 3.77 - 5.28 10*6/mm3 Final   11/28/2023 4.38 3.77 - 5.28 10*6/mm3 Final   05/01/2023 4.22 4.0 - 5.2 10*6/uL Final     Hemoglobin   Date Value Ref Range Status   01/09/2024 10.9 (L) 12.0 - 15.9 g/dL Final   05/01/2023 10.7 (L) 12.0 - 16.0 g/dL Final     Hematocrit   Date Value Ref Range Status   01/09/2024 34.7 34.0 - 46.6 % Final   05/01/2023 33.9 (L) 36.0 - 46.0 % Final     Platelets   Date Value Ref Range Status   01/09/2024 246 140 - 450 10*3/mm3 Final   05/01/2023 233 140 - 440 10*3/uL Final          Assessment & Plan     73-year-old female with a history including GE reflux, gastroparesis, IBS, previous cholecystectomy, nonobstructive coronary artery disease, peripheral vascular disease, worsening leg cramps found to have variable anemia over the last several visits.  On occasion this has been thought to be iron deficient and she has been treated with oral iron on previous occasions.  Peripheral smear in office today reveals normocytic normochromic RBCs with mild polychromatophilia, microcytosis and hypochromia.  Leukocytes appear normal per number differential, platelets appear normal per number and size.    The patient's anemia is not progressive, and is suspected to be related to her chronic illnesses, but we do need to assess to try to determine whether we could improve this for her.    After discussion she will be sent back to laboratory obtaining CMP, EPO level, MATHEUS, PE, free serum light chains, MMA, sed rate, B12  level, MATHEUS, PE, free serum light chains.  She had been in her primary care's office today undergoing TSH, free T4, hemoglobin A1c, ferritin, iron profile as well.    Her studies included a CMP with blood glucose of 163, otherwise normal, total cholesterol 149, triglycerides 341, HDL 39, ferritin of 31.9, iron profile at 50% saturation, hemoglobin A1c of 8.0, TSH 0.677, free T41.15, B12 of 551, MMA of 114, IRF of 19.8, reticulocyte percentage 1.48, reticulocyte hemoglobin 31.2, paraprotein analysis with no monoclonal spike, free light chain kappa light 22.7, free lambda light chain 19.1 and kappa/lambda ratio of 1.19, EPO level 24.3, ESR 10, negative NEW comprehensive profile.    The patient is seen 1/9/2024 with modest improvement in her hemoglobin hematocrit no significant abnormalities on additional testing.  There is likely a component of anemia of chronic disease present no additional intervention is not yet felt necessary.    Plan:  *23 weeks ferritin, iron profile, CBC, CMP    *24 weeks MD

## 2024-01-18 ENCOUNTER — OFFICE VISIT (OUTPATIENT)
Dept: PAIN MEDICINE | Facility: CLINIC | Age: 74
End: 2024-01-18
Payer: MEDICARE

## 2024-01-18 ENCOUNTER — PREP FOR SURGERY (OUTPATIENT)
Dept: SURGERY | Facility: SURGERY CENTER | Age: 74
End: 2024-01-18
Payer: MEDICARE

## 2024-01-18 VITALS
WEIGHT: 180.2 LBS | DIASTOLIC BLOOD PRESSURE: 72 MMHG | RESPIRATION RATE: 20 BRPM | OXYGEN SATURATION: 97 % | TEMPERATURE: 96.9 F | BODY MASS INDEX: 33.16 KG/M2 | HEIGHT: 62 IN | SYSTOLIC BLOOD PRESSURE: 105 MMHG | HEART RATE: 65 BPM

## 2024-01-18 DIAGNOSIS — M16.12 PRIMARY OSTEOARTHRITIS OF LEFT HIP: ICD-10-CM

## 2024-01-18 DIAGNOSIS — G89.29 CHRONIC LEFT HIP PAIN: ICD-10-CM

## 2024-01-18 DIAGNOSIS — M46.1 SACROILIITIS: ICD-10-CM

## 2024-01-18 DIAGNOSIS — M46.1 SACROILIITIS: Primary | ICD-10-CM

## 2024-01-18 DIAGNOSIS — M25.552 CHRONIC LEFT HIP PAIN: ICD-10-CM

## 2024-01-18 PROCEDURE — 1159F MED LIST DOCD IN RCRD: CPT | Performed by: PHYSICIAN ASSISTANT

## 2024-01-18 PROCEDURE — 1160F RVW MEDS BY RX/DR IN RCRD: CPT | Performed by: PHYSICIAN ASSISTANT

## 2024-01-18 PROCEDURE — 99214 OFFICE O/P EST MOD 30 MIN: CPT | Performed by: PHYSICIAN ASSISTANT

## 2024-01-18 PROCEDURE — 1125F AMNT PAIN NOTED PAIN PRSNT: CPT | Performed by: PHYSICIAN ASSISTANT

## 2024-01-18 RX ORDER — DIAZEPAM 5 MG/1
10 TABLET ORAL ONCE
OUTPATIENT
Start: 2024-01-18 | End: 2024-01-18

## 2024-01-18 NOTE — H&P (VIEW-ONLY)
CHIEF COMPLAINT  HIP PAIN  Pain is worse.      Subjective   Pearl Christiansen is a 73 y.o. female  who presents for follow-up.  She has a history of chronic left hip pain.  The patient states that up to approximately 1 week ago she was doing fairly well but has had sudden onset of pain predominantly within the left posterior buttock radiating to the left hip and groin which is aggravated with sitting or lying on either side.  She is also found that getting up from a seated position, pivoting and making certain maneuvers will also cause her to have increased pain.    Pain today 4/10 VAS in severity.      Hip Pain   There was no injury mechanism. The pain is present in the left hip. The quality of the pain is described as burning. The pain is at a severity of 4/10. The pain is moderate. The pain has been Worsening since onset. Pertinent negatives include no numbness. She reports no foreign bodies present. The symptoms are aggravated by movement.        PEG Assessment   What number best describes your pain on average in the past week?9  What number best describes how, during the past week, pain has interfered with your enjoyment of life?9  What number best describes how, during the past week, pain has interfered with your general activity?  9    Review of Pertinent Medical Data ---  8/5/22 XR HIP W OR WO PELVIS 2-3 VIEW LEFT-     Clinical: Left hip pain x4 days     FINDINGS: There is mild narrowing of the left hip joint. No fracture of  the left hip or hemipelvis. There is a 6 x 3 mm calcification projecting  along the lower margin of the hip joint. Likely degenerative. No  cortical bone donor site to suggest an avulsion fracture.     CONCLUSION: Left hip joint degeneration as described above. No acute  osseous articular abnormality.     This report was finalized on 8/5/2022 11:01 AM by Dr. Tyson Naranjo M.D.    The following portions of the patient's history were reviewed and updated as appropriate: allergies, current  "medications, past family history, past medical history, past social history, past surgical history, and problem list.    Review of Systems   Constitutional:  Negative for activity change (less), fatigue and fever.   HENT:  Negative for congestion.    Respiratory:  Negative for cough and chest tightness.    Cardiovascular:  Negative for chest pain.   Gastrointestinal:  Negative for abdominal pain, constipation and diarrhea.   Genitourinary:  Negative for difficulty urinating and dysuria.   Neurological:  Positive for weakness (legs). Negative for dizziness, light-headedness, numbness and headaches.   Psychiatric/Behavioral:  Positive for sleep disturbance. Negative for agitation and suicidal ideas. The patient is not nervous/anxious.      I have reviewed and confirmed the accuracy of the ROS as documented by the MA/LPN/RN DILCIA Valdivia  Vitals:    01/18/24 0947   BP: 105/72   BP Location: Left arm   Patient Position: Sitting   Cuff Size: Large Adult   Pulse: 65   Resp: 20   Temp: 96.9 °F (36.1 °C)   TempSrc: Temporal   SpO2: 97%   Weight: 81.7 kg (180 lb 3.2 oz)   Height: 157 cm (61.81\")   PainSc:   4         Objective   Physical Exam  Vitals and nursing note reviewed.   Constitutional:       Appearance: Normal appearance.   HENT:      Head: Normocephalic.   Pulmonary:      Effort: Pulmonary effort is normal.   Musculoskeletal:      Lumbar back: Spasms and tenderness (MODERATE PAIN TO PALPATION OVER THE LEFT SI JOINT SPACE; +PATRICKS/+SI THRUST AND +SI COMPRESSION TESTS ON LEFT) present. Decreased range of motion.        Back:    Skin:     General: Skin is warm and dry.   Neurological:      General: No focal deficit present.      Mental Status: She is alert and oriented to person, place, and time.      Cranial Nerves: Cranial nerves 2-12 are intact.      Sensory: Sensation is intact.      Motor: Motor function is intact.      Gait: Gait abnormal (AMBULATING WITH SIGNIFICANT LIMP).   Psychiatric:         Mood and " Affect: Mood normal.         Behavior: Behavior normal.         Thought Content: Thought content normal.         Judgment: Judgment normal.           Assessment & Plan   Diagnoses and all orders for this visit:    1. Sacroiliitis  -     Ibuprofen 3 %, Gabapentin 10 %, Baclofen 2 %, lidocaine 4 %, Ketamine HCl 4 %; Apply 1-2 g topically to the appropriate area as directed 3 (Three) to 4 (Four) times daily.  Dispense: 90 g; Refill: 5    2. Primary osteoarthritis of left hip    3. Chronic left hip pain  -     Ibuprofen 3 %, Gabapentin 10 %, Baclofen 2 %, lidocaine 4 %, Ketamine HCl 4 %; Apply 1-2 g topically to the appropriate area as directed 3 (Three) to 4 (Four) times daily.  Dispense: 90 g; Refill: 5        Pearl Christiansen reports a pain score of 4.  Given her pain assessment as noted, treatment options were discussed and the following options were decided upon as a follow-up plan to address the patient's pain: continuation of current treatment plan for pain, steroid injections, and use of non-medical modalities (ice, heat, stretching and/or behavior modifications).      --- Based on patient's physical exam with pain reproducible with SI provoking maneuvers I feel that she may benefit from #1 diagnostic/therapeutic left sacroiliac joint injection.  The risk and benefits of the procedure have been discussed with the patient and her questions have been addressed.  --- I will reassess the patient following the SIJ injection and determine if she would benefit from repeat therapeutic left intra-articular hip injection.  ----A prescription has been sent for ketamine topical pain cream  --- Follow-up 4 weeks after injective therapy       ----------  Education about Sacroiliac joint injections:  This Sacroiliac joint injection (blockade) we have suggested is intended for diagnostic purposes, with the intent of offering the patient Radiofrequency thermal rhizotomy (RF) of the sensory branches to the joint if the block is  diagnostically effective.  The diagnostic blockade is necessary to determine the likelihood that RF therapy could be efficacious in providing long term relief to the patient.    In this procedure, the sacroiliac joint is aligned with imaging, and under image guidance a needle is placed with the needle tip into the joint.  The needle position is confirmed to be appropriately in the joint before injection of medication into the joint.  When xray fluoroscopy is used, contrast dye is used to confirm a proper arthrogram (i.e., outline of the joint).  When ultrasound is used, IV fluid (normal saline) is injected to see the flow of the fluid into the joint.  Once confirmed, then the medication can be injected into the joint.  Oftentimes this medication is a combination of local anesthetics (for diagnostic purposes) and also a steroid (to decrease irritation & inflammation in the joint, also known as sacroilitis).      Medically, a successful RF procedure should provide a patient with 50% pain relief or more for at least 6 months.  Clinical experience suggests that successful patients receive relief more in the range of 12 months on average.  We also discussed that many patients receive therapeutic success from the intraarticular joint injection, and may not require RF ablation.  If a patient receives more than 8 weeks of relief from joint injection, then occasional repeat joint blocks for therapeutic purposes is a very reasonable alternative therapy.  This course of therapy is consistent with our LCDs according to our CMS  in the area, and therefore other insurance providers should follow accordingly.  We will monitor our patients to screen for these therapeutic responders and will offer RF therapy only when necessary.      We discussed that joint injections & also RF procedures are not without risks.  Best practices regarding anticoagulant use & neuraxial procedures will be respected.  Oftentimes a patient on  an anticoagulant can be offered a joint injection safely, but again this is not risk-free, and such patients give consent with regards to this increased bleeding risk, which could cause problems including but not limited to worsening of pain, nerve damage, or muscle damage.  Patients that are ill or otherwise may be at risk for sepsis will not have their spines accessed by neuraxial injections of any type.  This patient will not be offered these therapies if there is an increased risk.   We discussed that there is a risk of postprocedural pain and also a risk of worsening of clinical picture with these procedures as with any neuraxial procedure.    ----------        Dictated utilizing Dragon dictation.

## 2024-01-23 ENCOUNTER — TRANSCRIBE ORDERS (OUTPATIENT)
Dept: SURGERY | Facility: SURGERY CENTER | Age: 74
End: 2024-01-23
Payer: MEDICARE

## 2024-01-23 DIAGNOSIS — Z41.9 SURGERY, ELECTIVE: Primary | ICD-10-CM

## 2024-01-24 NOTE — DISCHARGE INSTRUCTIONS
[facility] - Post-procedure Instructions          --  While there are no absolute restrictions, it is recommended that you do not perform strenuous activity today. In the morning, you may resume your level of activity as before your block.    --  If you have a band-aid at your injection site, please remove it later today. Observe the area for any redness, swelling, pus-like drainage, or a temperature over 101°. If any of these symptoms occur, please call your doctor at [phone]. If after office hours, leave a message and the on-call provider will return your call.    --  Ice may be applied to your injection site. It is recommended you avoid direct heat (heating pad; hot tub) for 1-2 days.    --  Call [facility] at [phone] if you experience persistent headache, persistent bleeding from the injection site, or severe pain not relieved by heat or oral medication.    --  Do not make important decisions today.    --  Due to the effects of the block and/or the I.V. Sedation, DO NOT drive or operate hazardous machinery for 12 hours.  Local anesthetics may cause numbness after procedure and precautions must be taken with regards to operating equipment as well as with walking, even if ambulating with assistance of another person or with an assistive device.    --  Do not drink alcohol for 12 hours.    -- You may return to work tomorrow, or as directed by your referring doctor.    --  Occasionally you may notice a slight increase in your pain after the procedure. This should start to improve within the next 24-48 hours. Radiofrequency ablation procedure pain may last 3-4 weeks.    --  It may take as long as 3-4 days before you notice a gradual improvement in your pain and/or other symptoms.    -- You may continue to take your prescribed pain medication as needed.    --  Some normal possible side effects of steroid use could include fluid retention, increased blood sugar, dull headache,  increased sweating, increased appetite, mood swings and flushing.    --  Diabetics are recommended to watch their blood glucose level closely for 24-48 hours after the injection.    --  Must stay in PACU for 20 min upon arrival and prove no leg weakness before being discharged.    --  IN THE EVENT OF A LIFE THREATENING EMERGENCY, (CHEST PAIN, BREATHING DIFFICULTIES, PARALYSIS…) YOU SHOULD GO TO YOUR NEAREST EMERGENCY ROOM.    --  You should be contacted by our office within 2-3 days to schedule follow up or next appointment date.  If not contacted within 7 days, please call the office at [phone]

## 2024-01-30 ENCOUNTER — TELEPHONE (OUTPATIENT)
Dept: PAIN MEDICINE | Facility: CLINIC | Age: 74
End: 2024-01-30

## 2024-01-30 NOTE — TELEPHONE ENCOUNTER
Patient would like a valium for her procedure. She stated that she had sedation during her last procedure which was in 2022. I informed her that since then medicare has changed the guidelines for sedation and unfortunately we cannot provide sedation. She was informed the valium would be provided in the surgery center.

## 2024-01-30 NOTE — TELEPHONE ENCOUNTER
Provider: DANA NELSON    Caller: TAWNY ANDRADE    Relationship to Patient: SELF    Pharmacy: [facility] [address]    Phone Number: [phone]    Reason for Call: WANTS TO KNOW IF SHE IS GOING TO BE PRESCRIBED A VALIUM FOR HER PROCEDURE ON 1/31 OR IF SHE WILL BE SEDATED. WOULD LIKE TO KNOW ASAP BECAUSE NOTHING WAS CALLED INTO HER PHARMACY. PLEASE CALL HER BACK AT THE NUMBER ABOVE.

## 2024-01-31 ENCOUNTER — HOSPITAL ENCOUNTER (OUTPATIENT)
Facility: SURGERY CENTER | Age: 74
Setting detail: HOSPITAL OUTPATIENT SURGERY
Discharge: HOME | End: 2024-01-31
Attending: ANESTHESIOLOGY | Admitting: ANESTHESIOLOGY
Payer: MEDICARE

## 2024-01-31 ENCOUNTER — HOSPITAL ENCOUNTER (OUTPATIENT)
Dept: GENERAL RADIOLOGY | Facility: SURGERY CENTER | Age: 74
Setting detail: HOSPITAL OUTPATIENT SURGERY
End: 2024-01-31
Payer: MEDICARE

## 2024-01-31 VITALS
WEIGHT: 180 LBS | TEMPERATURE: 97 F | RESPIRATION RATE: 18 BRPM | SYSTOLIC BLOOD PRESSURE: 138 MMHG | BODY MASS INDEX: 33.13 KG/M2 | HEIGHT: 62 IN | OXYGEN SATURATION: 94 % | HEART RATE: 71 BPM | DIASTOLIC BLOOD PRESSURE: 78 MMHG

## 2024-01-31 DIAGNOSIS — M46.1 SACROILIITIS: ICD-10-CM

## 2024-01-31 DIAGNOSIS — Z41.9 SURGERY, ELECTIVE: ICD-10-CM

## 2024-01-31 PROCEDURE — G0260 INJ FOR SACROILIAC JT ANESTH: HCPCS | Performed by: ANESTHESIOLOGY

## 2024-01-31 PROCEDURE — 77002 NEEDLE LOCALIZATION BY XRAY: CPT

## 2024-01-31 PROCEDURE — 25510000001 IOPAMIDOL 61 % SOLUTION 30 ML VIAL: Performed by: ANESTHESIOLOGY

## 2024-01-31 PROCEDURE — 25010000002 DEXAMETHASONE SODIUM PHOSPHATE 100 MG/10ML SOLUTION 10 ML VIAL: Performed by: ANESTHESIOLOGY

## 2024-01-31 RX ORDER — DIAZEPAM 5 MG/1
10 TABLET ORAL ONCE
Status: COMPLETED | OUTPATIENT
Start: 2024-01-31 | End: 2024-01-31

## 2024-01-31 RX ADMIN — DIAZEPAM 10 MG: 5 TABLET ORAL at 10:39

## 2024-01-31 NOTE — OP NOTE
Left Sacroiliac Joint Injection  [facility]      PREOPERATIVE DIAGNOSIS:   Sacroiliac joint dysfunction    POSTOPERATIVE DIAGNOSIS:  Same    PROCEDURE:  Sacroiliac Joint Injection, with fluoroscopic guidance CPT 37131 -LT    PRE-PROCEDURE DISCUSSION WITH PATIENT:    Risks and complications were discussed with the patient prior to starting the procedure and informed consent was obtained.  We discussed various topics including but not limited to bleeding, infection, injury, postprocedural site soreness, painful flareup, worsening of clinical picture, paralysis, coma, and death.     SURGEON:  Rhona Hammer MD    REASON FOR PROCEDURE:    Patient has pain consistent with SI pathology on history and physical exam.    SEDATION:  Anxiolysis was used for this procedure which included PO Valium 10mg  ANESTHETIC AGENT:  1% Lidocaine  STEROID AGENT:  10mg Dexamethasone    DESCRIPTON OF PROCEDURE:  After obtaining informed consent, IV access was not obtained in the preoperative area.  The patient was transported to the operative suite and placed in the prone position. EKG, blood pressure, and pulse oximeter were monitored. The lumbosacral area was prepped with Chloraprep and draped in a sterile fashion. Under fluoroscopic guidance the inferior most portion of the left SI joint was identified.  A 25-gauge spinal needle was then advanced under fluoroscopic guidance in a coaxial view into the joint space.  After negative aspiration, 1 mL of Isovue 61% was injected, and after seeing appropriate spread into the joint a volume of 3ml of the above medication was injected.    Needle was removed intact.      Vital signs remained stable.  The onset of analgesia was noted.    Pre-procedure pain score: 2/10 at rest, 10/10 with activity  Post-procedure pain score: 0/10      ESTIMATED BLOOD LOSS:  minimal  SPECIMENS:  None  COMPLICATIONS:  No complications were noted. and There was no indication of vascular uptake on  live injection of contrast dye.    TOLERANCE & DISCHARGE CONDITION:    The patient tolerated the procedure well.  The patient was transported to the recovery area without difficulties.  The patient was discharged to home under the care of family in stable and satisfactory condition.    PLAN OF CARE:  The patient was given our standard instruction sheet and will resume all medications as per the medication reconciliation sheet.  The patient will Return to clinic 4-6 wks.  The patient is instructed to keep an account of pain relief after the procedure.

## 2024-02-01 RX ORDER — ESCITALOPRAM OXALATE 10 MG/1
TABLET ORAL
Qty: 90 TABLET | Refills: 0 | Status: SHIPPED | OUTPATIENT
Start: 2024-02-01

## 2024-02-12 RX ORDER — SITAGLIPTIN 50 MG/1
TABLET, FILM COATED ORAL
Qty: 90 TABLET | Refills: 0 | Status: SHIPPED | OUTPATIENT
Start: 2024-02-12

## 2024-02-23 RX ORDER — ESCITALOPRAM OXALATE 10 MG/1
TABLET ORAL
Qty: 90 TABLET | Refills: 0 | Status: SHIPPED | OUTPATIENT
Start: 2024-02-23

## 2024-02-23 RX ORDER — GABAPENTIN 300 MG/1
CAPSULE ORAL
Qty: 270 CAPSULE | Refills: 0 | Status: SHIPPED | OUTPATIENT
Start: 2024-02-23

## 2024-02-23 RX ORDER — AMLODIPINE BESYLATE 5 MG/1
TABLET ORAL
Qty: 90 TABLET | Refills: 0 | Status: SHIPPED | OUTPATIENT
Start: 2024-02-23

## 2024-02-23 RX ORDER — METFORMIN HYDROCHLORIDE 500 MG/1
TABLET, EXTENDED RELEASE ORAL
Qty: 180 TABLET | Refills: 0 | Status: SHIPPED | OUTPATIENT
Start: 2024-02-23

## 2024-03-12 ENCOUNTER — OFFICE VISIT (OUTPATIENT)
Dept: PAIN MEDICINE | Facility: CLINIC | Age: 74
End: 2024-03-12
Payer: MEDICARE

## 2024-03-12 VITALS
HEART RATE: 75 BPM | HEIGHT: 62 IN | OXYGEN SATURATION: 95 % | DIASTOLIC BLOOD PRESSURE: 83 MMHG | TEMPERATURE: 96.8 F | BODY MASS INDEX: 33.27 KG/M2 | SYSTOLIC BLOOD PRESSURE: 139 MMHG | RESPIRATION RATE: 18 BRPM | WEIGHT: 180.8 LBS

## 2024-03-12 DIAGNOSIS — M46.1 SACROILIITIS: ICD-10-CM

## 2024-03-12 DIAGNOSIS — G89.29 CHRONIC LEFT HIP PAIN: ICD-10-CM

## 2024-03-12 DIAGNOSIS — M62.838 MUSCLE SPASM: Primary | ICD-10-CM

## 2024-03-12 DIAGNOSIS — M25.552 CHRONIC LEFT HIP PAIN: ICD-10-CM

## 2024-03-12 PROCEDURE — 1159F MED LIST DOCD IN RCRD: CPT | Performed by: PHYSICIAN ASSISTANT

## 2024-03-12 PROCEDURE — 1160F RVW MEDS BY RX/DR IN RCRD: CPT | Performed by: PHYSICIAN ASSISTANT

## 2024-03-12 PROCEDURE — 1125F AMNT PAIN NOTED PAIN PRSNT: CPT | Performed by: PHYSICIAN ASSISTANT

## 2024-03-12 PROCEDURE — 99214 OFFICE O/P EST MOD 30 MIN: CPT | Performed by: PHYSICIAN ASSISTANT

## 2024-03-12 RX ORDER — DICYCLOMINE HYDROCHLORIDE 10 MG/1
10 CAPSULE ORAL 3 TIMES DAILY PRN
Qty: 90 CAPSULE | Refills: 0 | Status: SHIPPED | OUTPATIENT
Start: 2024-03-12

## 2024-03-12 RX ORDER — TIZANIDINE 2 MG/1
TABLET ORAL
Qty: 60 TABLET | Refills: 1 | Status: SHIPPED | OUTPATIENT
Start: 2024-03-12

## 2024-03-12 NOTE — PROGRESS NOTES
CHIEF COMPLAINT  Back pain      Subjective   Pearl Christiansen is a 73 y.o. female  who presents to the office for follow-up of procedure.  She completed a SACROILIAC JOINT INJECTION LEFT  on  1/31/24 performed by Dr. Hammer for management of hip pain. Patient reports greater than 50% ongoing relief from the procedure. Patient stated that she does not have the sharp pain, just some dull pain now, the pain is not like it was.  She is also pleased to report at least 50% reduction of pain ongoing within the left hip from previous intra-articular joint injection which was completed on 11/21/2022 with Dr. Hammer.  Patient states that the pain that she does experience now has more of an aching sensation and is not as sharp or as intense as it had been previously.  Primary pain complaint today is that she has been experiencing newer onset of spasms affecting the superior part of the posterior calves bilaterally which usually only happens at night and has begun awakening her.    Pain today 5/10 VAS in severity.        Hip Pain   There was no injury mechanism. The pain is present in the left hip. The quality of the pain is described as burning. The pain is at a severity of 5/10. The pain is moderate. The pain has been Improving since onset. Pertinent negatives include no numbness. She reports no foreign bodies present. The symptoms are aggravated by movement.   Back Pain  This is a chronic problem. The current episode started more than 1 year ago. The problem occurs intermittently. The problem has been improved since onset. The pain is present in the sacro-iliac. The quality of the pain is described as aching. The pain does not radiate. The pain is at a severity of 5/10. The pain is moderate. The pain is Worse during the day. The symptoms are aggravated by position and sitting. Pertinent negatives include no abdominal pain, dysuria, headaches, numbness or weakness.        PEG Assessment   What number best describes your pain on  "average in the past week?5  What number best describes how, during the past week, pain has interfered with your enjoyment of life?3  What number best describes how, during the past week, pain has interfered with your general activity?  3    Review of Pertinent Medical Data ---  8/5/22 XR HIP W OR WO PELVIS 2-3 VIEW LEFT-     Clinical: Left hip pain x4 days     FINDINGS: There is mild narrowing of the left hip joint. No fracture of  the left hip or hemipelvis. There is a 6 x 3 mm calcification projecting  along the lower margin of the hip joint. Likely degenerative. No  cortical bone donor site to suggest an avulsion fracture.     CONCLUSION: Left hip joint degeneration as described above. No acute  osseous articular abnormality.     This report was finalized on 8/5/2022 11:01 AM by Dr. Tyson Naranjo M.D.    The following portions of the patient's history were reviewed and updated as appropriate: allergies, current medications, past family history, past medical history, past social history, past surgical history, and problem list.    Review of Systems   Constitutional:  Positive for fatigue. Negative for activity change.   Gastrointestinal:  Negative for abdominal pain, constipation and diarrhea.   Genitourinary:  Negative for difficulty urinating and dysuria.   Musculoskeletal:  Positive for back pain.   Neurological:  Negative for dizziness, weakness, light-headedness, numbness and headaches.   Psychiatric/Behavioral:  Negative for agitation, sleep disturbance and suicidal ideas. The patient is not nervous/anxious.      I have reviewed and confirmed the accuracy of the ROS as documented by the MA/LÓPEZ/RN DILCIA Valdivia   Vitals:    03/12/24 1523   BP: 139/83   BP Location: Left arm   Patient Position: Sitting   Cuff Size: Large Adult   Pulse: 75   Resp: 18   Temp: 96.8 °F (36 °C)   SpO2: 95%   Weight: 82 kg (180 lb 12.8 oz)   Height: 157.5 cm (62\")   PainSc:   5   PainLoc: Hip         Objective   Physical " Exam  Vitals and nursing note reviewed.   Constitutional:       Appearance: Normal appearance.   HENT:      Head: Normocephalic.   Pulmonary:      Effort: Pulmonary effort is normal.   Musculoskeletal:      Lumbar back: Spasms and tenderness (MILD PAIN TO PALPATION OVER THE LEFT SI JOINT SPACE) present. Decreased range of motion.        Back:    Skin:     General: Skin is warm and dry.   Neurological:      General: No focal deficit present.      Mental Status: She is alert and oriented to person, place, and time.      Cranial Nerves: Cranial nerves 2-12 are intact.      Sensory: Sensation is intact.      Motor: Motor function is intact.      Gait: Gait abnormal (AMBULATING WITH SIGNIFICANT LIMP).   Psychiatric:         Mood and Affect: Mood normal.         Behavior: Behavior normal.         Thought Content: Thought content normal.         Judgment: Judgment normal.             Assessment & Plan   Diagnoses and all orders for this visit:    1. Muscle spasm (Primary)  -     tiZANidine (ZANAFLEX) 2 MG tablet; Take 1 or 2 tablets PO QHS tolerated  Dispense: 60 tablet; Refill: 1    2. Sacroiliitis    3. Chronic left hip pain        Pearl Christiansen reports a pain score of 5.  Given her pain assessment as noted, treatment options were discussed and the following options were decided upon as a follow-up plan to address the patient's pain: continuation of current treatment plan for pain, prescription for non-opiod analgesics, and use of non-medical modalities (ice, heat, stretching and/or behavior modifications).      --- Follow-up in 3 months or sooner as needed  --- I will prescribe low-dose tizanidine 2 mg to be taken 1 or 2 at bedtime for occurrence of lower extremity spasms.  --- I have urged the patient to contact her primary care provider, Dr. Estes, to advise her of these cramps as the patient is on hypertensive medications.  She has upcoming lab work and I have mentioned to her that she may want to mention this to  Dr. Estes in case any extra labs need to be performed.  She verbalizes understanding.                  Dictated utilizing Dragon dictation.

## 2024-03-12 NOTE — TELEPHONE ENCOUNTER
Rx Refill Note  Requested Prescriptions     Pending Prescriptions Disp Refills    dicyclomine (BENTYL) 10 MG capsule [Pharmacy Med Name: Dicyclomine HCl 10 MG Oral Capsule] 90 capsule 0     Sig: Take 1 capsule by mouth three times daily as needed      Last office visit with prescribing clinician: 10/6/2023   Last telemedicine visit with prescribing clinician: 11/8/2023   Next office visit with prescribing clinician: 4/18/2024                         Would you like a call back once the refill request has been completed: [] Yes [] No    If the office needs to give you a call back, can they leave a voicemail: [] Yes [] No    Josh Hernandez MA  03/12/24, 08:17 EDT

## 2024-03-18 RX ORDER — LEVOTHYROXINE SODIUM 0.05 MG/1
50 TABLET ORAL DAILY
Qty: 90 TABLET | Refills: 0 | Status: SHIPPED | OUTPATIENT
Start: 2024-03-18

## 2024-03-25 ENCOUNTER — OFFICE VISIT (OUTPATIENT)
Dept: GASTROENTEROLOGY | Facility: CLINIC | Age: 74
End: 2024-03-25
Payer: MEDICARE

## 2024-03-25 VITALS
HEIGHT: 62 IN | TEMPERATURE: 96.8 F | WEIGHT: 178.9 LBS | DIASTOLIC BLOOD PRESSURE: 80 MMHG | HEART RATE: 85 BPM | OXYGEN SATURATION: 95 % | SYSTOLIC BLOOD PRESSURE: 120 MMHG | BODY MASS INDEX: 32.92 KG/M2

## 2024-03-25 DIAGNOSIS — K31.84 GASTROPARESIS: ICD-10-CM

## 2024-03-25 DIAGNOSIS — K58.0 IRRITABLE BOWEL SYNDROME WITH DIARRHEA: Primary | ICD-10-CM

## 2024-03-25 DIAGNOSIS — K21.9 GASTROESOPHAGEAL REFLUX DISEASE WITHOUT ESOPHAGITIS: ICD-10-CM

## 2024-03-25 DIAGNOSIS — K76.0 FATTY LIVER: ICD-10-CM

## 2024-03-25 DIAGNOSIS — R10.11 RIGHT UPPER QUADRANT ABDOMINAL PAIN: ICD-10-CM

## 2024-03-25 PROCEDURE — 1159F MED LIST DOCD IN RCRD: CPT | Performed by: NURSE PRACTITIONER

## 2024-03-25 PROCEDURE — 99214 OFFICE O/P EST MOD 30 MIN: CPT | Performed by: NURSE PRACTITIONER

## 2024-03-25 PROCEDURE — 1160F RVW MEDS BY RX/DR IN RCRD: CPT | Performed by: NURSE PRACTITIONER

## 2024-03-25 RX ORDER — MONTELUKAST SODIUM 4 MG/1
1 TABLET, CHEWABLE ORAL 2 TIMES DAILY
Qty: 120 TABLET | Refills: 5 | Status: SHIPPED | OUTPATIENT
Start: 2024-03-25

## 2024-03-25 RX ORDER — METHYLPREDNISOLONE 4 MG/1
TABLET ORAL
COMMUNITY
Start: 2024-03-23

## 2024-03-25 RX ORDER — FAMOTIDINE 40 MG/1
40 TABLET, FILM COATED ORAL NIGHTLY PRN
Qty: 30 TABLET | Refills: 5 | Status: SHIPPED | OUTPATIENT
Start: 2024-03-25

## 2024-03-25 NOTE — PROGRESS NOTES
"Chief Complaint   Patient presents with    Diarrhea         History of Present Illness  Patient is a 73-year-old female who presents today for follow-up. She has a history of GERD, gastroparesis, fatty liver, IBS-D and has had a cholecystectomy.     Patient presents today for follow-up.  She reports she has been experiencing worsening diarrhea since last office visit.  This is worse after she eats and she generally has about 6 bowel movements per day.  Reports stool has been watery in consistency and appears dark at times.    He has been experiencing intermittent right upper quadrant and right flank pain.  This comes and goes.  She previously been told she had fatty liver.  She has had a cholecystectomy.    She has nausea on a regular basis but denies any vomiting.    Reports increased gas and bloating.    She previously been taking Lomotil which helped somewhat but did not completely control her diarrhea.     Result Review :       SCANNED - COLONOSCOPY (11/05/2021)    Office Visit with Suha Vogt APRN (03/23/2023)    Celiac Disease Panel (11/16/2021 13:25)     SCANNED - COLONOSCOPY (11/05/2021)     Vital Signs:   /80   Pulse 85   Temp 96.8 °F (36 °C)   Ht 157.5 cm (62.01\")   Wt 81.1 kg (178 lb 14.4 oz)   SpO2 95%   BMI 32.71 kg/m²     Body mass index is 32.71 kg/m².     Physical Exam  Vitals reviewed.   Constitutional:       General: She is not in acute distress.     Appearance: She is well-developed.   HENT:      Head: Normocephalic and atraumatic.   Pulmonary:      Effort: Pulmonary effort is normal. No respiratory distress.   Skin:     General: Skin is dry.      Coloration: Skin is not pale.   Neurological:      Mental Status: She is alert and oriented to person, place, and time.   Psychiatric:         Thought Content: Thought content normal.           Assessment and Plan    Diagnoses and all orders for this visit:    1. Irritable bowel syndrome with diarrhea (Primary)    2. Right upper " quadrant abdominal pain  -     US Abdomen Limited; Future    3. Gastroparesis    4. Fatty liver    5. Gastroesophageal reflux disease without esophagitis    Other orders  -     riFAXIMin (Xifaxan) 550 MG tablet; Take 1 tablet by mouth 3 (Three) Times a Day for 14 days.  Dispense: 42 tablet; Refill: 2  -     colestipol (COLESTID) 1 g tablet; Take 1 tablet by mouth 2 (Two) Times a Day. If no improvement in diarrhea, increase to 2 pills twice daily.  Dispense: 120 tablet; Refill: 5  -     famotidine (PEPCID) 40 MG tablet; Take 1 tablet by mouth At Night As Needed for Heartburn.  Dispense: 30 tablet; Refill: 5         Discussion  Patient presents today for follow-up with concerns about worsening diarrhea.  Symptoms felt to be consistent with irritable bowel syndrome with diarrhea.  Recommended treatment course of Xifaxan to address this and prescription sent to pharmacy.  Symptoms also to potentially be secondary to prior cholecystectomy and recommended trial of colestipol to address this.    Patient also with worsening reflux symptoms.  Reinforced dietary modifications to help with reflux and gastroparesis and recommended adding in Pepcid in the evening.    Patient also with concerns about right upper quadrant pain.  This may be secondary to fatty liver and stretch of the liver capsule.  Recommended right upper quadrant ultrasound for further evaluation.          Follow Up   Return in about 1 year (around 3/25/2025), or if symptoms worsen or fail to improve.    Patient Instructions   Schedule right upper quadrant ultrasound for further evaluation.    For GERD, continue esomeprazole daily in the morning and start famotidine as needed in the evening.  Prescription sent to pharmacy.    For GERD, follow antireflux precautions.  Recommend avoiding eating within 3 to 4 hours of bedtime.  Avoid foods that can trigger symptoms which may include citrus fruits, spicy foods, tomatoes and red sauces, chocolate, coffee/tea,  caffeinated or carbonated beverages, alcohol.     For gastroparesis, follow a gastroparesis diet. Eat smaller more frequent meals as opposed to large meals and foods lower in fat and fiber.     For fatty liver, weight loss is recommended. Recommend following a low fat and low sugar diet. Recommend management of diabetes and elevated cholesterol with primary care provider if indicated. Regular exercise is recommended. Alcohol avoidance is recommended.    For fatty liver, start taking milk thistle daily, available over the counter.     For IBS-D, complete course of Xifaxan as prescribed.     For diarrhea, proceed with trial of colestipol.

## 2024-03-25 NOTE — PATIENT INSTRUCTIONS
Schedule right upper quadrant ultrasound for further evaluation.    For GERD, continue esomeprazole daily in the morning and start famotidine as needed in the evening.  Prescription sent to pharmacy.    For GERD, follow antireflux precautions.  Recommend avoiding eating within 3 to 4 hours of bedtime.  Avoid foods that can trigger symptoms which may include citrus fruits, spicy foods, tomatoes and red sauces, chocolate, coffee/tea, caffeinated or carbonated beverages, alcohol.     For gastroparesis, follow a gastroparesis diet. Eat smaller more frequent meals as opposed to large meals and foods lower in fat and fiber.     For fatty liver, weight loss is recommended. Recommend following a low fat and low sugar diet. Recommend management of diabetes and elevated cholesterol with primary care provider if indicated. Regular exercise is recommended. Alcohol avoidance is recommended.    For fatty liver, start taking milk thistle daily, available over the counter.     For IBS-D, complete course of Xifaxan as prescribed.     For diarrhea, proceed with trial of colestipol.

## 2024-03-26 ENCOUNTER — TELEPHONE (OUTPATIENT)
Dept: GASTROENTEROLOGY | Facility: CLINIC | Age: 74
End: 2024-03-26
Payer: MEDICARE

## 2024-03-26 NOTE — TELEPHONE ENCOUNTER
She does not need to fill the Xifaxan.  Proceed with trial of colestipol which was also prescribed from recent office visit. Call back if colestipol does not help.

## 2024-03-26 NOTE — TELEPHONE ENCOUNTER
Patient calls stating Xifaxan copay is $700.  She would like to have something else sent to pharmacy please.

## 2024-04-02 ENCOUNTER — HOSPITAL ENCOUNTER (OUTPATIENT)
Facility: HOSPITAL | Age: 74
Discharge: HOME | End: 2024-04-02
Admitting: NURSE PRACTITIONER
Payer: MEDICARE

## 2024-04-02 DIAGNOSIS — R10.11 RIGHT UPPER QUADRANT ABDOMINAL PAIN: ICD-10-CM

## 2024-04-02 PROCEDURE — 76705 ECHO EXAM OF ABDOMEN: CPT

## 2024-04-09 RX ORDER — DICYCLOMINE HYDROCHLORIDE 10 MG/1
10 CAPSULE ORAL 3 TIMES DAILY PRN
Qty: 90 CAPSULE | Refills: 0 | Status: SHIPPED | OUTPATIENT
Start: 2024-04-09

## 2024-04-11 ENCOUNTER — TELEPHONE (OUTPATIENT)
Dept: GASTROENTEROLOGY | Facility: CLINIC | Age: 74
End: 2024-04-11
Payer: MEDICARE

## 2024-04-11 DIAGNOSIS — R19.7 DIARRHEA, UNSPECIFIED TYPE: ICD-10-CM

## 2024-04-11 DIAGNOSIS — R10.11 RIGHT UPPER QUADRANT ABDOMINAL PAIN: Primary | ICD-10-CM

## 2024-04-11 NOTE — TELEPHONE ENCOUNTER
"  Hub staff attempted to follow warm transfer process and was unsuccessful     Caller: Pearl Christiansen \"Ramiro\"    Relationship to patient: Self    Best call back number: [phone]    Patient is needing: PATIENT IS CALLING TO REPORT THAT SHE IS HAVING PAIN IN ABDOMEN THAT IS VERY SEVERE.  WOULD LIKE TO GET APPOINTMENT FOR TOMORROW.  NEEDS TO SPEAK WITH SOMEONE.  THANK YOU   "

## 2024-04-11 NOTE — TELEPHONE ENCOUNTER
Pt has been in pain for 1 week already. Pain is located in RUQ area, irradiated ipsilateral and notoriously below right shoulder blade. Constant and can go from 4/10 as it is right now to 9/10. Physical contact with affected area (by hand or laying down) can increase the pain. Not related to food intake or BMs.  Also reports increased gas.  Denies fever, N/V or any bleeding.  Tylenol provides little relief.

## 2024-04-11 NOTE — TELEPHONE ENCOUNTER
"  Caller: Pearl Christiansen \"Ramiro\"    Relationship: Self    Best call back number: [phone] (Mobile)    What is the best time to reach you: ANYTIME    Who are you requesting to speak with (clinical staff, provider,  specific staff member): CLINICAL    Do you know the name of the person who called: SAGRARIO MACHADO    What was the call regarding: RESPONSE FROM APRN    Is it okay if the provider responds through MyChart: NA      "

## 2024-04-11 NOTE — TELEPHONE ENCOUNTER
I would recommend blood work and a CT scan for further evaluation.  I placed order.  She may continue to use dicyclomine as needed for abdominal pain.

## 2024-04-12 ENCOUNTER — TELEPHONE (OUTPATIENT)
Dept: GASTROENTEROLOGY | Facility: CLINIC | Age: 74
End: 2024-04-12

## 2024-04-12 DIAGNOSIS — R19.7 DIARRHEA, UNSPECIFIED TYPE: ICD-10-CM

## 2024-04-12 DIAGNOSIS — R10.11 RIGHT UPPER QUADRANT ABDOMINAL PAIN: Primary | ICD-10-CM

## 2024-04-12 NOTE — TELEPHONE ENCOUNTER
Spoke w/pt and clarified.   Pt was told that the earliest appointment for her CT will be in May. PT would like to know what alternative we might have to do the test earlier.

## 2024-04-12 NOTE — TELEPHONE ENCOUNTER
"Caller: Pearl Christiansen \"Ramiro\"    Relationship to patient: Self    Best call back number: [phone]    Patient is needing: PT CALLED WITH SOME QUESTIONS. SHE SAID SHE SPOKE TO SOMEONE YESTERDAY TO SCHEDULE HER CT SCAN. SHE SAID SHE WAS STRUGGLING TO HEAR THEM A LITTLE BIT AND WASN'T SURE WHY SHE WAS GETTING A CT SCAN OF THE PELVIS WHEN HER PAIN IS IN HER RIB CAGE. SHE JUST NEEDS SOME CLARIFICATION ON THAT. PT ALSO WANTED TO KNOW IF SHE NEEDED TO FAST FOR HER BLOOD WORK OR NOT, SHE'D LIKE TO GO GET IT DONE TODAY. PLEASE ADVISE.      "

## 2024-04-15 ENCOUNTER — HOSPITAL ENCOUNTER (OUTPATIENT)
Dept: CT IMAGING | Facility: HOSPITAL | Age: 74
Discharge: HOME | End: 2024-04-15
Admitting: NURSE PRACTITIONER
Payer: MEDICARE

## 2024-04-15 ENCOUNTER — TELEPHONE (OUTPATIENT)
Dept: FAMILY MEDICINE CLINIC | Facility: CLINIC | Age: 74
End: 2024-04-15
Payer: MEDICARE

## 2024-04-15 DIAGNOSIS — E11.65 TYPE 2 DIABETES MELLITUS WITH HYPERGLYCEMIA, WITHOUT LONG-TERM CURRENT USE OF INSULIN: ICD-10-CM

## 2024-04-15 DIAGNOSIS — R10.11 RIGHT UPPER QUADRANT ABDOMINAL PAIN: ICD-10-CM

## 2024-04-15 DIAGNOSIS — R19.7 DIARRHEA, UNSPECIFIED TYPE: ICD-10-CM

## 2024-04-15 DIAGNOSIS — E02 SUBCLINICAL IODINE-DEFICIENCY HYPOTHYROIDISM: ICD-10-CM

## 2024-04-15 DIAGNOSIS — E78.00 PURE HYPERCHOLESTEROLEMIA: ICD-10-CM

## 2024-04-15 DIAGNOSIS — E11.65 TYPE 2 DIABETES MELLITUS WITH HYPERGLYCEMIA, WITHOUT LONG-TERM CURRENT USE OF INSULIN: Primary | ICD-10-CM

## 2024-04-15 LAB
CREAT BLDA-MCNC: 0.9 MG/DL (ref 0.6–1.3)
EGFRCR SERPLBLD CKD-EPI 2021: 68.5 ML/MIN/1.73

## 2024-04-15 PROCEDURE — 74177 CT ABD & PELVIS W/CONTRAST: CPT

## 2024-04-15 PROCEDURE — 25510000001 IOPAMIDOL PER 1 ML: Performed by: NURSE PRACTITIONER

## 2024-04-15 PROCEDURE — 82565 ASSAY OF CREATININE: CPT

## 2024-04-15 RX ADMIN — IOPAMIDOL 100 ML: 755 INJECTION, SOLUTION INTRAVENOUS at 14:21

## 2024-04-16 LAB
ALBUMIN/CREAT UR: 21 MG/G CREAT (ref 0–29)
CHOLEST SERPL-MCNC: 132 MG/DL (ref 0–200)
CREAT UR-MCNC: 147 MG/DL
HBA1C MFR BLD: 9.1 % (ref 4.8–5.6)
HDLC SERPL-MCNC: 37 MG/DL (ref 40–60)
LDLC SERPL CALC-MCNC: 48 MG/DL (ref 0–100)
LDLC/HDLC SERPL: 0.89 {RATIO}
MICROALBUMIN UR-MCNC: 30.4 UG/ML
T4 FREE SERPL-MCNC: 0.99 NG/DL (ref 0.93–1.7)
T4 SERPL-MCNC: 8.47 MCG/DL (ref 4.5–11.7)
TRIGL SERPL-MCNC: 310 MG/DL (ref 0–150)
VLDLC SERPL CALC-MCNC: 47 MG/DL (ref 5–40)

## 2024-04-17 DIAGNOSIS — E11.65 TYPE 2 DIABETES MELLITUS WITH HYPERGLYCEMIA, WITHOUT LONG-TERM CURRENT USE OF INSULIN: Primary | ICD-10-CM

## 2024-04-18 ENCOUNTER — OFFICE VISIT (OUTPATIENT)
Dept: FAMILY MEDICINE CLINIC | Facility: CLINIC | Age: 74
End: 2024-04-18
Payer: MEDICARE

## 2024-04-18 VITALS
OXYGEN SATURATION: 96 % | DIASTOLIC BLOOD PRESSURE: 64 MMHG | HEART RATE: 89 BPM | BODY MASS INDEX: 32.59 KG/M2 | HEIGHT: 62 IN | TEMPERATURE: 97.2 F | SYSTOLIC BLOOD PRESSURE: 122 MMHG | WEIGHT: 177.1 LBS

## 2024-04-18 DIAGNOSIS — E78.00 PURE HYPERCHOLESTEROLEMIA: ICD-10-CM

## 2024-04-18 DIAGNOSIS — G47.33 OSA (OBSTRUCTIVE SLEEP APNEA): ICD-10-CM

## 2024-04-18 DIAGNOSIS — D64.9 ANEMIA, UNSPECIFIED TYPE: ICD-10-CM

## 2024-04-18 DIAGNOSIS — E11.65 TYPE 2 DIABETES MELLITUS WITH HYPERGLYCEMIA, WITHOUT LONG-TERM CURRENT USE OF INSULIN: ICD-10-CM

## 2024-04-18 DIAGNOSIS — E03.8 OTHER SPECIFIED HYPOTHYROIDISM: ICD-10-CM

## 2024-04-18 DIAGNOSIS — M54.9 MID BACK PAIN ON RIGHT SIDE: Primary | ICD-10-CM

## 2024-04-18 DIAGNOSIS — Z86.59 HISTORY OF CLAUSTROPHOBIA: ICD-10-CM

## 2024-04-18 PROCEDURE — G0439 PPPS, SUBSEQ VISIT: HCPCS | Performed by: INTERNAL MEDICINE

## 2024-04-18 PROCEDURE — 99214 OFFICE O/P EST MOD 30 MIN: CPT | Performed by: INTERNAL MEDICINE

## 2024-04-18 PROCEDURE — 1160F RVW MEDS BY RX/DR IN RCRD: CPT | Performed by: INTERNAL MEDICINE

## 2024-04-18 PROCEDURE — 3046F HEMOGLOBIN A1C LEVEL >9.0%: CPT | Performed by: INTERNAL MEDICINE

## 2024-04-18 PROCEDURE — 1159F MED LIST DOCD IN RCRD: CPT | Performed by: INTERNAL MEDICINE

## 2024-04-18 RX ORDER — ESCITALOPRAM OXALATE 20 MG/1
20 TABLET ORAL DAILY
Qty: 90 TABLET | Refills: 1 | Status: SHIPPED | OUTPATIENT
Start: 2024-04-18

## 2024-04-18 NOTE — PROGRESS NOTES
The ABCs of the Annual Wellness Visit  Subsequent Medicare Wellness Visit    Subjective    Pearl Christiansen is a 73 y.o. female who presents for a Subsequent Medicare Wellness Visit.    The following portions of the patient's history were reviewed and   updated as appropriate: allergies, current medications, past family history, past medical history, past social history, past surgical history, and problem list.    Compared to one year ago, the patient feels her physical   health is worse.    Compared to one year ago, the patient feels her mental   health is worse.    Recent Hospitalizations:  She was not admitted to the hospital during the last year.       Current Medical Providers:  Patient Care Team:  Jesi Estes MD as PCP - General (Internal Medicine)  Suha Vogt APRN as Nurse Practitioner (Nurse Practitioner)  Jesi Estes MD as Referring Physician (Internal Medicine)  Anton Saleh MD as Consulting Physician (Hematology and Oncology)    Outpatient Medications Prior to Visit   Medication Sig Dispense Refill    Acetaminophen (TYLENOL PO) Take 650 mg by mouth 2 (Two) Times a Day As Needed.      amLODIPine (NORVASC) 5 MG tablet Take 1 tablet by mouth once daily 90 tablet 0    aspirin 81 MG EC tablet Take 1 tablet by mouth Daily.      atorvastatin (LIPITOR) 40 MG tablet Take 1 tablet by mouth Every Night. 90 tablet 3    carvedilol (COREG) 3.125 MG tablet Take 1 tablet by mouth 2 (Two) Times a Day. 60 tablet 11    clobetasol (TEMOVATE) 0.05 % cream Apply 1 application topically to the appropriate area as directed every night at bedtime. 45 g 0    colestipol (COLESTID) 1 g tablet Take 1 tablet by mouth 2 (Two) Times a Day. If no improvement in diarrhea, increase to 2 pills twice daily. 120 tablet 5    Diclofenac Sodium (VOLTAREN) 1 % gel gel Apply 4 g topically to the appropriate area as directed 4 (Four) Times a Day.      dicyclomine (BENTYL) 10 MG capsule Take 1 capsule by mouth three times  daily as needed 90 capsule 0    esomeprazole (nexIUM) 40 MG capsule Take 1 capsule by mouth Every Morning Before Breakfast. 90 capsule 3    famotidine (PEPCID) 40 MG tablet Take 1 tablet by mouth At Night As Needed for Heartburn. 30 tablet 5    gabapentin (NEURONTIN) 300 MG capsule TAKE 1 CAPSULE BY MOUTH THREE TIMES DAILY 270 capsule 0    Ibuprofen 3 %, Gabapentin 10 %, Baclofen 2 %, lidocaine 4 %, Ketamine HCl 4 % Apply 1-2 g topically to the appropriate area as directed 3 (Three) to 4 (Four) times daily. 90 g 5    levothyroxine (SYNTHROID, LEVOTHROID) 50 MCG tablet Take 1 tablet by mouth once daily 90 tablet 0    metFORMIN ER (GLUCOPHAGE-XR) 500 MG 24 hr tablet TAKE 1 TABLET BY MOUTH TWICE DAILY BEFORE MEAL(S) 180 tablet 0    methylPREDNISolone (MEDROL) 4 MG dose pack       nystatin (MYCOSTATIN) 111380 UNIT/GM ointment Apply 1 application topically to the appropriate area as directed 2 (Two) Times a Day. 30 g 0    tiZANidine (ZANAFLEX) 2 MG tablet Take 1 or 2 tablets PO QHS tolerated 60 tablet 1    escitalopram (LEXAPRO) 10 MG tablet Take 1 tablet by mouth once daily 90 tablet 0    Januvia 50 MG tablet Take 1 tablet by mouth once daily 90 tablet 0     No facility-administered medications prior to visit.       No opioid medication identified on active medication list. I have reviewed chart for other potential  high risk medication/s and harmful drug interactions in the elderly.        Aspirin is on active medication list. Aspirin use is indicated based on review of current medical condition/s. Pros and cons of this therapy have been discussed today. Benefits of this medication outweigh potential harm.  Patient has been encouraged to continue taking this medication.  .      Patient Active Problem List   Diagnosis    Lichen sclerosus of vulva    Hypothyroidism    Type 2 diabetes mellitus with hyperglycemia, without long-term current use of insulin    Gastroesophageal reflux disease without esophagitis     "Hyperlipidemia    Chronic left hip pain    Primary osteoarthritis of left hip    Anemia    Sacroiliitis     Advance Care Planning   Advance Care Planning     Advance Directive is not on file.  ACP discussion was held with the patient during this visit. Patient does not have an advance directive, declines further assistance.     Objective    Vitals:    24 0930   BP: 122/64   BP Location: Left arm   Patient Position: Sitting   Cuff Size: Adult   Pulse: 89   Temp: 97.2 °F (36.2 °C)   SpO2: 96%   Weight: 80.3 kg (177 lb 1.6 oz)   Height: 157.5 cm (62\")     Estimated body mass index is 32.39 kg/m² as calculated from the following:    Height as of this encounter: 157.5 cm (62\").    Weight as of this encounter: 80.3 kg (177 lb 1.6 oz).           Does the patient have evidence of cognitive impairment? No    Lab Results   Component Value Date    CHLPL 132 04/15/2024    TRIG 310 (H) 04/15/2024    HDL 37 (L) 04/15/2024    LDL 48 04/15/2024    VLDL 47 (H) 04/15/2024    HGBA1C 9.10 (H) 04/15/2024        HEALTH RISK ASSESSMENT    Smoking Status:  Social History     Tobacco Use   Smoking Status Former    Current packs/day: 0.00    Average packs/day: 1 pack/day for 30.0 years (30.0 ttl pk-yrs)    Types: Cigarettes    Start date: 1965    Quit date: 1991    Years since quittin.8    Passive exposure: Past   Smokeless Tobacco Never   Tobacco Comments    quit in her 40's     Alcohol Consumption:  Social History     Substance and Sexual Activity   Alcohol Use Not Currently    Comment: 1 drink every blue moon     Fall Risk Screen:    STEADI Fall Risk Assessment was completed, and patient is at MODERATE risk for falls. Assessment completed on:2024    Depression Screenin/18/2024     9:25 AM   PHQ-2/PHQ-9 Depression Screening   Little Interest or Pleasure in Doing Things 2-->more than half the days   Feeling Down, Depressed or Hopeless 2-->more than half the days   Trouble Falling or Staying Asleep, or " Sleeping Too Much 0-->not at all   Feeling Tired or Having Little Energy 3-->nearly every day   Poor Appetite or Overeating 1-->several days   Feeling Bad about Yourself - or that You are a Failure or Have Let Yourself or Your Family Down 0-->not at all   Trouble Concentrating on Things, Such as Reading the Newspaper or Watching Television 2-->more than half the days   Moving or Speaking So Slowly that Other People Could Have Noticed? Or the Opposite - Being So Fidgety 0-->not at all   Thoughts that You Would be Better Off Dead or of Hurting Yourself in Some Way 0-->not at all   PHQ-9: Brief Depression Severity Measure Score 10   If You Checked Off Any Problems, How Difficult Have These Problems Made It For You to Do Your Work, Take Care of Things at Home, or Get Along with Other People? somewhat difficult       Health Habits and Functional and Cognitive Screenin/18/2024     9:28 AM   Functional & Cognitive Status   Do you have difficulty preparing food and eating? No   Do you have difficulty bathing yourself, getting dressed or grooming yourself? No   Do you have difficulty using the toilet? No   Do you have difficulty moving around from place to place? No   Do you have trouble with steps or getting out of a bed or a chair? No   Current Diet Unhealthy Diet   Dental Exam Up to date   Eye Exam Up to date   Exercise (times per week) 0 times per week   Current Exercises Include No Regular Exercise   Do you need help using the phone?  No   Are you deaf or do you have serious difficulty hearing?  No   Do you need help to go to places out of walking distance? No   Do you need help shopping? No   Do you need help preparing meals?  No   Do you need help with housework?  No   Do you need help with laundry? No   Do you need help taking your medications? No   Do you need help managing money? No   Do you ever drive or ride in a car without wearing a seat belt? No   Have you felt unusual stress, anger or loneliness in  the last month? Yes   Who do you live with? Spouse   If you need help, do you have trouble finding someone available to you? No   Have you been bothered in the last four weeks by sexual problems? No   Do you have difficulty concentrating, remembering or making decisions? Yes       Age-appropriate Screening Schedule:  Refer to the list below for future screening recommendations based on patient's age, sex and/or medical conditions. Orders for these recommended tests are listed in the plan section. The patient has been provided with a written plan.    Health Maintenance   Topic Date Due    ZOSTER VACCINE (1 of 2) Never done    DXA SCAN  03/08/2020    HEPATITIS C SCREENING  Never done    COVID-19 Vaccine (3 - 2023-24 season) 07/08/2024 (Originally 9/1/2023)    RSV Vaccine - Adults (1 - 1-dose 60+ series) 04/18/2025 (Originally 9/16/2012)    INFLUENZA VACCINE  08/01/2024    MAMMOGRAM  09/09/2024    BMI FOLLOWUP  10/11/2024    HEMOGLOBIN A1C  10/15/2024    DIABETIC EYE EXAM  02/14/2025    LIPID PANEL  04/15/2025    URINE MICROALBUMIN  04/15/2025    ANNUAL WELLNESS VISIT  04/18/2025    DIABETIC FOOT EXAM  04/18/2025    TDAP/TD VACCINES (2 - Td or Tdap) 07/17/2025    COLORECTAL CANCER SCREENING  11/05/2031    Pneumococcal Vaccine 65+  Completed                  CMS Preventative Services Quick Reference  Risk Factors Identified During Encounter  Chronic Pain: Natural history and expected course discussed. Questions answered.  Depression/Dysphoria: Current medication adjusted.  Follow up visit planned.  Inactivity/Sedentary: Patient was advised to exercise at least 150 minutes a week per CDC recommendations.  The above risks/problems have been discussed with the patient.  Pertinent information has been shared with the patient in the After Visit Summary.  An After Visit Summary and PPPS were made available to the patient.    Follow Up:   Next Medicare Wellness visit to be scheduled in 1 year.       Additional E&M Note during  "same encounter follows:  Patient has multiple medical problems which are significant and separately identifiable that require additional work above and beyond the Medicare Wellness Visit.      Chief Complaint  Medicare Wellness-subsequent (Physical Exam )    Subjective        HPI  Pearl Christiansen is also being seen today for AWV and f/u on RUQ/flank pain that started 2 -3 weeks ago.  No rash.  Has had CT and RUQ u/s both of which did not show acute findings.  The area was very tender touch in flank on right and the area under right breast was tingling and itchy.  She thinks it is better over the past week.  She is 70% better and it is not as constant and the back is not as sensitive to touch.  GI started colestipol for IBS-D but that can also help cholesterol  she is also on 40 mg qd of lipitor.  NIDDM is poorly controlled and she is having hard time with dietary restrictions.   She is on januvia 50 mg qd and metformin  mg bid.  Normal kidney function.  Can't take more metformin due to uncontrollable diarrhea   no h/o pancreatitis nor thyroid cancer in her or family  would like to lose weight    struggles with short term memory loss and she feels more hopeless and more sad.  She takes Lexapro 10 mg qd.   Is not in a support group or seeing therapist at this time.    Uses CPAP for ALEXANDER but hasn't seen provider in a while        CT Abdomen Pelvis With Contrast (04/15/2024 14:17)   Objective   Vital Signs:  /64 (BP Location: Left arm, Patient Position: Sitting, Cuff Size: Adult)   Pulse 89   Temp 97.2 °F (36.2 °C)   Ht 157.5 cm (62\")   Wt 80.3 kg (177 lb 1.6 oz)   SpO2 96%   BMI 32.39 kg/m²     Physical Exam  Vitals and nursing note reviewed.   Constitutional:       Appearance: Normal appearance. She is well-developed.   HENT:      Head: Normocephalic and atraumatic.      Right Ear: External ear normal.      Left Ear: External ear normal.   Eyes:      Extraocular Movements: Extraocular movements " intact.      Conjunctiva/sclera: Conjunctivae normal.   Neck:      Vascular: No carotid bruit.   Cardiovascular:      Rate and Rhythm: Normal rate and regular rhythm.      Heart sounds: Normal heart sounds.      Comments: No bruits  Pulmonary:      Effort: Pulmonary effort is normal. No respiratory distress.      Breath sounds: Normal breath sounds. No stridor. No wheezing, rhonchi or rales.   Chest:      Chest wall: No tenderness.   Abdominal:      General: Bowel sounds are normal. There is no distension.      Palpations: Abdomen is soft. There is no mass.      Tenderness: There is no abdominal tenderness. There is no guarding or rebound.      Hernia: No hernia is present.   Musculoskeletal:      Cervical back: Neck supple.      Right lower leg: No edema.      Left lower leg: No edema.   Lymphadenopathy:      Cervical: No cervical adenopathy.   Skin:     General: Skin is warm.   Neurological:      General: No focal deficit present.      Mental Status: She is alert and oriented to person, place, and time. Mental status is at baseline.   Psychiatric:         Mood and Affect: Mood normal.         Behavior: Behavior normal.         Thought Content: Thought content normal.         Judgment: Judgment normal.                         Assessment and Plan   Diagnoses and all orders for this visit:    1. Mid back pain on right side (Primary)  -     MRI thoracic spine wo contrast; Future    2. Type 2 diabetes mellitus with hyperglycemia, without long-term current use of insulin    3. Pure hypercholesterolemia    4. Anemia, unspecified type    5. Other specified hypothyroidism    6. ALEXANDER (obstructive sleep apnea)  -     Ambulatory Referral to Sleep Medicine    7. History of claustrophobia  -     diazePAM (Valium) 5 MG tablet; Take 1 tablet by mouth 2 (Two) Times a Day As Needed for Anxiety (for MRI).  Dispense: 2 tablet; Refill: 0    Other orders  -     escitalopram (LEXAPRO) 20 MG tablet; Take 1 tablet by mouth Daily.   Dispense: 90 tablet; Refill: 1  -     SITagliptin (Januvia) 100 MG tablet; Take 1 tablet by mouth Daily.  Dispense: 90 tablet; Refill: 1    Right side/flank pain may be coming from her back.  Considered non rash shingles as well.  She is improving but will get MRI of back. Valium for the MRI  She is going to call her insurance to see of mounjaro or ozempic are covered and if they are, will d/c januvia and start one of those   for now, I have increased januvia to 100 mg qd and will cont metformin bid.  Dietary restrictions are needed  Have encouraged her to see a therapist or join a support group for caregivers of dementia patients.  She will call her [employer] and also call her insurance company.  Will increase lexapro from 10 mg to 20 mg as well  Have ref her to sleep med due to ALEXANDER and needs CPAP checked and monitored.          Follow Up   No follow-ups on file.  Patient was given instructions and counseling regarding her condition or for health maintenance advice. Please see specific information pulled into the AVS if appropriate.

## 2024-04-20 RX ORDER — DIAZEPAM 5 MG/1
5 TABLET ORAL 2 TIMES DAILY PRN
Qty: 2 TABLET | Refills: 0 | Status: SHIPPED | OUTPATIENT
Start: 2024-04-20

## 2024-04-22 ENCOUNTER — TELEPHONE (OUTPATIENT)
Dept: GASTROENTEROLOGY | Facility: CLINIC | Age: 74
End: 2024-04-22
Payer: MEDICARE

## 2024-04-22 RX ORDER — CARVEDILOL 3.12 MG/1
3.12 TABLET ORAL 2 TIMES DAILY
Qty: 60 TABLET | Refills: 0 | Status: SHIPPED | OUTPATIENT
Start: 2024-04-22

## 2024-05-02 ENCOUNTER — OFFICE VISIT (OUTPATIENT)
Dept: CARDIOLOGY | Facility: CLINIC | Age: 74
End: 2024-05-02
Payer: MEDICARE

## 2024-05-02 VITALS
HEIGHT: 62 IN | HEART RATE: 73 BPM | BODY MASS INDEX: 32.57 KG/M2 | WEIGHT: 177 LBS | DIASTOLIC BLOOD PRESSURE: 80 MMHG | SYSTOLIC BLOOD PRESSURE: 126 MMHG

## 2024-05-02 DIAGNOSIS — E78.1 HYPERTRIGLYCERIDEMIA: ICD-10-CM

## 2024-05-02 DIAGNOSIS — R47.89 WORD FINDING DIFFICULTY: ICD-10-CM

## 2024-05-02 DIAGNOSIS — I73.9 PVD (PERIPHERAL VASCULAR DISEASE) WITH CLAUDICATION: ICD-10-CM

## 2024-05-02 DIAGNOSIS — E78.5 HYPERLIPIDEMIA LDL GOAL <70: Primary | ICD-10-CM

## 2024-05-02 DIAGNOSIS — E11.65 TYPE 2 DIABETES MELLITUS WITH HYPERGLYCEMIA, WITHOUT LONG-TERM CURRENT USE OF INSULIN: ICD-10-CM

## 2024-05-02 DIAGNOSIS — I25.10 NONOBSTRUCTIVE ATHEROSCLEROSIS OF CORONARY ARTERY: ICD-10-CM

## 2024-05-02 RX ORDER — CETIRIZINE HYDROCHLORIDE 5 MG/1
5 TABLET ORAL DAILY
COMMUNITY

## 2024-05-02 RX ORDER — ICOSAPENT ETHYL 1 G/1
2 CAPSULE ORAL 2 TIMES DAILY WITH MEALS
Qty: 120 CAPSULE | Refills: 11 | Status: SHIPPED | OUTPATIENT
Start: 2024-05-02 | End: 2025-05-02

## 2024-05-02 RX ORDER — ATORVASTATIN CALCIUM 20 MG/1
20 TABLET, FILM COATED ORAL NIGHTLY
Qty: 90 TABLET | Refills: 3 | Status: SHIPPED | OUTPATIENT
Start: 2024-05-02 | End: 2025-05-02

## 2024-05-02 NOTE — PROGRESS NOTES
[facility]    Subjective:     Encounter Date:05/02/24      Patient ID: Pearl Christiansen is a 73 y.o. female.    Chief Complaint:   Chief Complaint   Patient presents with    Hyperlipidemia    Palpitations    Follow-up palpitations  History of Present Illness    Ms. Garcia is a pleasant 73-year-old lady past medical history hypertension, diabetes, nonobstructive coronary artery disease, sleep apnea on CPAP, peripheral vascular disease with left lower extremity claudication who presents for follow-up    She previously followed with Dr. Hutson in our office.  She has had a episodes of epigastric discomfort which have been on and off over the last several years.  Due to her nonobstructive CAD, she was evaluated for cardiac cause.  She was admitted to [facility] for this and she underwent a Lexiscan nuclear stress test which showed a possible apical infarct but no ischemia.  It was thought to be GI origin.    She has been investigated for palpitations in the past and has had Holter's that show intermittent PACs, but nothing more significant.  Palpitations are ongoing.    She reports her sympt she continues to have intermittent palpitation episodes where she will have a few minutes to hours of palpitations where she feels her heart beat prominently, then goes away.  She also has some reproducible pain in the lower portion of her breast in the middle portion of her chest that reproduces with palpation.      She continues graded exercise to help with peripheral vascular claudication.  Her PCP recently started on colestipol due to hyperlipidemia.    Cardiac Procedures:  Left cardiac catheterization in [place] 8/14/2017.  Left main was normal.  LAD 50% mid disease.  Circumflex had luminal irregularities.  RCA was dominant with luminal irregularities.  CTA chest at [facility] 10/5/2021.  No PE or aortic pathology noted.  Myocardial perfusion stress test 10/6/2021.  Small apical defect  ASSESSMENT/PLAN:  1. Fatigue  This is relatively complicated and likely related to sleep.  I see no physiologic reason for her fatigue at this time or anything is reversible.  I made some suggestions please see below.    - Basic Metabolic Panel  - HM2(CBC w/o Differential)    2. Weight loss  Sounds like this is more relating to poor p.o. intake and encouraged by eating appropriately she may have more energy more muscle mass in availability.  We are going to try to have her eat more at lunch.  Is also confusing about whether or not she wants to be able to make her own food or not.  We will see how things look in 2 months when she returns.    3. Aortic stenosis mild 1/15 ECHO  Need to consider whether or not we need to repeat echo at some point to follow this stenosis.  Certainly her murmurs significant at this time.    4. Hypothyroid  On appropriate replacement.  We will plan on checking TSH next visit    5. Anemia of chronic kidney failure, stage 3 (moderate)  Her anemia is rather significant.  Please see my previous notes addressing this.  We will check and make certain that her hemoglobin is dropped significantly further.    This is really a complicated case relating to the patient's age some of her problems input from family from patient potential problems issues etc.    Patient Instructions   If you are unable to fall asleep after 15-20 minutes, get out of bed and sit in a dimly lit room doing a quiet activity. Once you are sleepy again, go back to bed.     If your nighttimes are bothering you, avoid daytime napping .     Encourage you to have some food at lunch time.       Return in about 3 months (around 7/24/2017) for Annual physical.    CHIEF COMPLAINT:  Chief Complaint   Patient presents with     Fatigue     Wt.Loss     eats breakfast, and dinner no lunch or snacking     Back Pain       HISTORY OF PRESENT ILLNESS:  Wendy Rock is a 89 y.o. female presenting to the clinic today for fatigue and weight  consistent with artifact.  No ischemia.  Echocardiogram 11/24/2021.  LVEF 61-65%.  Mild hypertrophy.  Grade 1 diastolic dysfunction.  Normal RV cavity size and systolic function.  No evidence of pericardial effusion.  24-hour monitor 11/30/2021.  Normal monitor study.  Patient had total of 7 PACs.  ZIO monitor 1/30/2022.  Underlying rhythm was sinus with average heart rate 85.  SVT ectopy less than 1%  Echocardiogram May 10, 2023: Normal LVEF 57.8%.  Grade 1 diastolic dysfunction.  Otherwise unremarkable.  Echo May 10, 2023:  LVEF 58%, grade 1 diastolic dysfunction, otherwise normal    The following portions of the patient's history were reviewed and updated as appropriate: allergies, current medications, past family history, past medical history, past social history, past surgical history and problem list.    Past Medical History:   Diagnosis Date    Allergic     Anemia     Anxiety     Arthritis     Arthritis of back     Arthritis of neck     CAD (coronary artery disease)     50% mid LAD by cath on 8/14/2017    Chronic pain disorder     Depression     Diabetes mellitus     Diverticulosis     Fatty liver 2018    GERD (gastroesophageal reflux disease)     H/O Anal fissure     Headache     Headache, tension-type     Hip arthrosis     Hyperlipidemia     Hypertension     Hypothyroidism     Irritable bowel syndrome     Knee swelling     Low back pain     Low back strain     Neuropathy in diabetes     Obesity     ALEXANDER (obstructive sleep apnea)     PAD (peripheral artery disease)     Stress fracture        Past Surgical History:   Procedure Laterality Date    ADENOIDECTOMY      CARDIAC CATHETERIZATION Left 08/14/2017    [facility] [employer]    CHOLECYSTECTOMY      COLONOSCOPY  2015    Diverticulosis, hemorrhoids    COLONOSCOPY W/ POLYPECTOMY  2020    ENDOSCOPY  2016    FOOT SURGERY      FRACTURE SURGERY      Stress fracture about 4 yrs ago    GALLBLADDER SURGERY  1991    HAND SURGERY      RECTOVAGINAL FISTULA REPAIR      SACROILIAC  loss. She is accompanied by her daughter to the clinic today. She states that she is tired all of the time. She goes to bed around 11 PM and will wake up around 3-4 AM. She will lay in her bed for about an hour until she gets out of bed. Then she will get dressed and watch a little news. Her most recent TSH was 2.86 as of 6/15/16. She mentions that when she wakes up around 3 AM she still feels tired. She states that she has been like this all of her life. She will complain of needing a nap during the morning hours.     Weight Loss: She will wake up and eat a big breakfast. She does not eat lunch because she eats dinner around 4:30 PM. She has lost around 9 lbs since July of last year. She states that she does not eat a lot depending on how good the food looks. She does not like the food there and that is part of why she does not like to eat it. She has thought about doing her own cooking.     Back Pain: She has been working on walking stairs daily. She will do a staircase twice. She was receiving help from her son in doing this. Once she started doing this her back began giving her troubles again. Her back has been causing her pain in the low back. She denies any radiating pain into her legs. She does not take anything regularly for the pain but will take an aspirin on occasion.     Situational Sadness: She is currently taking 2.5 mg of Lexapro once daily. It has been noticed that she experienced side effects of fatigue when she is on a higher dose. She has been on 10 mg in the past after her  . She was taken off of the medication after some time because she does not like to be on medications. It was restarted at the 2.5 mg dose to see if it was effective for her. Her daughter mentions that she will talk to her children about how unhappy she is every day. She states that she does not like living at the senior home and believes that she could live independently. She mentions that she feels like she took  "JOINT INJECTION Left 01/31/2024    Procedure: SACROILIAC JOINT INJECTION LEFT 24240;  Surgeon: Rhona Hammer MD;  Location: [facility];  Service: Pain Management;  Laterality: Left;    STEROID INJECTION HIP Left 11/21/2022    Procedure: HIP INJECTION UNDER FLUORO - left;  Surgeon: Rhona Hammer MD;  Location: [facility];  Service: Pain Management;  Laterality: Left;    TONSILLECTOMY      TRIGGER FINGER RELEASE      TRIGGER POINT INJECTION      VULVA BIOPSY             ECG 12 Lead    Date/Time: 5/2/2024 10:55 AM  Performed by: Harvinder Ramon MD    Authorized by: Harvinder Ramon MD  Comparison: compared with previous ECG from 4/27/2023  Similar to previous ECG  Rhythm: sinus rhythm  Rate: normal  Conduction: conduction normal  ST Segments: ST segments normal  T Waves: T waves normal  QRS axis: normal  Other findings: poor R wave progression    Clinical impression: non-specific ECG             Objective:     Vitals:    05/02/24 1029   BP: 126/80   Pulse: 73   Weight: 80.3 kg (177 lb)   Height: 157.5 cm (62\")           Constitutional:       Appearance: Healthy appearance. Not in distress.   Neck:      Vascular: JVD normal.   Pulmonary:      Effort: Pulmonary effort is normal.      Breath sounds: Normal breath sounds.   Cardiovascular:      PMI at left midclavicular line. Normal rate. Regular rhythm. Normal S2.       Murmurs: There is a grade 1/6 harsh midsystolic murmur at the URSB, radiating to the neck.      Comments: No carotid bruits detected  Pulses:     Intact distal pulses.   Edema:     Peripheral edema absent.   Skin:     General: Skin is warm and dry.   Neurological:      General: No focal deficit present.      Mental Status: Alert, oriented to person, place, and time and oriented to person, place and time.   Psychiatric:         Mood and Affect: Mood and affect normal.         Lab Review:     Lipid Panel          11/28/2023    10:32 4/15/2024    10:39   Lipid Panel   Total Cholesterol 149  132  " decisions from others instead of making her own. She states that there are opportunities to play BINGO at her living facility but she is not interested in doing that and does not go. She states that she would like to go home but knows she cannot do that; she states that she needs to change how she acts and what she says. Her daughter states that she has had the opportunity to cook in her own place for around 4 years but does not have the interest in doing so.     Health Maintenance: She received the Tdap and PPSV 23 vaccinations today.     REVIEW OF SYSTEMS:   Comprehensive review of systems negative except as noted above.    PFSH:  Reviewed as above.     TOBACCO USE:  History   Smoking Status     Former Smoker   Smokeless Tobacco     Never Used       VITALS:  Vitals:    04/24/17 1129   BP: 102/62   Pulse: 69   Weight: 113 lb 4.8 oz (51.4 kg)     Wt Readings from Last 3 Encounters:   04/24/17 113 lb 4.8 oz (51.4 kg)   07/08/16 122 lb (55.3 kg)   07/01/16 122 lb (55.3 kg)     Body mass index is 20.72 kg/(m^2).    PHYSICAL EXAM:  General Appearance: Alert, cooperative, no distress, appears stated age.  Back: Scoliosis.  She walks a little flex forward fashion and is has a loss of lumbar lordosis and unable to straighten up completely  Lungs: Clear to auscultation bilaterally, good air movement.  Heart: Regular rate and rhythm, S1 and S2 normal,  II-III/VI murmur.  Neurologic: CNII-XII intact, sensory grossly intact. Gait reasonably stable but certainly would be safer with walker.  She has one with her..   Psychiatric: She has a normal mood and affect.     ADDITIONAL HISTORY SUMMARIZED (FROM OLD RECORDS OR HISTORY FROM SOMEONE OTHER THAN THE PATIENT OR ANOTHER HEALTHCARE PROVIDER) (2 TOTAL): Reviewed e-mail from 4/22/17; notes from patient's son relating all concerns, memory, back pain, weight loss, fatigue.    DECISION TO OBTAIN EXTRA INFORMATION (OLD RECORDS REQUESTED OR HISTORY FROM ANOTHER PERSON OR ACCESSING CARE    Triglycerides 341  310    HDL Cholesterol 39  37    VLDL Cholesterol 53  47    LDL Cholesterol  57  48    LDL/HDL Ratio 1.07  0.89      BUN   Date Value Ref Range Status   04/13/2024 12 8 - 27 mg/dL Final   11/28/2023 14 8 - 23 mg/dL Final   10/06/2021 18 10 - 20 mg/dL Final     Creatinine   Date Value Ref Range Status   04/15/2024 0.90 0.60 - 1.30 mg/dL Final     Comment:     Serial Number: [id number]:  [id number]   10/06/2021 0.84 0.55 - 1.02 mg/dL Final   10/22/2019 1.02 0.57 - 1.11 mg/dL Final     Potassium   Date Value Ref Range Status   04/13/2024 4.6 3.5 - 5.2 mmol/L Final   11/28/2023 4.2 3.5 - 5.2 mmol/L Final   10/06/2021 4.4 3.5 - 5.1 mmol/L Final     ALT (SGPT)   Date Value Ref Range Status   04/13/2024 16 0 - 32 IU/L Final   11/28/2023 11 1 - 33 U/L Final   10/05/2021 14 0 - 55 U/L Final     AST (SGOT)   Date Value Ref Range Status   04/13/2024 19 0 - 40 IU/L Final   11/28/2023 15 1 - 32 U/L Final   10/05/2021 17 5 - 34 U/L Final         Performed        Assessment:          Diagnosis Plan   1. Hyperlipidemia LDL goal <70  ECG 12 Lead    icosapent ethyl (Vascepa) 1 g capsule capsule      2. Hypertriglyceridemia  icosapent ethyl (Vascepa) 1 g capsule capsule      3. Word finding difficulty  Duplex Carotid Ultrasound CAR      4. Type 2 diabetes mellitus with hyperglycemia, without long-term current use of insulin  Duplex Carotid Ultrasound CAR      5. PVD (peripheral vascular disease) with claudication  Duplex Carotid Ultrasound CAR      6. Nonobstructive atherosclerosis of coronary artery  Duplex Carotid Ultrasound CAR               Plan:         Coronary artery disease, nonobstructive: Possible apical infarct on SPECT at [facility], but this was not revealed on echocardiogram.  She gives no history of angina right now.  Probably apical thinning.  Cardiac cath 2017 showed 50% mid LAD lesion      Cardiac murmur.  Likely flow murmur.  Echo was unremarkable.  Palpitations, PACs  Continue carvedilol.  She  EVERYWHERE) (1 TOTAL): None.     RADIOLOGY TESTS SUMMARIZED OR ORDERED (XRAY/CT/MRI/DXA) (1 TOTAL): None.    LABS REVIEWED OR ORDERED (1 TOTAL): Reviewed labs from 6/10/16 and 7/1/16; TSH, vitamin B12, and HM1. Ordered BMP and HM2 labs today.     MEDICINE TESTS SUMMARIZED OR ORDERED (EKG/ECHO/COLONOSCOPY/EGD) (1 TOTAL): None.    INDEPENDENT REVIEW OF EKG OR X-RAY (2 EACH): None.       The visit lasted a total of 32 minutes face to face with the patient. Over 50% of the time was spent counseling and educating the patient about fatigue and weight loss.    ILigia, am scribing for and in the presence of, Dr. Herman.    I, Dr. Herman, personally performed the services described in this documentation, as scribed by Ligia Howell in my presence, and it is both accurate and complete.    MEDICATIONS:  Current Outpatient Prescriptions   Medication Sig Dispense Refill     albuterol (PROVENTIL HFA;VENTOLIN HFA) 90 mcg/actuation inhaler Inhale 2 puffs 3 (three) times a day as needed for wheezing. 1 Inhaler 0     cholecalciferol, vitamin D3, 5,000 unit Tab Take by mouth.       escitalopram oxalate (LEXAPRO) 5 MG tablet Take 0.5 tablets (2.5 mg total) by mouth daily. 45 tablet 3     ESTRACE 0.01 % (0.1 mg/gram) vaginal cream   1     levothyroxine (SYNTHROID, LEVOTHROID) 50 MCG tablet Take 1 tablet (50 mcg total) by mouth daily. 90 tablet 3     trimethoprim (TRIMPEX) 100 mg tablet TK 1 T PO HS  1     No current facility-administered medications for this visit.        Total data points: 3   reports mild improvement in her symptoms with this.    We have not captured any other significant arrhythmias besides occasional PACs.  She is going to get the [employer] soon given her fleeting episodes.  If she has more frequent episodes we can certainly repeat a Holter monitor since it has been 2 years.     Her fatigue does not seem to been affected by carvedilol.  Continue    Aspirin 81 daily  Peripheral vascular disease, hyperlipidemia: LDL controlled on most recent evaluation.  Continue Lipitor 20.  Her triglycerides remain elevated.  I discussed with patient about Vascepa, we will get this started, it may be expensive, which case we can just see how she does with the colestipol and atorvastatin 20, but not sure that would have much effect on her triglycerides.  I would prefer her to be on Vascepa given her vascular disease and hypertriglyceridemia    RTC 6 months.     Harvinder Ramon MD  [facility]  05/02/24  08:59 EDT       Current Outpatient Medications:     Acetaminophen (TYLENOL PO), Take 650 mg by mouth 2 (Two) Times a Day As Needed., Disp: , Rfl:     amLODIPine (NORVASC) 5 MG tablet, Take 1 tablet by mouth once daily, Disp: 90 tablet, Rfl: 0    carvedilol (COREG) 3.125 MG tablet, Take 1 tablet by mouth twice daily, Disp: 60 tablet, Rfl: 0    cetirizine (zyrTEC) 5 MG tablet, Take 1 tablet by mouth Daily., Disp: , Rfl:     clobetasol (TEMOVATE) 0.05 % cream, Apply 1 application topically to the appropriate area as directed every night at bedtime., Disp: 45 g, Rfl: 0    colestipol (COLESTID) 1 g tablet, Take 1 tablet by mouth 2 (Two) Times a Day. If no improvement in diarrhea, increase to 2 pills twice daily., Disp: 120 tablet, Rfl: 5    diazePAM (Valium) 5 MG tablet, Take 1 tablet by mouth 2 (Two) Times a Day As Needed for Anxiety (for MRI)., Disp: 2 tablet, Rfl: 0    dicyclomine (BENTYL) 10 MG capsule, Take 1 capsule by mouth three times daily as needed, Disp: 90 capsule, Rfl:  0    escitalopram (LEXAPRO) 20 MG tablet, Take 1 tablet by mouth Daily., Disp: 90 tablet, Rfl: 1    esomeprazole (nexIUM) 40 MG capsule, Take 1 capsule by mouth Every Morning Before Breakfast., Disp: 90 capsule, Rfl: 3    famotidine (PEPCID) 40 MG tablet, Take 1 tablet by mouth At Night As Needed for Heartburn., Disp: 30 tablet, Rfl: 5    gabapentin (NEURONTIN) 300 MG capsule, TAKE 1 CAPSULE BY MOUTH THREE TIMES DAILY, Disp: 270 capsule, Rfl: 0    levothyroxine (SYNTHROID, LEVOTHROID) 50 MCG tablet, Take 1 tablet by mouth once daily, Disp: 90 tablet, Rfl: 0    metFORMIN ER (GLUCOPHAGE-XR) 500 MG 24 hr tablet, TAKE 1 TABLET BY MOUTH TWICE DAILY BEFORE MEAL(S), Disp: 180 tablet, Rfl: 0    nystatin (MYCOSTATIN) 617078 UNIT/GM ointment, Apply 1 application topically to the appropriate area as directed 2 (Two) Times a Day., Disp: 30 g, Rfl: 0    SITagliptin (Januvia) 100 MG tablet, Take 1 tablet by mouth Daily., Disp: 90 tablet, Rfl: 1    tiZANidine (ZANAFLEX) 2 MG tablet, Take 1 or 2 tablets PO QHS tolerated, Disp: 60 tablet, Rfl: 1    aspirin 81 MG EC tablet, Take 1 tablet by mouth Daily. (Patient not taking: Reported on 5/2/2024), Disp: , Rfl:     atorvastatin (LIPITOR) 20 MG tablet, Take 1 tablet by mouth Every Night., Disp: 90 tablet, Rfl: 3    Diclofenac Sodium (VOLTAREN) 1 % gel gel, Apply 4 g topically to the appropriate area as directed 4 (Four) Times a Day., Disp: , Rfl:     Ibuprofen 3 %, Gabapentin 10 %, Baclofen 2 %, lidocaine 4 %, Ketamine HCl 4 %, Apply 1-2 g topically to the appropriate area as directed 3 (Three) to 4 (Four) times daily., Disp: 90 g, Rfl: 5    icosapent ethyl (Vascepa) 1 g capsule capsule, Take 2 g by mouth 2 (Two) Times a Day With Meals., Disp: 120 capsule, Rfl: 11    methylPREDNISolone (MEDROL) 4 MG dose pack, , Disp: , Rfl:          Return in about 6 months (around 11/2/2024).      Part of this note may be an electronic transcription/translation of spoken language to printed text using  the Dragon Dictation System.

## 2024-05-13 ENCOUNTER — TELEPHONE (OUTPATIENT)
Dept: FAMILY MEDICINE CLINIC | Facility: CLINIC | Age: 74
End: 2024-05-13
Payer: MEDICARE

## 2024-05-13 NOTE — TELEPHONE ENCOUNTER
Sena with [facility] is calling to inform provider that pts MRI was denied. Sena wanted to know if provider will do peer to peer for this of if pt appt would need to be cancelled. She request a callback when possible. She can be reached at [phone]

## 2024-05-15 ENCOUNTER — TELEPHONE (OUTPATIENT)
Dept: FAMILY MEDICINE CLINIC | Facility: CLINIC | Age: 74
End: 2024-05-15

## 2024-05-15 NOTE — TELEPHONE ENCOUNTER
Sierra from  called regarding pt MRI scheduled 5/18/24 it was denied. She wants to confirm if a call to insurance for approval will take place or cancel appt.     # [phone]

## 2024-05-15 NOTE — TELEPHONE ENCOUNTER
[facility] called wanting to know if the MRI that is scheduled on 5/18 needs to either be canceled? Does someone need to call insurance? Or go through with the appointment?

## 2024-05-16 NOTE — TELEPHONE ENCOUNTER
Called patient, patient states she's doing much better, her stomach is not as sore anymore. She does not want to do the Physical Therapy anymore.

## 2024-05-20 RX ORDER — CARVEDILOL 3.12 MG/1
3.12 TABLET ORAL 2 TIMES DAILY
Qty: 60 TABLET | Refills: 0 | Status: SHIPPED | OUTPATIENT
Start: 2024-05-20

## 2024-05-28 RX ORDER — AMLODIPINE BESYLATE 5 MG/1
TABLET ORAL
Qty: 90 TABLET | Refills: 1 | Status: SHIPPED | OUTPATIENT
Start: 2024-05-28

## 2024-05-28 NOTE — TELEPHONE ENCOUNTER
Rx Refill Note  Requested Prescriptions     Pending Prescriptions Disp Refills    amLODIPine (NORVASC) 5 MG tablet [Pharmacy Med Name: amLODIPine Besylate 5 MG Oral Tablet] 90 tablet 0     Sig: Take 1 tablet by mouth once daily      Last office visit with prescribing clinician: 4/18/2024   Last telemedicine visit with prescribing clinician: Visit date not found   Next office visit with prescribing clinician: 5/26/2024                         Would you like a call back once the refill request has been completed: [] Yes [] No    If the office needs to give you a call back, can they leave a voicemail: [] Yes [] No    Myron Boyd Rep  05/28/24, 09:04 EDT

## 2024-06-05 ENCOUNTER — OFFICE VISIT (OUTPATIENT)
Dept: SLEEP MEDICINE | Facility: HOSPITAL | Age: 74
End: 2024-06-05
Payer: MEDICARE

## 2024-06-05 ENCOUNTER — TELEPHONE (OUTPATIENT)
Dept: SLEEP MEDICINE | Facility: HOSPITAL | Age: 74
End: 2024-06-05
Payer: MEDICARE

## 2024-06-05 ENCOUNTER — LAB (OUTPATIENT)
Dept: OTHER | Facility: HOSPITAL | Age: 74
End: 2024-06-05
Payer: MEDICARE

## 2024-06-05 VITALS — WEIGHT: 176 LBS | BODY MASS INDEX: 32.39 KG/M2 | HEART RATE: 83 BPM | HEIGHT: 62 IN | OXYGEN SATURATION: 95 %

## 2024-06-05 DIAGNOSIS — E66.9 CLASS 1 OBESITY WITH SERIOUS COMORBIDITY AND BODY MASS INDEX (BMI) OF 32.0 TO 32.9 IN ADULT, UNSPECIFIED OBESITY TYPE: ICD-10-CM

## 2024-06-05 DIAGNOSIS — G47.33 OSA (OBSTRUCTIVE SLEEP APNEA): Primary | ICD-10-CM

## 2024-06-05 DIAGNOSIS — D64.9 ANEMIA, UNSPECIFIED TYPE: ICD-10-CM

## 2024-06-05 LAB
ALBUMIN SERPL-MCNC: 4.2 G/DL (ref 3.5–5.2)
ALBUMIN/GLOB SERPL: 1.4 G/DL
ALP SERPL-CCNC: 76 U/L (ref 39–117)
ALT SERPL W P-5'-P-CCNC: 15 U/L (ref 1–33)
ANION GAP SERPL CALCULATED.3IONS-SCNC: 10 MMOL/L (ref 5–15)
AST SERPL-CCNC: 23 U/L (ref 1–32)
BASOPHILS # BLD AUTO: 0.09 10*3/MM3 (ref 0–0.2)
BASOPHILS NFR BLD AUTO: 1.2 % (ref 0–1.5)
BILIRUB SERPL-MCNC: 0.2 MG/DL (ref 0–1.2)
BUN SERPL-MCNC: 22 MG/DL (ref 8–23)
BUN/CREAT SERPL: 22.7 (ref 7–25)
CALCIUM SPEC-SCNC: 9.1 MG/DL (ref 8.6–10.5)
CHLORIDE SERPL-SCNC: 102 MMOL/L (ref 98–107)
CO2 SERPL-SCNC: 27 MMOL/L (ref 22–29)
CREAT SERPL-MCNC: 0.97 MG/DL (ref 0.57–1)
DEPRECATED RDW RBC AUTO: 45.2 FL (ref 37–54)
EGFRCR SERPLBLD CKD-EPI 2021: 62.6 ML/MIN/1.73
EOSINOPHIL # BLD AUTO: 0.21 10*3/MM3 (ref 0–0.4)
EOSINOPHIL NFR BLD AUTO: 2.9 % (ref 0.3–6.2)
ERYTHROCYTE [DISTWIDTH] IN BLOOD BY AUTOMATED COUNT: 15.7 % (ref 12.3–15.4)
FERRITIN SERPL-MCNC: 12 NG/ML (ref 13–150)
GLOBULIN UR ELPH-MCNC: 3 GM/DL
GLUCOSE SERPL-MCNC: 160 MG/DL (ref 65–99)
HCT VFR BLD AUTO: 33.2 % (ref 34–46.6)
HGB BLD-MCNC: 10.2 G/DL (ref 12–15.9)
IMM GRANULOCYTES # BLD AUTO: 0.06 10*3/MM3 (ref 0–0.05)
IMM GRANULOCYTES NFR BLD AUTO: 0.8 % (ref 0–0.5)
IRON 24H UR-MRATE: 37 MCG/DL (ref 37–145)
IRON SATN MFR SERPL: 9 % (ref 20–50)
LYMPHOCYTES # BLD AUTO: 2.25 10*3/MM3 (ref 0.7–3.1)
LYMPHOCYTES NFR BLD AUTO: 31.2 % (ref 19.6–45.3)
MCH RBC QN AUTO: 24.5 PG (ref 26.6–33)
MCHC RBC AUTO-ENTMCNC: 30.7 G/DL (ref 31.5–35.7)
MCV RBC AUTO: 79.8 FL (ref 79–97)
MONOCYTES # BLD AUTO: 0.68 10*3/MM3 (ref 0.1–0.9)
MONOCYTES NFR BLD AUTO: 9.4 % (ref 5–12)
NEUTROPHILS NFR BLD AUTO: 3.93 10*3/MM3 (ref 1.7–7)
NEUTROPHILS NFR BLD AUTO: 54.5 % (ref 42.7–76)
NRBC BLD AUTO-RTO: 0 /100 WBC (ref 0–0.2)
PLATELET # BLD AUTO: 251 10*3/MM3 (ref 140–450)
PMV BLD AUTO: 11.3 FL (ref 6–12)
POTASSIUM SERPL-SCNC: 4.3 MMOL/L (ref 3.5–5.2)
PROT SERPL-MCNC: 7.2 G/DL (ref 6–8.5)
RBC # BLD AUTO: 4.16 10*6/MM3 (ref 3.77–5.28)
SODIUM SERPL-SCNC: 139 MMOL/L (ref 136–145)
TIBC SERPL-MCNC: 401 MCG/DL (ref 298–536)
TRANSFERRIN SERPL-MCNC: 269 MG/DL (ref 200–360)
WBC NRBC COR # BLD AUTO: 7.22 10*3/MM3 (ref 3.4–10.8)

## 2024-06-05 PROCEDURE — 1159F MED LIST DOCD IN RCRD: CPT | Performed by: NURSE PRACTITIONER

## 2024-06-05 PROCEDURE — 85025 COMPLETE CBC W/AUTO DIFF WBC: CPT | Performed by: INTERNAL MEDICINE

## 2024-06-05 PROCEDURE — 99214 OFFICE O/P EST MOD 30 MIN: CPT | Performed by: NURSE PRACTITIONER

## 2024-06-05 PROCEDURE — 83540 ASSAY OF IRON: CPT | Performed by: INTERNAL MEDICINE

## 2024-06-05 PROCEDURE — 80053 COMPREHEN METABOLIC PANEL: CPT | Performed by: INTERNAL MEDICINE

## 2024-06-05 PROCEDURE — G0463 HOSPITAL OUTPT CLINIC VISIT: HCPCS

## 2024-06-05 PROCEDURE — 1160F RVW MEDS BY RX/DR IN RCRD: CPT | Performed by: NURSE PRACTITIONER

## 2024-06-05 PROCEDURE — 82728 ASSAY OF FERRITIN: CPT | Performed by: INTERNAL MEDICINE

## 2024-06-05 PROCEDURE — 36415 COLL VENOUS BLD VENIPUNCTURE: CPT

## 2024-06-05 PROCEDURE — 84466 ASSAY OF TRANSFERRIN: CPT | Performed by: INTERNAL MEDICINE

## 2024-06-05 NOTE — PROGRESS NOTES
[facility]  [address]  [place] [address]  Phone   Fax       Pearl Christiansen  [id number]   1950  73 y.o.  female      Referring Provider and PCP: Jesi Estes MD    Type of service: Initial Sleep Medicine Office Visit (new patient)  Date of service: 6/5/2024          CHIEF COMPLAINT: Obstructive sleep apnea      HISTORY OF PRESENT ILLNESS:  Thank you for asking us to see Pearl Christiansen.  Pearl Christiansen 73 y.o. was seen today on 6/5/2024 at [facility].  Patient presents today to establish care for treatment and management of obstructive sleep apnea.  She was diagnosed approximately 5 years ago.  She is needing to establish with a new sleep provider.  Has history of tonsillectomy around 1960.    Patient had a sleep study 12/2016 showing moderate to severe obstructive sleep apnea with AHI of 23.2/h and RDI of 30.4/h with lowest oxygen saturation 81% and 83% of the diagnostic sleep portion of the study was spent with O2 sats below 89%.  Patient was started on PAP therapy.  Auto CPAP was recommended.  She subsequently underwent overnight oximetry study on room air with CPAP and had some residual nocturnal hypoxia so minimum PAP pressure was increased with improvement in nocturnal oxygen levels.    She feels energy levels improved with PAP use.  No trouble staying awake at work or if active but if she sits in her recliner could doze off.  Needs new device, over 5 years old.  Needs new mask style.  Declines titration study.  Not staying asleep, restless, poor mask fit likely contributing.           SLEEP HISTORY:  Sleep schedule:  Bedtime: 10 to 11 PM  Wake time: 7-9 a.m.  Time it takes to fall asleep: 5 minutes  Average hours of sleep: 7-8  Number of naps per day: 0    Symptoms:   In addition to the above, patient reports the following associated symptoms:  Have you ever awakened gasping for breath, coughing, choking: not with PAP  "  Change in weight:  No   Morning headaches:  Yes   Awaken with a sore throat or dry mouth:  Yes   Leg jerking at night:  Yes   Creepy crawly feeling in legs/urge to move legs: No   Teeth grinding: No   Have you ever awakened at night with a sour taste or burning sensation in your chest:  Yes   Do you have muscle weakness with laughing or anger:  No   Have you ever felt paralyzed while going to sleep or waking up:  No   Sleepwalking: No   Nightmares: No   Nocturia (urination at night): 1 times per night  Memory Problem: No     Medical Conditions (PMH):   Nonobstructive CAD  Diabetes  Hypertension  Peripheral vascular disease    Social history:  Do you drive a commercial vehicle:  No   Shift work:  No   Tobacco use: Former  Alcohol use: 0 per week  Caffeinated drinks: 2 per day  Occupation: Pharmacy tech    Family History (parents and siblings) (pertaining to sleep medicine):  No relevant    Medications: reviewed    Allergies:  Lidocaine, Keflex [cephalexin], and Penicillins      REVIEW OF SYSTEMS:  Pertinent positive symptoms are:  [place] Sleepiness Scale of Total score: 18   Depression: Follows with PCP  TMJ issues  Heartburn      PHYSICAL EXAM:  CONSTITUTINONAL:   Vitals:    06/05/24 1308   Pulse: 83   SpO2: 95%   Weight: 79.8 kg (176 lb)   Height: 157.5 cm (62\")    Body mass index is 32.19 kg/m².   HEAD: atraumatic, normocephalic   NECK: Neck Circumference: 17 inches, trachea is midline  RESPIRATORY SYSTEM: Respirations even, unlabored, normal rate  CARDIOVASULAR SYSTEM: Normal rate  NEUROLOGICAL SYSTEM: Alert and oriented x 3  PSYCHIATRIC SYSTEM: Mood is normal/ appropriate     Office note(s) from care team reviewed. Office note(s) reviewed: 5/2/2024 cardiology note    Labs/ Test Results Reviewed:  TSH          10/20/2023    13:05 11/28/2023    10:32   TSH   TSH 1.680  0.677       Most Recent A1C          4/15/2024    10:39   HGBA1C Most Recent   Hemoglobin A1C 9.10    Also reviewed 2016 sleep study and 2018 " sleep medicine office note        DATA REVIEWED:   The PAP compliance summary downloaded on 4/27/2024 has been reviewed independently by me and discussed with the patient.   Compliance: 100%  More than 4 hr use: 56.7%  Average use of the device: 4 hours 17 minutes per night  Residual AHI: 3.9 /hr (goal < 5.0 /hr)  Device: DreamStation auto CPAP, 12 to 15 cm H2O, ramp at a 4 for 20 minutes  Mask type: Fullface  DME: [employer], changing to [employer]              ASSESSMENT AND PLAN:   Obstructive Sleep Apnea: sleep apnea has improved with the device and treatment.  Patient has excellent compliance with the device for treatment of sleep apnea.  I have personally reviewed the smart card download and discussed the download data with the patient and encouarged continued use of the device.  The residual AHI is acceptable. The device is benefiting the patient and the device is medically necessary.  Recommend patient get supplies from the DME company, change them on a regular basis, and clean as directed.  A prescription for supplies has been sent to the DME company.  Also recommend using CPAP a minimum of 4 hours per night to meet insurance criteria, all night every night recommended for optimum health.  Recommend prioritizing sleep to aid in overall health.  She declines titration study.  Will order new PAP as hers is over 5 years old and likely recalled.  She would like to switch to [employer] if insurance will cover.  Excessive daytime sleepiness:  [place] Sleepiness Scale of Total score: 18.  There are many causes of daytime sleepiness and/or fatigue.  We discussed recommendations to use CPAP all night every night.  Her sleep is restless, due to poor mask fit.  Will see if sleep and energy levels improved with new device and better mask fit.  Do not drive, operate heavy machinery, or do activities that require high concentration if feeling tired/drowsy.  New PAP ordered and she wants to change to [employer] if insurance will  cover.  Obesity: Body mass index is 32.19 kg/m².. Patients who are overweight or obese are at increased risk of sleep apnea/ sleep disordered breathing. Weight reduction and healthy lifestyle are encouraged in overweight/ obese patients as part of a comprehensive approach to sleep apnea treatment.    Diabetes  Nonobstructive CAD, palpitations: Follows with cardiology  Hypertension  Peripheral vascular disease with left lower extremity claudication    I have also discussed with the patient the following  Adequate amount of sleep: most people need around 7 to 9 hours of sleep each night        Patient will follow-up 31 to 90 days after starting use of new PAP or contact the office sooner for questions or concerns. Patient's questions were answered.            Thank you again for asking me to consult on this patient.  Please do not hesitate to call me if you have additional questions or concerns.       Chelsea Harris DNP, APRN  [facility]

## 2024-06-07 RX ORDER — LEVOTHYROXINE SODIUM 0.05 MG/1
50 TABLET ORAL DAILY
Qty: 90 TABLET | Refills: 0 | Status: SHIPPED | OUTPATIENT
Start: 2024-06-07

## 2024-06-07 NOTE — TELEPHONE ENCOUNTER
Rx Refill Note  Requested Prescriptions     Pending Prescriptions Disp Refills    levothyroxine (SYNTHROID, LEVOTHROID) 50 MCG tablet [Pharmacy Med Name: Levothyroxine Sodium 50 MCG Oral Tablet] 90 tablet 0     Sig: Take 1 tablet by mouth once daily      Last office visit with prescribing clinician: 4/18/2024   Last telemedicine visit with prescribing clinician: Visit date not found   Next office visit with prescribing clinician: 8/6/2024                         Would you like a call back once the refill request has been completed: [] Yes [] No    If the office needs to give you a call back, can they leave a voicemail: [] Yes [] No    Josh Hernandez MA  06/07/24, 07:56 EDT

## 2024-06-12 RX ORDER — DICYCLOMINE HYDROCHLORIDE 10 MG/1
10 CAPSULE ORAL 3 TIMES DAILY PRN
Qty: 90 CAPSULE | Refills: 0 | Status: SHIPPED | OUTPATIENT
Start: 2024-06-12

## 2024-06-17 RX ORDER — METFORMIN HYDROCHLORIDE 500 MG/1
TABLET, EXTENDED RELEASE ORAL
Qty: 180 TABLET | Refills: 1 | Status: SHIPPED | OUTPATIENT
Start: 2024-06-17

## 2024-06-18 ENCOUNTER — HOSPITAL ENCOUNTER (OUTPATIENT)
Dept: CARDIOLOGY | Facility: HOSPITAL | Age: 74
Discharge: HOME | End: 2024-06-18
Admitting: STUDENT IN AN ORGANIZED HEALTH CARE EDUCATION/TRAINING PROGRAM
Payer: MEDICARE

## 2024-06-18 DIAGNOSIS — R47.89 WORD FINDING DIFFICULTY: ICD-10-CM

## 2024-06-18 DIAGNOSIS — I73.9 PVD (PERIPHERAL VASCULAR DISEASE) WITH CLAUDICATION: ICD-10-CM

## 2024-06-18 DIAGNOSIS — E11.65 TYPE 2 DIABETES MELLITUS WITH HYPERGLYCEMIA, WITHOUT LONG-TERM CURRENT USE OF INSULIN: ICD-10-CM

## 2024-06-18 DIAGNOSIS — I25.10 NONOBSTRUCTIVE ATHEROSCLEROSIS OF CORONARY ARTERY: ICD-10-CM

## 2024-06-18 LAB
BH CV XLRA MEAS LEFT DIST CCA EDV: -15.7 CM/SEC
BH CV XLRA MEAS LEFT DIST CCA PSV: -69.5 CM/SEC
BH CV XLRA MEAS LEFT DIST ICA EDV: 15.7 CM/SEC
BH CV XLRA MEAS LEFT DIST ICA PSV: 49.5 CM/SEC
BH CV XLRA MEAS LEFT ICA/CCA RATIO: 0.81
BH CV XLRA MEAS LEFT MID ICA EDV: -20.4 CM/SEC
BH CV XLRA MEAS LEFT MID ICA PSV: -56.1 CM/SEC
BH CV XLRA MEAS LEFT PROX CCA EDV: 19.3 CM/SEC
BH CV XLRA MEAS LEFT PROX CCA PSV: 87.9 CM/SEC
BH CV XLRA MEAS LEFT PROX ECA EDV: -11.7 CM/SEC
BH CV XLRA MEAS LEFT PROX ECA PSV: -80.9 CM/SEC
BH CV XLRA MEAS LEFT PROX ICA EDV: -17.7 CM/SEC
BH CV XLRA MEAS LEFT PROX ICA PSV: -49.5 CM/SEC
BH CV XLRA MEAS LEFT PROX SCLA PSV: 166 CM/SEC
BH CV XLRA MEAS LEFT VERTEBRAL A EDV: 18.1 CM/SEC
BH CV XLRA MEAS LEFT VERTEBRAL A PSV: 63.3 CM/SEC
BH CV XLRA MEAS RIGHT DIST CCA EDV: 16.1 CM/SEC
BH CV XLRA MEAS RIGHT DIST CCA PSV: 48.3 CM/SEC
BH CV XLRA MEAS RIGHT DIST ICA EDV: -21.6 CM/SEC
BH CV XLRA MEAS RIGHT DIST ICA PSV: -69.1 CM/SEC
BH CV XLRA MEAS RIGHT ICA/CCA RATIO: 1.46
BH CV XLRA MEAS RIGHT MID ICA EDV: -24 CM/SEC
BH CV XLRA MEAS RIGHT MID ICA PSV: -70.3 CM/SEC
BH CV XLRA MEAS RIGHT PROX CCA EDV: 14.9 CM/SEC
BH CV XLRA MEAS RIGHT PROX CCA PSV: 51.9 CM/SEC
BH CV XLRA MEAS RIGHT PROX ECA EDV: -14.7 CM/SEC
BH CV XLRA MEAS RIGHT PROX ECA PSV: -79.2 CM/SEC
BH CV XLRA MEAS RIGHT PROX ICA EDV: -19.6 CM/SEC
BH CV XLRA MEAS RIGHT PROX ICA PSV: -57 CM/SEC
BH CV XLRA MEAS RIGHT PROX SCLA PSV: 125 CM/SEC
BH CV XLRA MEAS RIGHT VERTEBRAL A EDV: 16.9 CM/SEC
BH CV XLRA MEAS RIGHT VERTEBRAL A PSV: 59.3 CM/SEC
LEFT ARM BP: NORMAL MMHG
RIGHT ARM BP: NORMAL MMHG

## 2024-06-18 PROCEDURE — 93880 EXTRACRANIAL BILAT STUDY: CPT

## 2024-06-18 PROCEDURE — 93880 EXTRACRANIAL BILAT STUDY: CPT | Performed by: SURGERY

## 2024-06-18 NOTE — PROGRESS NOTES
Please call patient let her know that her carotid ultrasound did not reveal any obstructive plaques in her neck vessels.  This is similar to what is going on in her heart, where there are nonobstructive blockages this is all treated with the same therapy including risk factor management, and cholesterol control.  No other changes to treatment are needed.  Thank you for the help.

## 2024-06-24 ENCOUNTER — TELEPHONE (OUTPATIENT)
Dept: ONCOLOGY | Facility: CLINIC | Age: 74
End: 2024-06-24

## 2024-06-24 RX ORDER — CARVEDILOL 3.12 MG/1
3.12 TABLET ORAL 2 TIMES DAILY
Qty: 60 TABLET | Refills: 0 | Status: SHIPPED | OUTPATIENT
Start: 2024-06-24

## 2024-06-24 NOTE — TELEPHONE ENCOUNTER
"  Caller: Pearl Christiansen \"Ramiro\"    Relationship to patient: Self    Best call back number: [phone]    Chief complaint: SCHEDULING     Type of visit: F/U AND VITAL    Requested date: NEXT AVLIABLE AFTER 7-1-2024    If rescheduling, when is the original appointment: 6-25    Additional notes:PT CANCELLED HER LAB APPT ON 6-18, HAD THEN DONE ON 6-5 WILL THIS BE OK FOR HER F/U WITH DR FREEMAN OR WILL THEY NEED TO BE RESCHEDULED AT THE OFFICE     PLEASE ADVISE        "

## 2024-06-24 NOTE — TELEPHONE ENCOUNTER
Rx Refill Note  Requested Prescriptions     Pending Prescriptions Disp Refills    gabapentin (NEURONTIN) 300 MG capsule [Pharmacy Med Name: Gabapentin 300 MG Oral Capsule] 270 capsule 0     Sig: TAKE 1 CAPSULE BY MOUTH THREE TIMES DAILY      Last office visit with prescribing clinician: 4/18/2024   Last telemedicine visit with prescribing clinician: Visit date not found   Next office visit with prescribing clinician: 8/6/2024                         Would you like a call back once the refill request has been completed: [] Yes [] No    If the office needs to give you a call back, can they leave a voicemail: [] Yes [] No    Nikole Causey MA  06/24/24, 10:30 EDT

## 2024-06-25 RX ORDER — GABAPENTIN 300 MG/1
CAPSULE ORAL
Qty: 270 CAPSULE | Refills: 0 | Status: SHIPPED | OUTPATIENT
Start: 2024-06-25

## 2024-07-02 ENCOUNTER — TELEPHONE (OUTPATIENT)
Dept: ONCOLOGY | Facility: CLINIC | Age: 74
End: 2024-07-02
Payer: MEDICARE

## 2024-07-06 NOTE — PROGRESS NOTES
REASON FOR FOLLOW-UP: Anemia    History of Present Illness   The patient is 73-year-old female followed with below medical history and seen by subspecialists including GI 3/23/2023 for GE reflux, gastroparesis and IBS as well as previous cholecystectomy with studies at that point including H&H 11.1 and 34.4 with MCV of 80.0 and MCH 25.8.  Her studies 4/3 include a ferritin at 18.4 and iron of 51.  At this point she was followed by cardiology seen 4/27/2023 with a history of nonobstructive coronary artery disease, sleep apnea on CPAP and  peripheral vascular disease with left lower extremity claudication.  She had been admitted to [facility] for epigastric discomfort undergoing nuclear stress test that was significant for possible infarct but no ischemia.  She is thought to have symptoms from GI origin and was relatively stable with plans to repeat echocardiogram.  This was obtained 5/10/2023 with LV SF of 57.8%, normal left ventricular cavity size and wall thickness, left ventricular diastolic function with grade 1 impaired relaxation.    She had further assessment 5/1/2023 with H&H 10.7 and 33.9, MCHC 25.4.    Patient was seen by primary care 10/6/2023 with leg cramps that were worsening treated symptomatically with electrolyte supplementation, PAD had improvement graded exercise with the patient unable to tolerate Pletal.  She was reviewed by orthopedics 10/12/2023 for her worsening foot pain and thought possibly to have an occult fracture or stress fracture.  Plain films demonstrated degenerative changes with calcaneal spurs but no acute fracture.    Repeat studies 10/20/2023 with H&H of 10.8 and 33.9, MCH of 25.5, MCV 80.1, platelet count 247,000, free T41.01, TSH 1.680.  Her further cardiac exams were unremarkable 11/2/2023 of the patient continued to have fatigue and had respiratory issues treated by primary care 11/8/2023 with Mucinex and Flonase, subsequent reevaluation 11/13 by immediate care  treated with prednisone and Zithromax.    We are asked to see the patient for her anemia and she is seen in office 11/28/2023.  We discussed her findings today in particular her variable anemia which is improved in office 11/28/2023.      After discussion she will be sent back to laboratory obtaining CMP, EPO level, MATHEUS, PE, free serum light chains, MMA, sed rate, B12 level, MATHEUS, PE, free serum light chains.  She had been in her primary care's office today undergoing TSH, free T4, hemoglobin A1c, ferritin, iron profile as well.    Her studies included a CMP with blood glucose of 163, otherwise normal, total cholesterol 149, triglycerides 341, HDL 39, ferritin of 31.9, iron profile at 50% saturation, hemoglobin A1c of 8.0, TSH 0.677, free T41.15, B12 of 551, MMA of 114, IRF of 19.8, reticulocyte percentage 1.48, reticulocyte hemoglobin 31.2, paraprotein analysis with no monoclonal spike, free light chain kappa light 22.7, free lambda light chain 19.1 and kappa/lambda ratio of 1.19, EPO level 24.3, ESR 10, negative NEW comprehensive profile.    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    The patient is next evaluated 7/9/2024, having labs drawn on 6/5/2024.  Over the last several months she has been feeling fatigued and having intermittent dizziness/lightheadedness.  She denies any signs or symptoms of bleeding.  Denies shortness of breath.    Past Medical History:   Diagnosis Date    Allergic     Anemia     Anxiety     Arthritis     Arthritis of back     Arthritis of neck     CAD (coronary artery disease)     50% mid LAD by cath on 8/14/2017    Chronic pain disorder     Depression     Diabetes mellitus     Diverticulosis     Fatty liver 2018    GERD (gastroesophageal reflux disease)     H/O Anal fissure     Headache     Headache, tension-type     Hip arthrosis     Hyperlipidemia     Hypertension      Hypothyroidism     Irritable bowel syndrome     Knee swelling     Low back pain     Low back strain     Neuropathy in diabetes     Obesity     ALEXANDER (obstructive sleep apnea)     PAD (peripheral artery disease)     Stress fracture         Past Surgical History:   Procedure Laterality Date    ADENOIDECTOMY      CARDIAC CATHETERIZATION Left 08/14/2017    [facility]    CHOLECYSTECTOMY      COLONOSCOPY  2015    Diverticulosis, hemorrhoids    COLONOSCOPY W/ POLYPECTOMY  2020    ENDOSCOPY  2016    FOOT SURGERY      FRACTURE SURGERY      Stress fracture about 4 yrs ago    GALLBLADDER SURGERY  1991    HAND SURGERY      RECTOVAGINAL FISTULA REPAIR      SACROILIAC JOINT INJECTION Left 01/31/2024    Procedure: SACROILIAC JOINT INJECTION LEFT 15216;  Surgeon: Rhona Hammer MD;  Location: [facility];  Service: Pain Management;  Laterality: Left;    STEROID INJECTION HIP Left 11/21/2022    Procedure: HIP INJECTION UNDER FLUORO - left;  Surgeon: Rhona Hammer MD;  Location: [facility];  Service: Pain Management;  Laterality: Left;    TONSILLECTOMY      TRIGGER FINGER RELEASE      TRIGGER POINT INJECTION      VULVA BIOPSY      11/2021 Colonoscopy by Dr Hurley- rectal polyp    Current Outpatient Medications on File Prior to Visit   Medication Sig Dispense Refill    Acetaminophen (TYLENOL PO) Take 650 mg by mouth 2 (Two) Times a Day As Needed.      amLODIPine (NORVASC) 5 MG tablet Take 1 tablet by mouth once daily 90 tablet 1    atorvastatin (LIPITOR) 20 MG tablet Take 1 tablet by mouth Every Night. 90 tablet 3    carvedilol (COREG) 3.125 MG tablet Take 1 tablet by mouth twice daily 60 tablet 0    cetirizine (zyrTEC) 5 MG tablet Take 1 tablet by mouth Daily.      clobetasol (TEMOVATE) 0.05 % cream Apply 1 application topically to the appropriate area as directed every night at bedtime. 45 g 0    colestipol (COLESTID) 1 g tablet Take 1 tablet by mouth 2 (Two) Times a Day. If no improvement in diarrhea, increase to 2 pills  twice daily. 120 tablet 5    Diclofenac Sodium (VOLTAREN) 1 % gel gel Apply 4 g topically to the appropriate area as directed 4 (Four) Times a Day.      dicyclomine (BENTYL) 10 MG capsule Take 1 capsule by mouth three times daily as needed 90 capsule 0    escitalopram (LEXAPRO) 20 MG tablet Take 1 tablet by mouth Daily. 90 tablet 1    esomeprazole (nexIUM) 40 MG capsule Take 1 capsule by mouth Every Morning Before Breakfast. 90 capsule 3    famotidine (PEPCID) 40 MG tablet Take 1 tablet by mouth At Night As Needed for Heartburn. 30 tablet 5    gabapentin (NEURONTIN) 300 MG capsule TAKE 1 CAPSULE BY MOUTH THREE TIMES DAILY 270 capsule 0    levothyroxine (SYNTHROID, LEVOTHROID) 50 MCG tablet Take 1 tablet by mouth once daily 90 tablet 0    metFORMIN ER (GLUCOPHAGE-XR) 500 MG 24 hr tablet TAKE 1 TABLET BY MOUTH TWICE DAILY BEFORE MEAL(S) 180 tablet 1    nystatin (MYCOSTATIN) 356103 UNIT/GM ointment Apply 1 application topically to the appropriate area as directed 2 (Two) Times a Day. 30 g 0    SITagliptin (Januvia) 100 MG tablet Take 1 tablet by mouth Daily. 90 tablet 1    tiZANidine (ZANAFLEX) 2 MG tablet Take 1 or 2 tablets PO QHS tolerated 60 tablet 1    aspirin 81 MG EC tablet Take 1 tablet by mouth Daily. (Patient not taking: Reported on 5/2/2024)      icosapent ethyl (Vascepa) 1 g capsule capsule Take 2 g by mouth 2 (Two) Times a Day With Meals. 120 capsule 11     No current facility-administered medications on file prior to visit.        ALLERGIES:    Allergies   Allergen Reactions    Lidocaine Other (See Comments)     Does not work on pt    Keflex [Cephalexin] Rash    Penicillins Rash        Social History     Socioeconomic History    Marital status:      Spouse name: Haresh    Number of children: 2   Tobacco Use    Smoking status: Former     Current packs/day: 0.00     Average packs/day: 1 pack/day for 30.0 years (30.0 ttl pk-yrs)     Types: Cigarettes     Start date: 1/1/1965     Quit date: 6/6/1991      "Years since quittin.1     Passive exposure: Past    Smokeless tobacco: Never    Tobacco comments:     quit in her 40's   Vaping Use    Vaping status: Never Used   Substance and Sexual Activity    Alcohol use: Not Currently     Comment: 1 drink every blue moon    Drug use: Never    Sexual activity: Not Currently     Partners: Male     Birth control/protection: None        Family History   Problem Relation Age of Onset    Heart disease Mother     Hypertension Mother     COPD Mother     Alcohol abuse Brother     Colon cancer Neg Hx     Colon polyps Neg Hx     Crohn's disease Neg Hx     Irritable bowel syndrome Neg Hx     Ulcerative colitis Neg Hx         Review of Systems   Constitutional:  Positive for diaphoresis and fatigue. Negative for fever and unexpected weight change.   HENT:  Positive for mouth sores (Dry mouth).    Eyes: Negative.    Respiratory:  Positive for cough and shortness of breath.         Recent URI   Cardiovascular:  Positive for palpitations (Recent holter).   Gastrointestinal:  Positive for abdominal pain and diarrhea.        Fatty food intolrance   Endocrine: Negative.    Genitourinary:  Positive for frequency.   Musculoskeletal:  Positive for myalgias (night cramps).   Neurological: Negative.    Hematological: Negative.    Psychiatric/Behavioral: Negative.          Objective     Vitals:    24 1449   BP: 132/76   Pulse: 70   Resp: 16   Temp: 98.7 °F (37.1 °C)   TempSrc: Oral   SpO2: 94%   Weight: 80.7 kg (178 lb)   Height: 157 cm (61.81\")   PainSc: 0-No pain             2024     2:50 PM   Current Status   ECOG score 0     Physical Exam  Constitutional:       Appearance: She is obese.   HENT:      Head: Normocephalic and atraumatic.      Nose: Nose normal.      Mouth/Throat:      Mouth: Mucous membranes are moist.      Pharynx: Oropharynx is clear.   Eyes:      Extraocular Movements: Extraocular movements intact.      Conjunctiva/sclera: Conjunctivae normal.      Pupils: Pupils are " equal, round, and reactive to light.   Cardiovascular:      Rate and Rhythm: Normal rate and regular rhythm.      Pulses: Normal pulses.      Heart sounds: Normal heart sounds.   Pulmonary:      Effort: Pulmonary effort is normal.      Breath sounds: Normal breath sounds.   Abdominal:      General: Bowel sounds are normal.      Palpations: Abdomen is soft.   Musculoskeletal:         General: Normal range of motion.      Cervical back: Normal range of motion and neck supple.   Skin:     General: Skin is warm and dry.   Neurological:      General: No focal deficit present.      Mental Status: She is oriented to person, place, and time.   Psychiatric:         Mood and Affect: Mood normal.         Behavior: Behavior normal.           RECENT LABS:  Hematology WBC   Date Value Ref Range Status   06/05/2024 7.22 3.40 - 10.80 10*3/mm3 Final   04/13/2024 8.0 3.4 - 10.8 x10E3/uL Final   05/01/2023 7.49 4.5 - 11.0 10*3/uL Final     RBC   Date Value Ref Range Status   06/05/2024 4.16 3.77 - 5.28 10*6/mm3 Final   04/13/2024 4.59 3.77 - 5.28 x10E6/uL Final   05/01/2023 4.22 4.0 - 5.2 10*6/uL Final     Hemoglobin   Date Value Ref Range Status   06/05/2024 10.2 (L) 12.0 - 15.9 g/dL Final   05/01/2023 10.7 (L) 12.0 - 16.0 g/dL Final     Hematocrit   Date Value Ref Range Status   06/05/2024 33.2 (L) 34.0 - 46.6 % Final   05/01/2023 33.9 (L) 36.0 - 46.0 % Final     Platelets   Date Value Ref Range Status   06/05/2024 251 140 - 450 10*3/mm3 Final   05/01/2023 233 140 - 440 10*3/uL Final          Assessment & Plan     73-year-old female with a history including GE reflux, gastroparesis, IBS, previous cholecystectomy, nonobstructive coronary artery disease, peripheral vascular disease, worsening leg cramps.    *Iron Deficiency Anemia  Treated with oral iron previously   Peripheral smear reveals normocytic normochromic RBCs with mild polychromatophilia, microcytosis and hypochromia.    Leukocytes appear normal per number differential,  platelets appear normal per number and size.  The patient's anemia is not progressive, and is suspected to be related to her chronic illnesses, but we do need to assess to try to determine whether we could improve this for her.  Her studies included a CMP with blood glucose of 163, otherwise normal, total cholesterol 149, triglycerides 341, HDL 39, ferritin of 31.9, iron profile at 50% saturation, hemoglobin A1c of 8.0, TSH 0.677, free T41.15, B12 of 551, MMA of 114, IRF of 19.8, reticulocyte percentage 1.48, reticulocyte hemoglobin 31.2, paraprotein analysis with no monoclonal spike, free light chain kappa light 22.7, free lambda light chain 19.1 and kappa/lambda ratio of 1.19, EPO level 24.3, ESR 10, negative NEW comprehensive profile.  The patient is seen 1/9/2024 with modest improvement in her hemoglobin hematocrit no significant abnormalities on additional testing.  There is likely a component of anemia of chronic disease present no additional intervention is not yet felt necessary.  6/5/2024: hemoglobin 10.2, ferritin dropped to 12.0, iron saturation declined to 9%.    7/9/2024: Hemoglobin 10.5.  Iron labs pending.  Once iron results are available, if still iron deficient plan to start oral iron once daily and recheck labs in 3 months.      Plan:   Awaiting results for iron labs today.  If patient is still iron deficient plan to start ferrous sulfate once daily.  Return in 3 months for MD with labs 1 week prior-CBC, ferritin, iron profile.     ADDENDUM: Iron saturation 9%, TIBC 440, ferritin 12.9.  Will start ferrous sulfate once daily.  Prescription sent to pharmacy.  Called patient to notify her.

## 2024-07-09 ENCOUNTER — APPOINTMENT (OUTPATIENT)
Dept: OTHER | Facility: HOSPITAL | Age: 74
End: 2024-07-09
Payer: MEDICARE

## 2024-07-09 ENCOUNTER — OFFICE VISIT (OUTPATIENT)
Dept: ONCOLOGY | Facility: CLINIC | Age: 74
End: 2024-07-09
Payer: MEDICARE

## 2024-07-09 ENCOUNTER — LAB (OUTPATIENT)
Dept: OTHER | Facility: HOSPITAL | Age: 74
End: 2024-07-09
Payer: MEDICARE

## 2024-07-09 VITALS
OXYGEN SATURATION: 94 % | TEMPERATURE: 98.7 F | HEART RATE: 70 BPM | RESPIRATION RATE: 16 BRPM | SYSTOLIC BLOOD PRESSURE: 132 MMHG | DIASTOLIC BLOOD PRESSURE: 76 MMHG | WEIGHT: 178 LBS | BODY MASS INDEX: 32.76 KG/M2 | HEIGHT: 62 IN

## 2024-07-09 DIAGNOSIS — D64.9 ANEMIA, UNSPECIFIED TYPE: Primary | ICD-10-CM

## 2024-07-09 DIAGNOSIS — D64.9 ANEMIA, UNSPECIFIED TYPE: ICD-10-CM

## 2024-07-09 LAB
BASOPHILS # BLD AUTO: 0.08 10*3/MM3 (ref 0–0.2)
BASOPHILS NFR BLD AUTO: 1.2 % (ref 0–1.5)
DEPRECATED RDW RBC AUTO: 44.7 FL (ref 37–54)
EOSINOPHIL # BLD AUTO: 0.19 10*3/MM3 (ref 0–0.4)
EOSINOPHIL NFR BLD AUTO: 2.8 % (ref 0.3–6.2)
ERYTHROCYTE [DISTWIDTH] IN BLOOD BY AUTOMATED COUNT: 15.5 % (ref 12.3–15.4)
FERRITIN SERPL-MCNC: 12.9 NG/ML (ref 13–150)
HCT VFR BLD AUTO: 33.6 % (ref 34–46.6)
HGB BLD-MCNC: 10.5 G/DL (ref 12–15.9)
IMM GRANULOCYTES # BLD AUTO: 0.02 10*3/MM3 (ref 0–0.05)
IMM GRANULOCYTES NFR BLD AUTO: 0.3 % (ref 0–0.5)
IRON 24H UR-MRATE: 40 MCG/DL (ref 37–145)
IRON SATN MFR SERPL: 9 % (ref 20–50)
LYMPHOCYTES # BLD AUTO: 2.25 10*3/MM3 (ref 0.7–3.1)
LYMPHOCYTES NFR BLD AUTO: 32.7 % (ref 19.6–45.3)
MCH RBC QN AUTO: 25 PG (ref 26.6–33)
MCHC RBC AUTO-ENTMCNC: 31.3 G/DL (ref 31.5–35.7)
MCV RBC AUTO: 80 FL (ref 79–97)
MONOCYTES # BLD AUTO: 0.65 10*3/MM3 (ref 0.1–0.9)
MONOCYTES NFR BLD AUTO: 9.4 % (ref 5–12)
NEUTROPHILS NFR BLD AUTO: 3.69 10*3/MM3 (ref 1.7–7)
NEUTROPHILS NFR BLD AUTO: 53.6 % (ref 42.7–76)
NRBC BLD AUTO-RTO: 0 /100 WBC (ref 0–0.2)
PLATELET # BLD AUTO: 242 10*3/MM3 (ref 140–450)
PMV BLD AUTO: 10.7 FL (ref 6–12)
RBC # BLD AUTO: 4.2 10*6/MM3 (ref 3.77–5.28)
TIBC SERPL-MCNC: 440 MCG/DL (ref 298–536)
TRANSFERRIN SERPL-MCNC: 295 MG/DL (ref 200–360)
WBC NRBC COR # BLD AUTO: 6.88 10*3/MM3 (ref 3.4–10.8)

## 2024-07-09 PROCEDURE — 99213 OFFICE O/P EST LOW 20 MIN: CPT | Performed by: NURSE PRACTITIONER

## 2024-07-09 PROCEDURE — 1160F RVW MEDS BY RX/DR IN RCRD: CPT | Performed by: NURSE PRACTITIONER

## 2024-07-09 PROCEDURE — 83540 ASSAY OF IRON: CPT | Performed by: NURSE PRACTITIONER

## 2024-07-09 PROCEDURE — 82728 ASSAY OF FERRITIN: CPT | Performed by: NURSE PRACTITIONER

## 2024-07-09 PROCEDURE — 1159F MED LIST DOCD IN RCRD: CPT | Performed by: NURSE PRACTITIONER

## 2024-07-09 PROCEDURE — 1126F AMNT PAIN NOTED NONE PRSNT: CPT | Performed by: NURSE PRACTITIONER

## 2024-07-09 PROCEDURE — 84466 ASSAY OF TRANSFERRIN: CPT | Performed by: NURSE PRACTITIONER

## 2024-07-09 PROCEDURE — 36415 COLL VENOUS BLD VENIPUNCTURE: CPT

## 2024-07-09 PROCEDURE — 85025 COMPLETE CBC W/AUTO DIFF WBC: CPT | Performed by: NURSE PRACTITIONER

## 2024-07-10 NOTE — TELEPHONE ENCOUNTER
Rx Refill Note  Requested Prescriptions     Pending Prescriptions Disp Refills    dicyclomine (BENTYL) 10 MG capsule [Pharmacy Med Name: Dicyclomine HCl 10 MG Oral Capsule] 90 capsule 1     Sig: Take 1 capsule by mouth three times daily as needed      Last office visit with prescribing clinician: 4/18/2024   Last telemedicine visit with prescribing clinician: Visit date not found   Next office visit with prescribing clinician: 8/6/2024                         Would you like a call back once the refill request has been completed: [] Yes [] No    If the office needs to give you a call back, can they leave a voicemail: [] Yes [] No    Myron Boyd Rep  07/10/24, 14:42 EDT

## 2024-07-11 RX ORDER — FERROUS SULFATE 325(65) MG
325 TABLET ORAL
Qty: 30 TABLET | Refills: 3 | Status: SHIPPED | OUTPATIENT
Start: 2024-07-11

## 2024-07-11 RX ORDER — DICYCLOMINE HYDROCHLORIDE 10 MG/1
10 CAPSULE ORAL 3 TIMES DAILY PRN
Qty: 90 CAPSULE | Refills: 1 | Status: SHIPPED | OUTPATIENT
Start: 2024-07-11

## 2024-07-18 RX ORDER — CARVEDILOL 3.12 MG/1
3.12 TABLET ORAL 2 TIMES DAILY
Qty: 60 TABLET | Refills: 0 | Status: SHIPPED | OUTPATIENT
Start: 2024-07-18

## 2024-07-22 RX ORDER — ESOMEPRAZOLE MAGNESIUM 40 MG/1
40 CAPSULE, DELAYED RELEASE ORAL
Qty: 90 CAPSULE | Refills: 0 | Status: SHIPPED | OUTPATIENT
Start: 2024-07-22

## 2024-08-06 ENCOUNTER — OFFICE VISIT (OUTPATIENT)
Dept: FAMILY MEDICINE CLINIC | Facility: CLINIC | Age: 74
End: 2024-08-06
Payer: MEDICARE

## 2024-08-06 VITALS
WEIGHT: 175.9 LBS | SYSTOLIC BLOOD PRESSURE: 130 MMHG | BODY MASS INDEX: 32.37 KG/M2 | RESPIRATION RATE: 18 BRPM | HEART RATE: 70 BPM | DIASTOLIC BLOOD PRESSURE: 68 MMHG | HEIGHT: 62 IN | OXYGEN SATURATION: 95 %

## 2024-08-06 DIAGNOSIS — E11.65 TYPE 2 DIABETES MELLITUS WITH HYPERGLYCEMIA, WITHOUT LONG-TERM CURRENT USE OF INSULIN: Primary | ICD-10-CM

## 2024-08-06 DIAGNOSIS — M54.9 MID BACK PAIN ON RIGHT SIDE: ICD-10-CM

## 2024-08-06 DIAGNOSIS — E78.00 PURE HYPERCHOLESTEROLEMIA: ICD-10-CM

## 2024-08-06 DIAGNOSIS — E03.8 OTHER SPECIFIED HYPOTHYROIDISM: ICD-10-CM

## 2024-08-06 PROCEDURE — 99214 OFFICE O/P EST MOD 30 MIN: CPT | Performed by: INTERNAL MEDICINE

## 2024-08-06 PROCEDURE — 1159F MED LIST DOCD IN RCRD: CPT | Performed by: INTERNAL MEDICINE

## 2024-08-06 PROCEDURE — 1160F RVW MEDS BY RX/DR IN RCRD: CPT | Performed by: INTERNAL MEDICINE

## 2024-08-06 PROCEDURE — G2211 COMPLEX E/M VISIT ADD ON: HCPCS | Performed by: INTERNAL MEDICINE

## 2024-08-06 PROCEDURE — 1126F AMNT PAIN NOTED NONE PRSNT: CPT | Performed by: INTERNAL MEDICINE

## 2024-08-06 PROCEDURE — 3046F HEMOGLOBIN A1C LEVEL >9.0%: CPT | Performed by: INTERNAL MEDICINE

## 2024-08-06 NOTE — PROGRESS NOTES
"Chief Complaint  Back Pain (3 mon f/u )    Subjective        Pearl Christiansen presents to [facility]  History of Present Illness  Pt is here today for 3-4 month follow up.  She continues to have intermittent Right side pain that has been present off and on for years.  It seems to only be present for a few minutes at a time and then resolves.  Some days it is not there at all.  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  [place] told [place] it was related to fatty liver.  No n/v/d.  No constipation.  Had CT in spring that was negative.  _ hepatic cysts and fatty liver and diverticulosis.   She has decline PT.  Insurance wouldn't cover MRI of the spine.    She also has not been checking her sugars regularly.  She denies history of pancreatitis.  She has no history of gastroparesis.  She has called her insurance and states that her insurance will cover Ozempic and she thinks Mounjaro with a higher co-pay.  It was within her budget however.  She tried Ozempic years ago at her previous MD and it did work for her.  She did not have side effects.  Objective   Vital Signs:  /68   Pulse 70   Resp 18   Ht 157 cm (61.81\")   Wt 79.8 kg (175 lb 14.4 oz)   SpO2 95%   BMI 32.37 kg/m²   Estimated body mass index is 32.37 kg/m² as calculated from the following:    Height as of this encounter: 157 cm (61.81\").    Weight as of this encounter: 79.8 kg (175 lb 14.4 oz).     CT Abdomen Pelvis With Contrast (04/15/2024 14:17)           Physical Exam  Vitals and nursing note reviewed.   Constitutional:       Appearance: Normal appearance. She is well-developed.   HENT:      Head: Normocephalic and atraumatic.      Right Ear: External ear normal.      Left Ear: External ear normal.   Eyes:      Extraocular Movements: Extraocular movements intact.      Conjunctiva/sclera: Conjunctivae normal.   Neck:      Vascular: No carotid bruit.   Cardiovascular:      Rate and Rhythm: Normal " rate and regular rhythm.      Heart sounds: Normal heart sounds.      Comments: No bruits  Pulmonary:      Effort: Pulmonary effort is normal. No respiratory distress.      Breath sounds: Normal breath sounds. No stridor. No wheezing, rhonchi or rales.   Chest:      Chest wall: No tenderness.   Abdominal:      General: Bowel sounds are normal. There is no distension.      Palpations: Abdomen is soft. There is no mass.      Tenderness: There is no abdominal tenderness. There is no guarding or rebound.      Hernia: No hernia is present.   Musculoskeletal:      Cervical back: Neck supple.      Right lower leg: No edema.      Left lower leg: No edema.   Lymphadenopathy:      Cervical: No cervical adenopathy.   Skin:     General: Skin is warm.   Neurological:      General: No focal deficit present.      Mental Status: She is alert and oriented to person, place, and time. Mental status is at baseline.   Psychiatric:         Mood and Affect: Mood normal.         Behavior: Behavior normal.         Thought Content: Thought content normal.         Judgment: Judgment normal.        Result Review :                     Assessment and Plan     Diagnoses and all orders for this visit:    1. Type 2 diabetes mellitus with hyperglycemia, without long-term current use of insulin (Primary)  -     Hemoglobin A1c; Future  -     Comprehensive Metabolic Panel; Future  -     Lipid Panel With LDL / HDL Ratio; Future  -     Microalbumin / Creatinine Urine Ratio - Urine, Clean Catch; Future  -     TSH; Future  -     Lipase; Future    2. Pure hypercholesterolemia  -     Hemoglobin A1c; Future  -     Comprehensive Metabolic Panel; Future  -     Lipid Panel With LDL / HDL Ratio; Future  -     Microalbumin / Creatinine Urine Ratio - Urine, Clean Catch; Future  -     TSH; Future    3. Mid back pain on right side    4. Other specified hypothyroidism  -     Hemoglobin A1c; Future  -     Comprehensive Metabolic Panel; Future  -     Lipid Panel With  LDL / HDL Ratio; Future  -     Microalbumin / Creatinine Urine Ratio - Urine, Clean Catch; Future  -     TSH; Future    Other orders  -     Tirzepatide (MOUNJARO) 2.5 MG/0.5ML solution pen-injector pen; Inject 0.5 mL under the skin into the appropriate area as directed 1 (One) Time Per Week.  Dispense: 2 mL; Refill: 0      I would like for her to start Mounjaro 2.5 mg weekly for 1 month.  She will reach out to me via [employer] let me know how she is doing on the Mounjaro.  If she is tolerating it well, after a month I will increase to 5 mg weekly.  I think this will help with weight loss.  It will be interesting to see if some of her right flank side pain improves with weight loss.  It could be that some of this pain is related to functional bowel pain and/or back pain radiating towards the front and the flank area.  It has been there for many years and does not seem to be worsening.  She declines physical therapy because she does not think it would be helpful which I tend to agree with.  It would have been nice to get an MRI of the back but her insurance has denied that.  We discussed using Lidoderm patches which are over-the-counter.  I have ordered blood work for 3-month follow-up.  I would like to see what her labs look like after being on Mounjaro for 3 months.  Patient voiced understanding.  Dietary changes recommended exercise encouraged       Follow Up     No follow-ups on file.  Patient was given instructions and counseling regarding her condition or for health maintenance advice. Please see specific information pulled into the AVS if appropriate.

## 2024-08-15 ENCOUNTER — TELEPHONE (OUTPATIENT)
Dept: ONCOLOGY | Facility: CLINIC | Age: 74
End: 2024-08-15
Payer: MEDICARE

## 2024-08-15 NOTE — TELEPHONE ENCOUNTER
We are concerned about the side effect.  I would agree with a trial of ferric maltol (ACCRUFeR) 30 mg daily.  Thanks, FERNANDO

## 2024-08-15 NOTE — TELEPHONE ENCOUNTER
"  Caller: Pearl Christiansen \"Ramiro\"    Relationship: Self    Best call back number: [phone]      Who are you requesting to speak with (clinical staff, provider,  specific staff member): CLINICAL      What was the call regarding: PT STATES SHE IS NOT TOLERATING HER FERROUS SULFATE WELL, IT IS AGGRAVATING HER ACID REFLUX    PLEASE CALL TO ADVISE    "

## 2024-08-16 RX ORDER — FERRIC MALTOL 30 MG/1
30 CAPSULE ORAL DAILY
Qty: 30 CAPSULE | Refills: 2 | Status: SHIPPED | OUTPATIENT
Start: 2024-08-16

## 2024-08-19 RX ORDER — CARVEDILOL 3.12 MG/1
3.12 TABLET ORAL 2 TIMES DAILY
Qty: 60 TABLET | Refills: 0 | Status: SHIPPED | OUTPATIENT
Start: 2024-08-19

## 2024-08-20 NOTE — TELEPHONE ENCOUNTER
Rx Refill Note  Requested Prescriptions     Pending Prescriptions Disp Refills    escitalopram (LEXAPRO) 20 MG tablet 90 tablet 1     Sig: Take 1 tablet by mouth Daily.      Last office visit with prescribing clinician: 8/6/2024   Last telemedicine visit with prescribing clinician: Visit date not found   Next office visit with prescribing clinician: 11/8/2024                         Would you like a call back once the refill request has been completed: [] Yes [] No    If the office needs to give you a call back, can they leave a voicemail: [] Yes [] No    Melita Padilla LPN  08/20/24, 14:01 EDT

## 2024-08-21 RX ORDER — ESCITALOPRAM OXALATE 20 MG/1
20 TABLET ORAL DAILY
Qty: 90 TABLET | Refills: 1 | Status: SHIPPED | OUTPATIENT
Start: 2024-08-21

## 2024-08-30 NOTE — TELEPHONE ENCOUNTER
Rx Refill Note  Requested Prescriptions     Pending Prescriptions Disp Refills    levothyroxine (SYNTHROID, LEVOTHROID) 50 MCG tablet [Pharmacy Med Name: Levothyroxine Sodium 50 MCG Oral Tablet] 90 tablet 0     Sig: Take 1 tablet by mouth once daily      Last office visit with prescribing clinician: 8/6/2024   Last telemedicine visit with prescribing clinician: Visit date not found   Next office visit with prescribing clinician: 11/8/2024                         Would you like a call back once the refill request has been completed: [] Yes [] No    If the office needs to give you a call back, can they leave a voicemail: [] Yes [] No    Josh Hernandez MA  08/30/24, 14:27 EDT

## 2024-08-31 RX ORDER — LEVOTHYROXINE SODIUM 50 UG/1
50 TABLET ORAL DAILY
Qty: 90 TABLET | Refills: 0 | Status: SHIPPED | OUTPATIENT
Start: 2024-08-31

## 2024-09-10 ENCOUNTER — PATIENT OUTREACH (OUTPATIENT)
Dept: CASE MANAGEMENT | Facility: OTHER | Age: 74
End: 2024-09-10
Payer: MEDICARE

## 2024-09-10 NOTE — OUTREACH NOTE
"AMBULATORY CASE MANAGEMENT NOTE    Names and Relationships of Patient/Support Persons: Contact: ChristiansenPearl \"Ramiro\"; Relationship: Self -     Patient Outreach  RN-ACM outreach with patient regarding identified proactive  HRCM review. Reviewed with patient role of RN-ACM and HRCM case management services. Patient verbalized understanding. Patient states to be compliant with medications; medical appointments and monitoring blood sugars. Patient states to be taking mounjaro; states improvement regarding blood sugars and energy. Patient currently driving. Patient states to appreciate outreach and agreeable to further outreach. Additional outreach scheduled. No futher questions voiced at this time.   Adult Patient Profile  Questions/Answers      Flowsheet Row Most Recent Value   Symptoms/Conditions Managed at Home diabetes, type 2   Diabetes Management Strategies medication therapy, blood glucose testing, other (see comments)  [Physician follow up]   Barriers to Taking Medication as Prescribed none   Equipment Currently Used at Home glucometer        Social Work Assessment  Questions/Answers      Flowsheet Row Most Recent Value   Functional Status Comments Patient states to be independent with ADL's and transportation   Equipment Currently Used at Home glucometer        Send Education  Questions/Answers      Flowsheet Row Most Recent Value   Annual Wellness Visit:  Patient Has Completed            Education Documentation  Follow-Up Care, taught by Nallely Park, RN at 9/10/2024 11:46 AM.  Learner: Patient  Readiness: Acceptance  Method: Explanation  Response: Verbalizes Understanding          Nallely VEGA  Ambulatory Case Management    9/10/2024, 11:46 EDT  "

## 2024-09-11 ENCOUNTER — TELEPHONE (OUTPATIENT)
Dept: FAMILY MEDICINE CLINIC | Facility: CLINIC | Age: 74
End: 2024-09-11
Payer: MEDICARE

## 2024-09-11 NOTE — TELEPHONE ENCOUNTER
Spoke with patient in detail. States she is ready to move up to the 5 mg dose but her last fill cost her $235 and she can  not afford that. She states she looked it up and it was only to be a little over  a hundred dollars. She states if next fill is the expensive then she will let the office known and will not pick it up.

## 2024-09-18 ENCOUNTER — APPOINTMENT (OUTPATIENT)
Dept: WOMENS IMAGING | Facility: HOSPITAL | Age: 74
End: 2024-09-18
Payer: MEDICARE

## 2024-09-18 PROCEDURE — G0279 TOMOSYNTHESIS, MAMMO: HCPCS | Performed by: RADIOLOGY

## 2024-09-18 PROCEDURE — 77066 DX MAMMO INCL CAD BI: CPT | Performed by: RADIOLOGY

## 2024-09-18 PROCEDURE — 76642 ULTRASOUND BREAST LIMITED: CPT | Performed by: RADIOLOGY

## 2024-09-19 ENCOUNTER — TELEPHONE (OUTPATIENT)
Dept: FAMILY MEDICINE CLINIC | Facility: CLINIC | Age: 74
End: 2024-09-19

## 2024-09-19 NOTE — TELEPHONE ENCOUNTER
"    Caller: Pearl Christiansen \"Ramiro\"    Relationship: Self    Best call back number: [phone]     What is the best time to reach you: ANYTIME    Who are you requesting to speak with (clinical staff, provider,  specific staff member):         What was the call regarding: PATIENT IS CALLING IN REGARDS TO Tirzepatide (MOUNJARO) 5 MG/0.5ML solution pen-injector pen. THE MEDICATION IS ALMOST 300 DOLLARS AND SHE CAN'T AFFORD THAT.     IS THERE ANYTHING A LITTLE CHEAPER? OR SHOULD SHE JUST START BACK ON JANUVIA?    PLEASE CALL AND ADVISE      "

## 2024-09-25 ENCOUNTER — TELEPHONE (OUTPATIENT)
Dept: FAMILY MEDICINE CLINIC | Facility: CLINIC | Age: 74
End: 2024-09-25

## 2024-09-25 RX ORDER — SITAGLIPTIN AND METFORMIN HYDROCHLORIDE 1000; 100 MG/1; MG/1
1 TABLET, FILM COATED, EXTENDED RELEASE ORAL
Qty: 90 TABLET | Refills: 1 | Status: SHIPPED | OUTPATIENT
Start: 2024-09-25

## 2024-09-25 NOTE — TELEPHONE ENCOUNTER
"    Caller: Pearl Christiansen \"Ramiro\"    Relationship to patient: Self    Best call back number: [phone]     Patient is needing: PATIENT CALLED THE INSURANCE COMPANY AND STATES THE JANUMET IS THE BEST OPTION AND CAN GET A PA FOR IT FOR A TIER EXCEPTION   WANTS TO KNOW IF SHE SHOULD TRY MEDICATION FOR ONE MONTH TO SEE IF IT WORKS AND DOES SHE NEED TO TAKE THE JANUMET WITH [employer]     CAN SEND A MY CHART MESSAGE           "

## 2024-09-26 ENCOUNTER — TELEPHONE (OUTPATIENT)
Dept: FAMILY MEDICINE CLINIC | Facility: CLINIC | Age: 74
End: 2024-09-26

## 2024-09-26 NOTE — TELEPHONE ENCOUNTER
Caller: JOAQUÍN    Relationship to patient:       Patient is needing: THEY RECEIVED A FAX THAT WAS SUPPOSED TO GO TO THE SLEEP DOCTOR.  CAN YOU PLEASE REFAX IT TO THE SLEEP DOCTOR.  IT WAS FOR CPAP SUPPLIES

## 2024-09-30 RX ORDER — ATORVASTATIN CALCIUM 40 MG/1
40 TABLET, FILM COATED ORAL NIGHTLY
Qty: 90 TABLET | Refills: 0 | Status: SHIPPED | OUTPATIENT
Start: 2024-09-30

## 2024-09-30 RX ORDER — CARVEDILOL 3.12 MG/1
3.12 TABLET ORAL 2 TIMES DAILY
Qty: 60 TABLET | Refills: 0 | Status: SHIPPED | OUTPATIENT
Start: 2024-09-30

## 2024-10-07 DIAGNOSIS — D64.9 ANEMIA, UNSPECIFIED TYPE: Primary | ICD-10-CM

## 2024-10-08 ENCOUNTER — LAB (OUTPATIENT)
Dept: OTHER | Facility: HOSPITAL | Age: 74
End: 2024-10-08
Payer: MEDICARE

## 2024-10-08 DIAGNOSIS — D64.9 ANEMIA, UNSPECIFIED TYPE: ICD-10-CM

## 2024-10-08 LAB
BASOPHILS # BLD AUTO: 0.08 10*3/MM3 (ref 0–0.2)
BASOPHILS NFR BLD AUTO: 1.3 % (ref 0–1.5)
DEPRECATED RDW RBC AUTO: 48 FL (ref 37–54)
EOSINOPHIL # BLD AUTO: 0.22 10*3/MM3 (ref 0–0.4)
EOSINOPHIL NFR BLD AUTO: 3.6 % (ref 0.3–6.2)
ERYTHROCYTE [DISTWIDTH] IN BLOOD BY AUTOMATED COUNT: 15.9 % (ref 12.3–15.4)
FERRITIN SERPL-MCNC: 15.3 NG/ML (ref 13–150)
HCT VFR BLD AUTO: 34.4 % (ref 34–46.6)
HGB BLD-MCNC: 10.5 G/DL (ref 12–15.9)
IMM GRANULOCYTES # BLD AUTO: 0.07 10*3/MM3 (ref 0–0.05)
IMM GRANULOCYTES NFR BLD AUTO: 1.2 % (ref 0–0.5)
IRON 24H UR-MRATE: 45 MCG/DL (ref 37–145)
IRON SATN MFR SERPL: 11 % (ref 20–50)
LYMPHOCYTES # BLD AUTO: 1.81 10*3/MM3 (ref 0.7–3.1)
LYMPHOCYTES NFR BLD AUTO: 30 % (ref 19.6–45.3)
MCH RBC QN AUTO: 25 PG (ref 26.6–33)
MCHC RBC AUTO-ENTMCNC: 30.5 G/DL (ref 31.5–35.7)
MCV RBC AUTO: 81.9 FL (ref 79–97)
MONOCYTES # BLD AUTO: 0.63 10*3/MM3 (ref 0.1–0.9)
MONOCYTES NFR BLD AUTO: 10.4 % (ref 5–12)
NEUTROPHILS NFR BLD AUTO: 3.23 10*3/MM3 (ref 1.7–7)
NEUTROPHILS NFR BLD AUTO: 53.5 % (ref 42.7–76)
NRBC BLD AUTO-RTO: 0 /100 WBC (ref 0–0.2)
PLATELET # BLD AUTO: 202 10*3/MM3 (ref 140–450)
PMV BLD AUTO: 10.8 FL (ref 6–12)
RBC # BLD AUTO: 4.2 10*6/MM3 (ref 3.77–5.28)
TIBC SERPL-MCNC: 408 MCG/DL (ref 298–536)
TRANSFERRIN SERPL-MCNC: 274 MG/DL (ref 200–360)
WBC NRBC COR # BLD AUTO: 6.04 10*3/MM3 (ref 3.4–10.8)

## 2024-10-08 PROCEDURE — 84466 ASSAY OF TRANSFERRIN: CPT | Performed by: INTERNAL MEDICINE

## 2024-10-08 PROCEDURE — 83540 ASSAY OF IRON: CPT | Performed by: INTERNAL MEDICINE

## 2024-10-08 PROCEDURE — 36415 COLL VENOUS BLD VENIPUNCTURE: CPT

## 2024-10-08 PROCEDURE — 85025 COMPLETE CBC W/AUTO DIFF WBC: CPT | Performed by: INTERNAL MEDICINE

## 2024-10-08 PROCEDURE — 82728 ASSAY OF FERRITIN: CPT | Performed by: INTERNAL MEDICINE

## 2024-10-10 ENCOUNTER — PATIENT OUTREACH (OUTPATIENT)
Dept: CASE MANAGEMENT | Facility: OTHER | Age: 74
End: 2024-10-10
Payer: MEDICARE

## 2024-10-10 PROBLEM — D50.9 IRON DEFICIENCY ANEMIA: Status: ACTIVE | Noted: 2024-10-10

## 2024-10-10 NOTE — OUTREACH NOTE
"AMBULATORY CASE MANAGEMENT NOTE    Names and Relationships of Patient/Support Persons: Contact: Tha Christiansenesa QIAN \"Ramiro\"; Relationship: Self -     Patient Outreach  RN-ACM outreach with patient. Patient states to be compliant with medications; no longer taking Mounjaro and taking Janumet as directed. Patient states blood sugars have been elevated and voiced intent to monitor blood sugars more often. Reviewed with patient education; benefit of monitoring blood sugars and diabetic diet and patient verbalized understanding. Patient states to appreciate outreach. No further questions voiced at this time.     Education Documentation  Unresolved/Worsening Symptoms, taught by Nallely Park, NICHOLAS at 10/10/2024  2:47 PM.  Learner: Patient  Readiness: Acceptance  Method: Explanation  Response: Verbalizes Understanding    Follow-Up Care, taught by Nallely Park RN at 10/10/2024  2:47 PM.  Learner: Patient  Readiness: Acceptance  Method: Explanation  Response: Verbalizes Understanding    Healthy Food Choices, taught by Nallely Park RN at 10/10/2024  2:47 PM.  Learner: Patient  Readiness: Acceptance  Method: Explanation  Response: Verbalizes Understanding    Blood Glucose Monitoring, taught by Nallely Park, NICHOLAS at 10/10/2024  2:47 PM.  Learner: Patient  Readiness: Acceptance  Method: Explanation  Response: Verbalizes Understanding          Nallely VEGA  Ambulatory Case Management    10/10/2024, 14:47 EDT  "

## 2024-10-15 ENCOUNTER — OFFICE VISIT (OUTPATIENT)
Dept: ONCOLOGY | Facility: CLINIC | Age: 74
End: 2024-10-15
Payer: MEDICARE

## 2024-10-15 ENCOUNTER — APPOINTMENT (OUTPATIENT)
Dept: OTHER | Facility: HOSPITAL | Age: 74
End: 2024-10-15
Payer: MEDICARE

## 2024-10-15 ENCOUNTER — INFUSION (OUTPATIENT)
Dept: ONCOLOGY | Facility: HOSPITAL | Age: 74
End: 2024-10-15
Payer: MEDICARE

## 2024-10-15 VITALS — HEART RATE: 71 BPM | SYSTOLIC BLOOD PRESSURE: 140 MMHG | DIASTOLIC BLOOD PRESSURE: 82 MMHG

## 2024-10-15 VITALS
SYSTOLIC BLOOD PRESSURE: 136 MMHG | OXYGEN SATURATION: 96 % | TEMPERATURE: 98.1 F | BODY MASS INDEX: 32.85 KG/M2 | WEIGHT: 178.5 LBS | HEART RATE: 73 BPM | HEIGHT: 62 IN | DIASTOLIC BLOOD PRESSURE: 91 MMHG | RESPIRATION RATE: 16 BRPM

## 2024-10-15 DIAGNOSIS — D50.8 IRON DEFICIENCY ANEMIA SECONDARY TO INADEQUATE DIETARY IRON INTAKE: Primary | ICD-10-CM

## 2024-10-15 DIAGNOSIS — D50.9 IRON DEFICIENCY ANEMIA, UNSPECIFIED IRON DEFICIENCY ANEMIA TYPE: Primary | ICD-10-CM

## 2024-10-15 PROCEDURE — 63710000001 CETIRIZINE 10 MG TABLET: Performed by: INTERNAL MEDICINE

## 2024-10-15 PROCEDURE — A9270 NON-COVERED ITEM OR SERVICE: HCPCS | Performed by: INTERNAL MEDICINE

## 2024-10-15 PROCEDURE — 96375 TX/PRO/DX INJ NEW DRUG ADDON: CPT

## 2024-10-15 PROCEDURE — 25810000003 SODIUM CHLORIDE 0.9 % SOLUTION: Performed by: INTERNAL MEDICINE

## 2024-10-15 PROCEDURE — 25010000002 FERUMOXYTOL 510 MG/17ML SOLUTION 17 ML VIAL: Performed by: INTERNAL MEDICINE

## 2024-10-15 PROCEDURE — 96374 THER/PROPH/DIAG INJ IV PUSH: CPT

## 2024-10-15 PROCEDURE — 99214 OFFICE O/P EST MOD 30 MIN: CPT | Performed by: INTERNAL MEDICINE

## 2024-10-15 PROCEDURE — 1126F AMNT PAIN NOTED NONE PRSNT: CPT | Performed by: INTERNAL MEDICINE

## 2024-10-15 RX ORDER — CETIRIZINE HYDROCHLORIDE 10 MG/1
10 TABLET ORAL ONCE
Status: CANCELLED | OUTPATIENT
Start: 2024-10-15 | End: 2024-10-15

## 2024-10-15 RX ORDER — FAMOTIDINE 10 MG/ML
20 INJECTION, SOLUTION INTRAVENOUS ONCE
OUTPATIENT
Start: 2024-10-22

## 2024-10-15 RX ORDER — CETIRIZINE HYDROCHLORIDE 10 MG/1
10 TABLET ORAL ONCE
OUTPATIENT
Start: 2024-10-22 | End: 2024-10-22

## 2024-10-15 RX ORDER — SODIUM CHLORIDE 9 MG/ML
20 INJECTION, SOLUTION INTRAVENOUS ONCE
OUTPATIENT
Start: 2024-10-22

## 2024-10-15 RX ORDER — FAMOTIDINE 10 MG/ML
20 INJECTION, SOLUTION INTRAVENOUS ONCE
Status: CANCELLED | OUTPATIENT
Start: 2024-10-15

## 2024-10-15 RX ORDER — ESOMEPRAZOLE MAGNESIUM 40 MG/1
40 CAPSULE, DELAYED RELEASE ORAL
Qty: 90 CAPSULE | Refills: 0 | Status: SHIPPED | OUTPATIENT
Start: 2024-10-15

## 2024-10-15 RX ORDER — CETIRIZINE HYDROCHLORIDE 10 MG/1
10 TABLET ORAL ONCE
Status: COMPLETED | OUTPATIENT
Start: 2024-10-15 | End: 2024-10-15

## 2024-10-15 RX ORDER — FAMOTIDINE 10 MG/ML
20 INJECTION, SOLUTION INTRAVENOUS ONCE
Status: COMPLETED | OUTPATIENT
Start: 2024-10-15 | End: 2024-10-15

## 2024-10-15 RX ORDER — SODIUM CHLORIDE 9 MG/ML
20 INJECTION, SOLUTION INTRAVENOUS ONCE
Status: CANCELLED | OUTPATIENT
Start: 2024-10-15

## 2024-10-15 RX ORDER — SODIUM CHLORIDE 9 MG/ML
20 INJECTION, SOLUTION INTRAVENOUS ONCE
Status: COMPLETED | OUTPATIENT
Start: 2024-10-15 | End: 2024-10-15

## 2024-10-15 RX ADMIN — FAMOTIDINE 20 MG: 10 INJECTION INTRAVENOUS at 15:19

## 2024-10-15 RX ADMIN — SODIUM CHLORIDE 20 ML/HR: 9 INJECTION, SOLUTION INTRAVENOUS at 15:19

## 2024-10-15 RX ADMIN — FERUMOXYTOL 510 MG: 510 INJECTION INTRAVENOUS at 15:37

## 2024-10-15 RX ADMIN — CETIRIZINE HYDROCHLORIDE 10 MG: 10 TABLET, FILM COATED ORAL at 15:19

## 2024-10-15 NOTE — PROGRESS NOTES
REASON FOR FOLLOW-UP: Multifactoral anemia including anemia of chronic disease and iron deficiency.    History of Present Illness   The patient is 73-year-old female followed with below medical history and seen by subspecialists including GI 3/23/2023 for GE reflux, gastroparesis and IBS as well as previous cholecystectomy with studies at that point including H&H 11.1 and 34.4 with MCV of 80.0 and MCH 25.8.  Her studies 4/3 include a ferritin at 18.4 and iron of 51.  At this point she was followed by cardiology seen 4/27/2023 with a history of nonobstructive coronary artery disease, sleep apnea on CPAP and  peripheral vascular disease with left lower extremity claudication.  She had been admitted to [facility] for epigastric discomfort undergoing nuclear stress test that was significant for possible infarct but no ischemia.  She is thought to have symptoms from GI origin and was relatively stable with plans to repeat echocardiogram.  This was obtained 5/10/2023 with LV SF of 57.8%, normal left ventricular cavity size and wall thickness, left ventricular diastolic function with grade 1 impaired relaxation.    She had further assessment 5/1/2023 with H&H 10.7 and 33.9, MCHC 25.4.    Patient was seen by primary care 10/6/2023 with leg cramps that were worsening treated symptomatically with electrolyte supplementation, PAD had improvement graded exercise with the patient unable to tolerate Pletal.  She was reviewed by orthopedics 10/12/2023 for her worsening foot pain and thought possibly to have an occult fracture or stress fracture.  Plain films demonstrated degenerative changes with calcaneal spurs but no acute fracture.    Repeat studies 10/20/2023 with H&H of 10.8 and 33.9, MCH of 25.5, MCV 80.1, platelet count 247,000, free T41.01, TSH 1.680.  Her further cardiac exams were unremarkable 11/2/2023 of the patient continued to have fatigue and had respiratory issues treated by primary care 11/8/2023  with Mucinex and Flonase, subsequent reevaluation 11/13 by immediate care treated with prednisone and Zithromax.    We are asked to see the patient for her anemia and she is seen in office 11/28/2023.  We discussed her findings today in particular her variable anemia which is improved in office 11/28/2023.      After discussion she will be sent back to laboratory obtaining CMP, EPO level, MATHEUS, PE, free serum light chains, MMA, sed rate, B12 level, MATHEUS, PE, free serum light chains.  She had been in her primary care's office today undergoing TSH, free T4, hemoglobin A1c, ferritin, iron profile as well.    Her studies included a CMP with blood glucose of 163, otherwise normal, total cholesterol 149, triglycerides 341, HDL 39, ferritin of 31.9, iron profile at 50% saturation, hemoglobin A1c of 8.0, TSH 0.677, free T41.15, B12 of 551, MMA of 114, IRF of 19.8, reticulocyte percentage 1.48, reticulocyte hemoglobin 31.2, paraprotein analysis with no monoclonal spike, free light chain kappa light 22.7, free lambda light chain 19.1 and kappa/lambda ratio of 1.19, EPO level 24.3, ESR 10, negative NEW comprehensive profile.  Please note GI workup including previous colonoscopy.    The patient is seen in office 1/9/2024 feeling about the same with general fatigue as result of a busy job-pharmacy tech at [employer].  She is reassessed in July now with evidence of iron deficiency and a trial of oral iron was not tolerable.  Subsequent efforts to obtain more tolerable oral iron with Accrufer though this was not available until the last several days when the patient is seen 10/15/2024.      She was asked to return with studies 10/8/2024 H&H of 10.5 and 34.4, MCV of 81.9 and MCH of 25.0, ferritin of 15.3 and iron profile with 11% saturation.At this point the patient is better treated with IV iron.        Past Medical History:   Diagnosis Date    Allergic     Anemia     Anxiety     Arthritis     Arthritis of back      Arthritis of neck     CAD (coronary artery disease)     50% mid LAD by cath on 8/14/2017    Chronic pain disorder     Depression     Diabetes mellitus     Diverticulosis     Fatty liver 2018    GERD (gastroesophageal reflux disease)     H/O Anal fissure     Headache     Headache, tension-type     Hip arthrosis     Hyperlipidemia     Hypertension     Hypothyroidism     Irritable bowel syndrome     Knee swelling     Low back pain     Low back strain     Neuropathy in diabetes     Obesity     ALEXANDER (obstructive sleep apnea)     PAD (peripheral artery disease)     Stress fracture         Past Surgical History:   Procedure Laterality Date    ADENOIDECTOMY      CARDIAC CATHETERIZATION Left 08/14/2017    [facility]    CHOLECYSTECTOMY      COLONOSCOPY  2015    Diverticulosis, hemorrhoids    COLONOSCOPY W/ POLYPECTOMY  2020    ENDOSCOPY  2016    FOOT SURGERY      FRACTURE SURGERY      Stress fracture about 4 yrs ago    GALLBLADDER SURGERY  1991    HAND SURGERY      RECTOVAGINAL FISTULA REPAIR      SACROILIAC JOINT INJECTION Left 01/31/2024    Procedure: SACROILIAC JOINT INJECTION LEFT 22535;  Surgeon: Rhona Hammer MD;  Location: [facility];  Service: Pain Management;  Laterality: Left;    STEROID INJECTION HIP Left 11/21/2022    Procedure: HIP INJECTION UNDER FLUORO - left;  Surgeon: Rhona Hammer MD;  Location: [facility];  Service: Pain Management;  Laterality: Left;    TONSILLECTOMY      TRIGGER FINGER RELEASE      TRIGGER POINT INJECTION      VULVA BIOPSY      11/2021 Colonoscopy by Dr Hurley- rectal polyp    Current Outpatient Medications on File Prior to Visit   Medication Sig Dispense Refill    Acetaminophen (TYLENOL PO) Take 650 mg by mouth 2 (Two) Times a Day As Needed.      amLODIPine (NORVASC) 5 MG tablet Take 1 tablet by mouth once daily 90 tablet 1    aspirin 81 MG EC tablet Take 1 tablet by mouth Daily.      atorvastatin (LIPITOR) 40 MG tablet TAKE 1 TABLET BY MOUTH ONCE DAILY AT NIGHT 90 tablet 0     carvedilol (COREG) 3.125 MG tablet Take 1 tablet by mouth twice daily 60 tablet 0    cetirizine (zyrTEC) 5 MG tablet Take 1 tablet by mouth Daily.      clobetasol (TEMOVATE) 0.05 % cream Apply 1 application topically to the appropriate area as directed every night at bedtime. 45 g 0    colestipol (COLESTID) 1 g tablet Take 1 tablet by mouth 2 (Two) Times a Day. If no improvement in diarrhea, increase to 2 pills twice daily. 120 tablet 5    dicyclomine (BENTYL) 10 MG capsule Take 1 capsule by mouth three times daily as needed 90 capsule 1    escitalopram (LEXAPRO) 20 MG tablet Take 1 tablet by mouth Daily. 90 tablet 1    famotidine (PEPCID) 40 MG tablet Take 1 tablet by mouth At Night As Needed for Heartburn. 30 tablet 5    gabapentin (NEURONTIN) 300 MG capsule TAKE 1 CAPSULE BY MOUTH THREE TIMES DAILY 270 capsule 0    levothyroxine (SYNTHROID, LEVOTHROID) 50 MCG tablet Take 1 tablet by mouth once daily 90 tablet 0    nystatin (MYCOSTATIN) 398475 UNIT/GM ointment Apply 1 application topically to the appropriate area as directed 2 (Two) Times a Day. 30 g 0    SITagliptin-metFORMIN HCl ER (Janumet XR) 100-1000 MG tablet Take 1 tablet by mouth Daily With Dinner. 90 tablet 1    tiZANidine (ZANAFLEX) 2 MG tablet Take 1 or 2 tablets PO QHS tolerated 60 tablet 1    atorvastatin (LIPITOR) 20 MG tablet Take 1 tablet by mouth Every Night. 90 tablet 3    Diclofenac Sodium (VOLTAREN) 1 % gel gel Apply 4 g topically to the appropriate area as directed 4 (Four) Times a Day.      Ferric Maltol (ACCRUFeR) 30 MG capsule Take 1 capsule by mouth Daily. 30 capsule 2    ferrous sulfate 325 (65 FE) MG tablet Take 1 tablet by mouth Daily With Breakfast. 30 tablet 3    [DISCONTINUED] esomeprazole (nexIUM) 40 MG capsule TAKE 1 CAPSULE BY MOUTH ONCE DAILY IN THE MORNING BEFORE BREAKFAST 90 capsule 0     No current facility-administered medications on file prior to visit.        ALLERGIES:    Allergies   Allergen Reactions    Lidocaine Other  "(See Comments)     Does not work on pt    Keflex [Cephalexin] Rash    Penicillins Rash        Social History     Socioeconomic History    Marital status:      Spouse name: Haresh    Number of children: 2   Tobacco Use    Smoking status: Former     Current packs/day: 0.00     Average packs/day: 1 pack/day for 30.0 years (30.0 ttl pk-yrs)     Types: Cigarettes     Start date: 1965     Quit date: 1991     Years since quittin.3     Passive exposure: Past    Smokeless tobacco: Never    Tobacco comments:     quit in her 40's   Vaping Use    Vaping status: Never Used   Substance and Sexual Activity    Alcohol use: Not Currently     Comment: 1 drink every blue moon    Drug use: Never    Sexual activity: Not Currently     Partners: Male     Birth control/protection: None        Family History   Problem Relation Age of Onset    Heart disease Mother     Hypertension Mother     COPD Mother     Alcohol abuse Brother     Colon cancer Neg Hx     Colon polyps Neg Hx     Crohn's disease Neg Hx     Irritable bowel syndrome Neg Hx     Ulcerative colitis Neg Hx         Review of Systems   Constitutional:  Positive for diaphoresis and fatigue. Negative for fever and unexpected weight change.   HENT:  Positive for mouth sores (Dry mouth).    Eyes: Negative.    Respiratory:  Positive for cough and shortness of breath.         Recent URI   Cardiovascular:  Positive for palpitations (Recent holter).   Gastrointestinal:  Positive for abdominal pain and diarrhea.        Fatty food intolrance   Endocrine: Negative.    Genitourinary:  Positive for frequency.   Musculoskeletal:  Positive for myalgias (night cramps).   Neurological: Negative.    Hematological: Negative.    Psychiatric/Behavioral: Negative.          Objective     Vitals:    10/15/24 1429   BP: 136/91   Pulse: 73   Resp: 16   Temp: 98.1 °F (36.7 °C)   TempSrc: Oral   SpO2: 96%   Weight: 81 kg (178 lb 8 oz)   Height: 157 cm (61.81\")   PainSc: 0-No pain             " 10/15/2024     2:31 PM   Current Status   ECOG score 0       Physical Exam  Constitutional:       Appearance: She is obese.   HENT:      Head: Normocephalic and atraumatic.      Nose: Nose normal.      Mouth/Throat:      Mouth: Mucous membranes are moist.      Pharynx: Oropharynx is clear.   Eyes:      Extraocular Movements: Extraocular movements intact.      Conjunctiva/sclera: Conjunctivae normal.      Pupils: Pupils are equal, round, and reactive to light.   Cardiovascular:      Rate and Rhythm: Normal rate and regular rhythm.      Pulses: Normal pulses.      Heart sounds: Normal heart sounds.   Pulmonary:      Effort: Pulmonary effort is normal.      Breath sounds: Normal breath sounds.   Abdominal:      General: Bowel sounds are normal.      Palpations: Abdomen is soft.   Musculoskeletal:         General: Normal range of motion.      Cervical back: Normal range of motion and neck supple.   Skin:     General: Skin is warm and dry.   Neurological:      General: No focal deficit present.      Mental Status: She is oriented to person, place, and time.   Psychiatric:         Mood and Affect: Mood normal.         Behavior: Behavior normal.           RECENT LABS:  Hematology WBC   Date Value Ref Range Status   10/08/2024 6.04 3.40 - 10.80 10*3/mm3 Final   04/13/2024 8.0 3.4 - 10.8 x10E3/uL Final   05/01/2023 7.49 4.5 - 11.0 10*3/uL Final     RBC   Date Value Ref Range Status   10/08/2024 4.20 3.77 - 5.28 10*6/mm3 Final   04/13/2024 4.59 3.77 - 5.28 x10E6/uL Final   05/01/2023 4.22 4.0 - 5.2 10*6/uL Final     Hemoglobin   Date Value Ref Range Status   10/08/2024 10.5 (L) 12.0 - 15.9 g/dL Final   05/01/2023 10.7 (L) 12.0 - 16.0 g/dL Final     Hematocrit   Date Value Ref Range Status   10/08/2024 34.4 34.0 - 46.6 % Final   05/01/2023 33.9 (L) 36.0 - 46.0 % Final     Platelets   Date Value Ref Range Status   10/08/2024 202 140 - 450 10*3/mm3 Final   05/01/2023 233 140 - 440 10*3/uL Final          Assessment & Plan      74-year-old female with a history including GE reflux, gastroparesis, IBS, previous cholecystectomy, nonobstructive coronary artery disease, peripheral vascular disease, worsening leg cramps found to have variable anemia over the last several visits.  On occasion this has been thought to be iron deficient and she has been treated with oral iron on previous occasions.  Peripheral smear in office today reveals normocytic normochromic RBCs with mild polychromatophilia, microcytosis and hypochromia.  Leukocytes appear normal per number differential, platelets appear normal per number and size.    The patient's anemia is not progressive, and is suspected to be related to her chronic illnesses, but we do need to assess to try to determine whether we could improve this for her.    After discussion she will be sent back to laboratory obtaining CMP, EPO level, MATHEUS, PE, free serum light chains, MMA, sed rate, B12 level, MATHEUS, PE, free serum light chains.  She had been in her primary care's office today undergoing TSH, free T4, hemoglobin A1c, ferritin, iron profile as well.    Her studies included a CMP with blood glucose of 163, otherwise normal, total cholesterol 149, triglycerides 341, HDL 39, ferritin of 31.9, iron profile at 50% saturation, hemoglobin A1c of 8.0, TSH 0.677, free T41.15, B12 of 551, MMA of 114, IRF of 19.8, reticulocyte percentage 1.48, reticulocyte hemoglobin 31.2, paraprotein analysis with no monoclonal spike, free light chain kappa light 22.7, free lambda light chain 19.1 and kappa/lambda ratio of 1.19, EPO level 24.3, ESR 10, negative NEW comprehensive profile.    The patient is seen 1/9/2024 with modest improvement in her hemoglobin hematocrit no significant abnormalities on additional testing.  There is likely a component of anemia of chronic disease present no additional intervention is not yet felt necessary.    She was reassessed in July now with evidence of iron deficiency and a trial of oral  iron was not tolerable.  Subsequent efforts to obtain more tolerable oral iron with Accrufer though this was not available until the last several days when the patient is seen 10/15/2024.      She was asked to return with studies 10/8/2024 H&H of 10.5 and 34.4, MCV of 81.9 and MCH of 25.0, ferritin of 15.3 and iron profile with 11% saturation.At this point the patient is better treated with IV iron.    Plan:  *Proceed with Feraheme today-510 mg    *Repeat Feraheme infusion next week    *11 weeks-CBC, ferritin, iron profile, soluble transferrin receptor    *12 weeks-MD, possible further ferritin

## 2024-10-15 NOTE — LETTER
October 15, 2024     Jesi Estes MD  [address]  [address]  [facility] [address]    Patient: Pearl Christiansen   YOB: 1950   Date of Visit: 10/15/2024     Dear Jesi Estes MD:       Thank you for referring Pearl Christiansen to me for evaluation. Below are the relevant portions of my assessment and plan of care.    If you have questions, please do not hesitate to call me. I look forward to following Pearl along with you.         Sincerely,        Anton Saleh MD        CC: No Recipients    Anton Saleh MD  10/15/24 1516  Sign when Signing Visit        REASON FOR FOLLOW-UP: Multifactoral anemia including anemia of chronic disease and iron deficiency.    History of Present Illness   The patient is 73-year-old female followed with below medical history and seen by subspecialists including GI 3/23/2023 for GE reflux, gastroparesis and IBS as well as previous cholecystectomy with studies at that point including H&H 11.1 and 34.4 with MCV of 80.0 and MCH 25.8.  Her studies 4/3 include a ferritin at 18.4 and iron of 51.  At this point she was followed by cardiology seen 4/27/2023 with a history of nonobstructive coronary artery disease, sleep apnea on CPAP and  peripheral vascular disease with left lower extremity claudication.  She had been admitted to [facility] for epigastric discomfort undergoing nuclear stress test that was significant for possible infarct but no ischemia.  She is thought to have symptoms from GI origin and was relatively stable with plans to repeat echocardiogram.  This was obtained 5/10/2023 with LV SF of 57.8%, normal left ventricular cavity size and wall thickness, left ventricular diastolic function with grade 1 impaired relaxation.    She had further assessment 5/1/2023 with H&H 10.7 and 33.9, MCHC 25.4.    Patient was seen by primary care 10/6/2023 with leg cramps that were worsening treated symptomatically with electrolyte supplementation, PAD had  improvement graded exercise with the patient unable to tolerate Pletal.  She was reviewed by orthopedics 10/12/2023 for her worsening foot pain and thought possibly to have an occult fracture or stress fracture.  Plain films demonstrated degenerative changes with calcaneal spurs but no acute fracture.    Repeat studies 10/20/2023 with H&H of 10.8 and 33.9, MCH of 25.5, MCV 80.1, platelet count 247,000, free T41.01, TSH 1.680.  Her further cardiac exams were unremarkable 11/2/2023 of the patient continued to have fatigue and had respiratory issues treated by primary care 11/8/2023 with Mucinex and Flonase, subsequent reevaluation 11/13 by immediate care treated with prednisone and Zithromax.    We are asked to see the patient for her anemia and she is seen in office 11/28/2023.  We discussed her findings today in particular her variable anemia which is improved in office 11/28/2023.      After discussion she will be sent back to laboratory obtaining CMP, EPO level, MATHEUS, PE, free serum light chains, MMA, sed rate, B12 level, MATHEUS, PE, free serum light chains.  She had been in her primary care's office today undergoing TSH, free T4, hemoglobin A1c, ferritin, iron profile as well.    Her studies included a CMP with blood glucose of 163, otherwise normal, total cholesterol 149, triglycerides 341, HDL 39, ferritin of 31.9, iron profile at 50% saturation, hemoglobin A1c of 8.0, TSH 0.677, free T41.15, B12 of 551, MMA of 114, IRF of 19.8, reticulocyte percentage 1.48, reticulocyte hemoglobin 31.2, paraprotein analysis with no monoclonal spike, free light chain kappa light 22.7, free lambda light chain 19.1 and kappa/lambda ratio of 1.19, EPO level 24.3, ESR 10, negative NEW comprehensive profile.  Please note GI workup including previous colonoscopy.    The patient is seen in office 1/9/2024 feeling about the same with general fatigue as result of a busy job-pharmacy tech at [employer].  She is reassessed in July  now with evidence of iron deficiency and a trial of oral iron was not tolerable.  Subsequent efforts to obtain more tolerable oral iron with Accrufer though this was not available until the last several days when the patient is seen 10/15/2024.      She was asked to return with studies 10/8/2024 H&H of 10.5 and 34.4, MCV of 81.9 and MCH of 25.0, ferritin of 15.3 and iron profile with 11% saturation.At this point the patient is better treated with IV iron.        Past Medical History:   Diagnosis Date   • Allergic    • Anemia    • Anxiety    • Arthritis    • Arthritis of back    • Arthritis of neck    • CAD (coronary artery disease)     50% mid LAD by cath on 8/14/2017   • Chronic pain disorder    • Depression    • Diabetes mellitus    • Diverticulosis    • Fatty liver 2018   • GERD (gastroesophageal reflux disease)    • H/O Anal fissure    • Headache    • Headache, tension-type    • Hip arthrosis    • Hyperlipidemia    • Hypertension    • Hypothyroidism    • Irritable bowel syndrome    • Knee swelling    • Low back pain    • Low back strain    • Neuropathy in diabetes    • Obesity    • ALEXANDER (obstructive sleep apnea)    • PAD (peripheral artery disease)    • Stress fracture         Past Surgical History:   Procedure Laterality Date   • ADENOIDECTOMY     • CARDIAC CATHETERIZATION Left 08/14/2017    [facility]   • CHOLECYSTECTOMY     • COLONOSCOPY  2015    Diverticulosis, hemorrhoids   • COLONOSCOPY W/ POLYPECTOMY  2020   • ENDOSCOPY  2016   • FOOT SURGERY     • FRACTURE SURGERY      Stress fracture about 4 yrs ago   • GALLBLADDER SURGERY  1991   • HAND SURGERY     • RECTOVAGINAL FISTULA REPAIR     • SACROILIAC JOINT INJECTION Left 01/31/2024    Procedure: SACROILIAC JOINT INJECTION LEFT 01536;  Surgeon: Rhona Hammer MD;  Location: [facility] OR;  Service: Pain Management;  Laterality: Left;   • STEROID INJECTION HIP Left 11/21/2022    Procedure: HIP INJECTION UNDER FLUORO - left;  Surgeon: Rhona Hammer MD;   Location: [facility] MAIN OR;  Service: Pain Management;  Laterality: Left;   • TONSILLECTOMY     • TRIGGER FINGER RELEASE     • TRIGGER POINT INJECTION     • VULVA BIOPSY      11/2021 Colonoscopy by Dr Hurley- rectal polyp    Current Outpatient Medications on File Prior to Visit   Medication Sig Dispense Refill   • Acetaminophen (TYLENOL PO) Take 650 mg by mouth 2 (Two) Times a Day As Needed.     • amLODIPine (NORVASC) 5 MG tablet Take 1 tablet by mouth once daily 90 tablet 1   • aspirin 81 MG EC tablet Take 1 tablet by mouth Daily.     • atorvastatin (LIPITOR) 40 MG tablet TAKE 1 TABLET BY MOUTH ONCE DAILY AT NIGHT 90 tablet 0   • carvedilol (COREG) 3.125 MG tablet Take 1 tablet by mouth twice daily 60 tablet 0   • cetirizine (zyrTEC) 5 MG tablet Take 1 tablet by mouth Daily.     • clobetasol (TEMOVATE) 0.05 % cream Apply 1 application topically to the appropriate area as directed every night at bedtime. 45 g 0   • colestipol (COLESTID) 1 g tablet Take 1 tablet by mouth 2 (Two) Times a Day. If no improvement in diarrhea, increase to 2 pills twice daily. 120 tablet 5   • dicyclomine (BENTYL) 10 MG capsule Take 1 capsule by mouth three times daily as needed 90 capsule 1   • escitalopram (LEXAPRO) 20 MG tablet Take 1 tablet by mouth Daily. 90 tablet 1   • famotidine (PEPCID) 40 MG tablet Take 1 tablet by mouth At Night As Needed for Heartburn. 30 tablet 5   • gabapentin (NEURONTIN) 300 MG capsule TAKE 1 CAPSULE BY MOUTH THREE TIMES DAILY 270 capsule 0   • levothyroxine (SYNTHROID, LEVOTHROID) 50 MCG tablet Take 1 tablet by mouth once daily 90 tablet 0   • nystatin (MYCOSTATIN) 083077 UNIT/GM ointment Apply 1 application topically to the appropriate area as directed 2 (Two) Times a Day. 30 g 0   • SITagliptin-metFORMIN HCl ER (Janumet XR) 100-1000 MG tablet Take 1 tablet by mouth Daily With Dinner. 90 tablet 1   • tiZANidine (ZANAFLEX) 2 MG tablet Take 1 or 2 tablets PO QHS tolerated 60 tablet 1   • atorvastatin (LIPITOR)  20 MG tablet Take 1 tablet by mouth Every Night. 90 tablet 3   • Diclofenac Sodium (VOLTAREN) 1 % gel gel Apply 4 g topically to the appropriate area as directed 4 (Four) Times a Day.     • Ferric Maltol (ACCRUFeR) 30 MG capsule Take 1 capsule by mouth Daily. 30 capsule 2   • ferrous sulfate 325 (65 FE) MG tablet Take 1 tablet by mouth Daily With Breakfast. 30 tablet 3   • [DISCONTINUED] esomeprazole (nexIUM) 40 MG capsule TAKE 1 CAPSULE BY MOUTH ONCE DAILY IN THE MORNING BEFORE BREAKFAST 90 capsule 0     No current facility-administered medications on file prior to visit.        ALLERGIES:    Allergies   Allergen Reactions   • Lidocaine Other (See Comments)     Does not work on pt   • Keflex [Cephalexin] Rash   • Penicillins Rash        Social History     Socioeconomic History   • Marital status:      Spouse name: Haresh   • Number of children: 2   Tobacco Use   • Smoking status: Former     Current packs/day: 0.00     Average packs/day: 1 pack/day for 30.0 years (30.0 ttl pk-yrs)     Types: Cigarettes     Start date: 1965     Quit date: 1991     Years since quittin.3     Passive exposure: Past   • Smokeless tobacco: Never   • Tobacco comments:     quit in her 40's   Vaping Use   • Vaping status: Never Used   Substance and Sexual Activity   • Alcohol use: Not Currently     Comment: 1 drink every blue moon   • Drug use: Never   • Sexual activity: Not Currently     Partners: Male     Birth control/protection: None        Family History   Problem Relation Age of Onset   • Heart disease Mother    • Hypertension Mother    • COPD Mother    • Alcohol abuse Brother    • Colon cancer Neg Hx    • Colon polyps Neg Hx    • Crohn's disease Neg Hx    • Irritable bowel syndrome Neg Hx    • Ulcerative colitis Neg Hx         Review of Systems   Constitutional:  Positive for diaphoresis and fatigue. Negative for fever and unexpected weight change.   HENT:  Positive for mouth sores (Dry mouth).    Eyes: Negative.   "  Respiratory:  Positive for cough and shortness of breath.         Recent URI   Cardiovascular:  Positive for palpitations (Recent holter).   Gastrointestinal:  Positive for abdominal pain and diarrhea.        Fatty food intolrance   Endocrine: Negative.    Genitourinary:  Positive for frequency.   Musculoskeletal:  Positive for myalgias (night cramps).   Neurological: Negative.    Hematological: Negative.    Psychiatric/Behavioral: Negative.          Objective    Vitals:    10/15/24 1429   BP: 136/91   Pulse: 73   Resp: 16   Temp: 98.1 °F (36.7 °C)   TempSrc: Oral   SpO2: 96%   Weight: 81 kg (178 lb 8 oz)   Height: 157 cm (61.81\")   PainSc: 0-No pain             10/15/2024     2:31 PM   Current Status   ECOG score 0       Physical Exam  Constitutional:       Appearance: She is obese.   HENT:      Head: Normocephalic and atraumatic.      Nose: Nose normal.      Mouth/Throat:      Mouth: Mucous membranes are moist.      Pharynx: Oropharynx is clear.   Eyes:      Extraocular Movements: Extraocular movements intact.      Conjunctiva/sclera: Conjunctivae normal.      Pupils: Pupils are equal, round, and reactive to light.   Cardiovascular:      Rate and Rhythm: Normal rate and regular rhythm.      Pulses: Normal pulses.      Heart sounds: Normal heart sounds.   Pulmonary:      Effort: Pulmonary effort is normal.      Breath sounds: Normal breath sounds.   Abdominal:      General: Bowel sounds are normal.      Palpations: Abdomen is soft.   Musculoskeletal:         General: Normal range of motion.      Cervical back: Normal range of motion and neck supple.   Skin:     General: Skin is warm and dry.   Neurological:      General: No focal deficit present.      Mental Status: She is oriented to person, place, and time.   Psychiatric:         Mood and Affect: Mood normal.         Behavior: Behavior normal.           RECENT LABS:  Hematology WBC   Date Value Ref Range Status   10/08/2024 6.04 3.40 - 10.80 10*3/mm3 Final "   04/13/2024 8.0 3.4 - 10.8 x10E3/uL Final   05/01/2023 7.49 4.5 - 11.0 10*3/uL Final     RBC   Date Value Ref Range Status   10/08/2024 4.20 3.77 - 5.28 10*6/mm3 Final   04/13/2024 4.59 3.77 - 5.28 x10E6/uL Final   05/01/2023 4.22 4.0 - 5.2 10*6/uL Final     Hemoglobin   Date Value Ref Range Status   10/08/2024 10.5 (L) 12.0 - 15.9 g/dL Final   05/01/2023 10.7 (L) 12.0 - 16.0 g/dL Final     Hematocrit   Date Value Ref Range Status   10/08/2024 34.4 34.0 - 46.6 % Final   05/01/2023 33.9 (L) 36.0 - 46.0 % Final     Platelets   Date Value Ref Range Status   10/08/2024 202 140 - 450 10*3/mm3 Final   05/01/2023 233 140 - 440 10*3/uL Final          Assessment & Plan    74-year-old female with a history including GE reflux, gastroparesis, IBS, previous cholecystectomy, nonobstructive coronary artery disease, peripheral vascular disease, worsening leg cramps found to have variable anemia over the last several visits.  On occasion this has been thought to be iron deficient and she has been treated with oral iron on previous occasions.  Peripheral smear in office today reveals normocytic normochromic RBCs with mild polychromatophilia, microcytosis and hypochromia.  Leukocytes appear normal per number differential, platelets appear normal per number and size.    The patient's anemia is not progressive, and is suspected to be related to her chronic illnesses, but we do need to assess to try to determine whether we could improve this for her.    After discussion she will be sent back to laboratory obtaining CMP, EPO level, MATHEUS, PE, free serum light chains, MMA, sed rate, B12 level, MATHEUS, PE, free serum light chains.  She had been in her primary care's office today undergoing TSH, free T4, hemoglobin A1c, ferritin, iron profile as well.    Her studies included a CMP with blood glucose of 163, otherwise normal, total cholesterol 149, triglycerides 341, HDL 39, ferritin of 31.9, iron profile at 50% saturation, hemoglobin A1c of  8.0, TSH 0.677, free T41.15, B12 of 551, MMA of 114, IRF of 19.8, reticulocyte percentage 1.48, reticulocyte hemoglobin 31.2, paraprotein analysis with no monoclonal spike, free light chain kappa light 22.7, free lambda light chain 19.1 and kappa/lambda ratio of 1.19, EPO level 24.3, ESR 10, negative NEW comprehensive profile.    The patient is seen 1/9/2024 with modest improvement in her hemoglobin hematocrit no significant abnormalities on additional testing.  There is likely a component of anemia of chronic disease present no additional intervention is not yet felt necessary.    She was reassessed in July now with evidence of iron deficiency and a trial of oral iron was not tolerable.  Subsequent efforts to obtain more tolerable oral iron with Accrufer though this was not available until the last several days when the patient is seen 10/15/2024.      She was asked to return with studies 10/8/2024 H&H of 10.5 and 34.4, MCV of 81.9 and MCH of 25.0, ferritin of 15.3 and iron profile with 11% saturation.At this point the patient is better treated with IV iron.    Plan:  *Proceed with Feraheme today-510 mg    *Repeat Feraheme infusion next week    *11 weeks-CBC, ferritin, iron profile, soluble transferrin receptor    *12 weeks-MD, possible further ferritin

## 2024-10-22 ENCOUNTER — INFUSION (OUTPATIENT)
Dept: ONCOLOGY | Facility: HOSPITAL | Age: 74
End: 2024-10-22
Payer: MEDICARE

## 2024-10-22 VITALS
WEIGHT: 181.2 LBS | SYSTOLIC BLOOD PRESSURE: 141 MMHG | TEMPERATURE: 98.7 F | BODY MASS INDEX: 33.34 KG/M2 | HEIGHT: 62 IN | RESPIRATION RATE: 15 BRPM | HEART RATE: 71 BPM | DIASTOLIC BLOOD PRESSURE: 80 MMHG | OXYGEN SATURATION: 93 %

## 2024-10-22 DIAGNOSIS — D50.9 IRON DEFICIENCY ANEMIA, UNSPECIFIED IRON DEFICIENCY ANEMIA TYPE: Primary | ICD-10-CM

## 2024-10-22 PROCEDURE — 25010000002 FERUMOXYTOL 510 MG/17ML SOLUTION 17 ML VIAL: Performed by: INTERNAL MEDICINE

## 2024-10-22 PROCEDURE — 96374 THER/PROPH/DIAG INJ IV PUSH: CPT

## 2024-10-22 PROCEDURE — 63710000001 CETIRIZINE 10 MG TABLET: Performed by: INTERNAL MEDICINE

## 2024-10-22 PROCEDURE — 96375 TX/PRO/DX INJ NEW DRUG ADDON: CPT

## 2024-10-22 PROCEDURE — A9270 NON-COVERED ITEM OR SERVICE: HCPCS | Performed by: INTERNAL MEDICINE

## 2024-10-22 RX ORDER — FAMOTIDINE 10 MG/ML
20 INJECTION, SOLUTION INTRAVENOUS ONCE
Status: COMPLETED | OUTPATIENT
Start: 2024-10-22 | End: 2024-10-22

## 2024-10-22 RX ORDER — CETIRIZINE HYDROCHLORIDE 10 MG/1
10 TABLET ORAL ONCE
Status: COMPLETED | OUTPATIENT
Start: 2024-10-22 | End: 2024-10-22

## 2024-10-22 RX ADMIN — FERUMOXYTOL 510 MG: 510 INJECTION INTRAVENOUS at 10:38

## 2024-10-22 RX ADMIN — CETIRIZINE HYDROCHLORIDE 10 MG: 10 TABLET, FILM COATED ORAL at 10:12

## 2024-10-22 RX ADMIN — FAMOTIDINE 20 MG: 10 INJECTION INTRAVENOUS at 10:12

## 2024-11-01 DIAGNOSIS — Z87.19 HISTORY OF IBS: Primary | ICD-10-CM

## 2024-11-01 RX ORDER — CARVEDILOL 3.12 MG/1
3.12 TABLET ORAL 2 TIMES DAILY
Qty: 60 TABLET | Refills: 0 | Status: SHIPPED | OUTPATIENT
Start: 2024-11-01

## 2024-11-01 NOTE — TELEPHONE ENCOUNTER
Rx Refill Note  Requested Prescriptions     Pending Prescriptions Disp Refills    dicyclomine (BENTYL) 10 MG capsule [Pharmacy Med Name: Dicyclomine HCl 10 MG Oral Capsule] 90 capsule 0     Sig: Take 1 capsule by mouth three times daily as needed      Last office visit with prescribing clinician: 8/6/2024   Last telemedicine visit with prescribing clinician: Visit date not found   Next office visit with prescribing clinician: 11/8/2024                         Would you like a call back once the refill request has been completed: [] Yes [] No    If the office needs to give you a call back, can they leave a voicemail: [] Yes [] No    William Membreno MA  11/01/24, 09:02 EDT

## 2024-11-02 RX ORDER — DICYCLOMINE HYDROCHLORIDE 10 MG/1
10 CAPSULE ORAL 3 TIMES DAILY PRN
Qty: 90 CAPSULE | Refills: 0 | Status: SHIPPED | OUTPATIENT
Start: 2024-11-02

## 2024-11-07 ENCOUNTER — OFFICE VISIT (OUTPATIENT)
Dept: CARDIOLOGY | Facility: CLINIC | Age: 74
End: 2024-11-07
Payer: MEDICARE

## 2024-11-07 VITALS
HEIGHT: 62 IN | SYSTOLIC BLOOD PRESSURE: 140 MMHG | HEART RATE: 73 BPM | DIASTOLIC BLOOD PRESSURE: 82 MMHG | OXYGEN SATURATION: 95 % | BODY MASS INDEX: 32.79 KG/M2 | WEIGHT: 178.2 LBS

## 2024-11-07 DIAGNOSIS — K21.9 GASTROESOPHAGEAL REFLUX DISEASE WITHOUT ESOPHAGITIS: ICD-10-CM

## 2024-11-07 DIAGNOSIS — E11.65 TYPE 2 DIABETES MELLITUS WITH HYPERGLYCEMIA, WITHOUT LONG-TERM CURRENT USE OF INSULIN: Primary | ICD-10-CM

## 2024-11-07 DIAGNOSIS — E78.2 MIXED HYPERLIPIDEMIA: ICD-10-CM

## 2024-11-07 DIAGNOSIS — I25.10 CORONARY ARTERY DISEASE INVOLVING NATIVE CORONARY ARTERY OF NATIVE HEART WITHOUT ANGINA PECTORIS: ICD-10-CM

## 2024-11-07 DIAGNOSIS — I10 PRIMARY HYPERTENSION: ICD-10-CM

## 2024-11-07 RX ORDER — METHOCARBAMOL 500 MG/1
500 TABLET, FILM COATED ORAL 4 TIMES DAILY
COMMUNITY
Start: 2024-09-09

## 2024-11-07 RX ORDER — ESTRADIOL 0.1 MG/G
CREAM VAGINAL
COMMUNITY
Start: 2024-08-23

## 2024-11-07 RX ORDER — MONTELUKAST SODIUM 4 MG/1
1 TABLET, CHEWABLE ORAL 2 TIMES DAILY
COMMUNITY

## 2024-11-07 RX ORDER — ICOSAPENT ETHYL 1 G/1
2 CAPSULE ORAL 2 TIMES DAILY WITH MEALS
Qty: 120 CAPSULE | Refills: 11 | Status: SHIPPED | OUTPATIENT
Start: 2024-11-07 | End: 2025-11-07

## 2024-11-07 NOTE — PROGRESS NOTES
[facility]    Subjective:     Encounter Date:11/07/24      Patient ID: Pearl Christiansen is a 74 y.o. female.    Chief Complaint:   Chief Complaint   Patient presents with    Pure hypercholesterolemia    Follow-up palpitations  History of Present Illness    Ms. Garcia is a pleasant 74 y.o. lady past medical history hypertension, diabetes, nonobstructive coronary artery disease, sleep apnea on CPAP, peripheral vascular disease with left lower extremity claudication who presents for follow-up    She previously followed with Dr. Hutson in our office.  She has had a episodes of epigastric discomfort which have been on and off over the last several years.  Due to her nonobstructive CAD, she was evaluated for cardiac cause.  She was admitted to [facility] for this and she underwent a Lexiscan nuclear stress test which showed a possible apical infarct but no ischemia.  It was thought to be GI origin.    She has been investigated for palpitations in the past and has had Holter's that show intermittent PACs, but nothing more significant.  Palpitations are ongoing.    She reports her sympt she continues to have intermittent palpitation episodes where she will have a few minutes to hours of palpitations where she feels her heart beat prominently, then goes away.  She also has some reproducible pain in the lower portion of her breast in the middle portion of her chest that reproduces with palpation.      She continues graded exercise to help with peripheral vascular claudication.  Her PCP recently started on colestipol due to gallbladder disease.    Cardiac Procedures:  Left cardiac catheterization in [place] 8/14/2017.  Left main was normal.  LAD 50% mid disease.  Circumflex had luminal irregularities.  RCA was dominant with luminal irregularities.  CTA chest at [facility] 10/5/2021.  No PE or aortic pathology noted.  Myocardial perfusion stress test 10/6/2021.  Small apical defect  consistent with artifact.  No ischemia.  Echocardiogram 11/24/2021.  LVEF 61-65%.  Mild hypertrophy.  Grade 1 diastolic dysfunction.  Normal RV cavity size and systolic function.  No evidence of pericardial effusion.  24-hour monitor 11/30/2021.  Normal monitor study.  Patient had total of 7 PACs.  ZIO monitor 1/30/2022.  Underlying rhythm was sinus with average heart rate 85.  SVT ectopy less than 1%  Echocardiogram May 10, 2023: Normal LVEF 57.8%.  Grade 1 diastolic dysfunction.  Otherwise unremarkable.  Echo May 10, 2023:  LVEF 58%, grade 1 diastolic dysfunction, otherwise normal    The following portions of the patient's history were reviewed and updated as appropriate: allergies, current medications, past family history, past medical history, past social history, past surgical history and problem list.    Past Medical History:   Diagnosis Date    Allergic     Anemia     Anxiety     Arthritis     Arthritis of back     Arthritis of neck     CAD (coronary artery disease)     50% mid LAD by cath on 8/14/2017    Chronic pain disorder     Depression     Diabetes mellitus     Diverticulosis     Fatty liver 2018    GERD (gastroesophageal reflux disease)     H/O Anal fissure     Headache     Headache, tension-type     Hip arthrosis     Hyperlipidemia     Hypertension     Hypothyroidism     Irritable bowel syndrome     Knee swelling     Low back pain     Low back strain     Neuropathy in diabetes     Obesity     ALEXANDER (obstructive sleep apnea)     PAD (peripheral artery disease)     Stress fracture        Past Surgical History:   Procedure Laterality Date    ADENOIDECTOMY      CARDIAC CATHETERIZATION Left 08/14/2017    [facility]    CHOLECYSTECTOMY      COLONOSCOPY  2015    Diverticulosis, hemorrhoids    COLONOSCOPY W/ POLYPECTOMY  2020    ENDOSCOPY  2016    FOOT SURGERY      FRACTURE SURGERY      Stress fracture about 4 yrs ago    GALLBLADDER SURGERY  1991    HAND SURGERY      RECTOVAGINAL FISTULA REPAIR      SACROILIAC  "JOINT INJECTION Left 01/31/2024    Procedure: SACROILIAC JOINT INJECTION LEFT 22027;  Surgeon: Rhona Hammer MD;  Location: [facility];  Service: Pain Management;  Laterality: Left;    STEROID INJECTION HIP Left 11/21/2022    Procedure: HIP INJECTION UNDER FLUORO - left;  Surgeon: Rhona Hammer MD;  Location: [facility];  Service: Pain Management;  Laterality: Left;    TONSILLECTOMY      TRIGGER FINGER RELEASE      TRIGGER POINT INJECTION      VULVA BIOPSY           Procedures       Objective:     Vitals:    11/07/24 1035   BP: 140/82   BP Location: Left arm   Patient Position: Sitting   Cuff Size: Adult   Pulse: 73   SpO2: 95%   Weight: 80.8 kg (178 lb 3.2 oz)   Height: 157.5 cm (62\")           Constitutional:       Appearance: Healthy appearance. Not in distress.   Neck:      Vascular: JVD normal.   Pulmonary:      Effort: Pulmonary effort is normal.      Breath sounds: Normal breath sounds.   Cardiovascular:      PMI at left midclavicular line. Normal rate. Regular rhythm. Normal S2.       Murmurs: There is a grade 1/6 harsh midsystolic murmur at the URSB, radiating to the neck.      Comments: No carotid bruits detected  Pulses:     Intact distal pulses.   Edema:     Peripheral edema absent.   Skin:     General: Skin is warm and dry.   Neurological:      General: No focal deficit present.      Mental Status: Alert, oriented to person, place, and time and oriented to person, place and time.   Psychiatric:         Mood and Affect: Mood and affect normal.         Lab Review:     Lipid Panel          11/28/2023    10:32 4/15/2024    10:39   Lipid Panel   Total Cholesterol 149  132    Triglycerides 341  310    HDL Cholesterol 39  37    VLDL Cholesterol 53  47    LDL Cholesterol  57  48    LDL/HDL Ratio 1.07  0.89      BUN   Date Value Ref Range Status   06/05/2024 22 8 - 23 mg/dL Final   10/06/2021 18 10 - 20 mg/dL Final     Creatinine   Date Value Ref Range Status   06/05/2024 0.97 0.57 - 1.00 mg/dL Final "   04/15/2024 0.90 0.60 - 1.30 mg/dL Final     Comment:     Serial Number: [id number]:  [id number]   10/06/2021 0.84 0.55 - 1.02 mg/dL Final   10/22/2019 1.02 0.57 - 1.11 mg/dL Final     Potassium   Date Value Ref Range Status   06/05/2024 4.3 3.5 - 5.2 mmol/L Final   10/06/2021 4.4 3.5 - 5.1 mmol/L Final     ALT (SGPT)   Date Value Ref Range Status   06/05/2024 15 1 - 33 U/L Final   10/05/2021 14 0 - 55 U/L Final     AST (SGOT)   Date Value Ref Range Status   06/05/2024 23 1 - 32 U/L Final   10/05/2021 17 5 - 34 U/L Final         Performed        Assessment:          Diagnosis Plan   1. Type 2 diabetes mellitus with hyperglycemia, without long-term current use of insulin        2. Coronary artery disease involving native coronary artery of native heart without angina pectoris        3. Gastroesophageal reflux disease without esophagitis        4. Mixed hyperlipidemia        5. Primary hypertension                   Plan:         Coronary artery disease, nonobstructive: Possible apical infarct on SPECT at [place] 2021, but this was not revealed on echocardiogram.    Free of angina.  Echo showed no infarction.      Cardiac cath 2017 showed 50% mid LAD lesion      Cardiac murmur.  Likely flow murmur.  Echo was unremarkable.  Palpitations, PACs  Continue carvedilol.  She reports mild improvement in her symptoms with this.    We have not captured any other significant arrhythmias besides occasional PACs.    Her fatigue does not seem to been affected by carvedilol.  Continue    Aspirin 81 daily  Peripheral vascular disease, hyperlipidemia: LDL controlled on most recent evaluation.    Continue Lipitor 20.    Her triglycerides remain elevated.  I have recommended she start Vascepa.  I ordered this at last visit but patient was not sure what happened.  This drug is indicated her triglycerides remain greater than 150 and she has coronary heart disease as well as peripheral vascular disease with claudication.  She would benefit  from triglycerides less than 150.   She takes colestipol for history of cholecystectomy     RTC 6 months.  Starting Vascepa in the interim    Harvinder Ramon MD  [facility]  11/07/24  08:59 EDT       Current Outpatient Medications:     Acetaminophen (TYLENOL PO), Take 650 mg by mouth 2 (Two) Times a Day As Needed., Disp: , Rfl:     amLODIPine (NORVASC) 5 MG tablet, Take 1 tablet by mouth once daily, Disp: 90 tablet, Rfl: 1    aspirin 81 MG EC tablet, Take 1 tablet by mouth Daily., Disp: , Rfl:     atorvastatin (LIPITOR) 40 MG tablet, TAKE 1 TABLET BY MOUTH ONCE DAILY AT NIGHT, Disp: 90 tablet, Rfl: 0    carvedilol (COREG) 3.125 MG tablet, Take 1 tablet by mouth twice daily, Disp: 60 tablet, Rfl: 0    cetirizine (zyrTEC) 5 MG tablet, Take 1 tablet by mouth Daily., Disp: , Rfl:     clobetasol (TEMOVATE) 0.05 % cream, Apply 1 application topically to the appropriate area as directed every night at bedtime., Disp: 45 g, Rfl: 0    colestipol (COLESTID) 1 g tablet, Take 1 tablet by mouth 2 (Two) Times a Day., Disp: , Rfl:     dicyclomine (BENTYL) 10 MG capsule, Take 1 capsule by mouth three times daily as needed, Disp: 90 capsule, Rfl: 0    escitalopram (LEXAPRO) 20 MG tablet, Take 1 tablet by mouth Daily., Disp: 90 tablet, Rfl: 1    esomeprazole (nexIUM) 40 MG capsule, TAKE 1 CAPSULE BY MOUTH ONCE DAILY IN THE MORNING BEFORE BREAKFAST, Disp: 90 capsule, Rfl: 0    estradiol (ESTRACE) 0.1 MG/GM vaginal cream, , Disp: , Rfl:     famotidine (PEPCID) 40 MG tablet, Take 1 tablet by mouth At Night As Needed for Heartburn., Disp: 30 tablet, Rfl: 5    gabapentin (NEURONTIN) 300 MG capsule, TAKE 1 CAPSULE BY MOUTH THREE TIMES DAILY, Disp: 270 capsule, Rfl: 0    levothyroxine (SYNTHROID, LEVOTHROID) 50 MCG tablet, Take 1 tablet by mouth once daily, Disp: 90 tablet, Rfl: 0    methocarbamol (ROBAXIN) 500 MG tablet, Take 1 tablet by mouth 4 (Four) Times a Day., Disp: , Rfl:     nystatin (MYCOSTATIN) 537301 UNIT/GM  ointment, Apply 1 application topically to the appropriate area as directed 2 (Two) Times a Day., Disp: 30 g, Rfl: 0    tiZANidine (ZANAFLEX) 2 MG tablet, Take 1 or 2 tablets PO QHS tolerated, Disp: 60 tablet, Rfl: 1    atorvastatin (LIPITOR) 20 MG tablet, Take 1 tablet by mouth Every Night., Disp: 90 tablet, Rfl: 3    Diclofenac Sodium (VOLTAREN) 1 % gel gel, Apply 4 g topically to the appropriate area as directed 4 (Four) Times a Day., Disp: , Rfl:     Ferric Maltol (ACCRUFeR) 30 MG capsule, Take 1 capsule by mouth Daily., Disp: 30 capsule, Rfl: 2    ferrous sulfate 325 (65 FE) MG tablet, Take 1 tablet by mouth Daily With Breakfast., Disp: 30 tablet, Rfl: 3    icosapent ethyl (Vascepa) 1 g capsule capsule, Take 2 g by mouth 2 (Two) Times a Day With Meals., Disp: 120 capsule, Rfl: 11    SITagliptin-metFORMIN HCl ER (Janumet XR) 100-1000 MG tablet, Take 1 tablet by mouth Daily With Dinner., Disp: 90 tablet, Rfl: 1         Return in about 6 months (around 5/7/2025).      Part of this note may be an electronic transcription/translation of spoken language to printed text using the Dragon Dictation System.

## 2024-11-07 NOTE — PATIENT INSTRUCTIONS
I would recommend we start the medication called Vascepa, which helps reduce the strain and stress on the heart and reduces cholesterol deposits. This replaces fish oil. Be sure to ask your pharmacist if this medication interacts with the colestipol medication, as it might need to replace that as well.

## 2024-11-08 ENCOUNTER — TELEPHONE (OUTPATIENT)
Dept: FAMILY MEDICINE CLINIC | Facility: CLINIC | Age: 74
End: 2024-11-08

## 2024-11-08 ENCOUNTER — OFFICE VISIT (OUTPATIENT)
Dept: FAMILY MEDICINE CLINIC | Facility: CLINIC | Age: 74
End: 2024-11-08
Payer: MEDICARE

## 2024-11-08 VITALS
DIASTOLIC BLOOD PRESSURE: 78 MMHG | HEIGHT: 62 IN | BODY MASS INDEX: 33.1 KG/M2 | HEART RATE: 71 BPM | SYSTOLIC BLOOD PRESSURE: 128 MMHG | OXYGEN SATURATION: 94 % | WEIGHT: 179.9 LBS

## 2024-11-08 DIAGNOSIS — Q44.6 CYSTIC DISEASE OF LIVER: ICD-10-CM

## 2024-11-08 DIAGNOSIS — R10.9 RIGHT LATERAL ABDOMINAL PAIN: ICD-10-CM

## 2024-11-08 DIAGNOSIS — R16.0 ENLARGED LIVER: Primary | ICD-10-CM

## 2024-11-08 RX ORDER — MULTIPLE VITAMINS W/ MINERALS TAB 9MG-400MCG
1 TAB ORAL DAILY
COMMUNITY

## 2024-11-08 NOTE — TELEPHONE ENCOUNTER
"  Caller: Pearl Christiansen \"Ramiro\"    Relationship to patient: Self    Best call back number: [phone]     Patient is needing: GOT THE PRESCRIPTION FOR JARDIANCE AND IT WAS STILL EXPENSIVE EVEN WITH WHAT THE INSURANCE COVERED IT WAS GOING TO BE $450 FOR A 90 DAY SUPPLY WANTING TO GET THE INFORMATION FOR THE FINICAL ASSISTANT PROGRAM  WAS TELLING HER ABOUT ITS OK TO SEND INFORMATION THROUGH A [employer] MESSAGE           "

## 2024-11-08 NOTE — PROGRESS NOTES
Chief Complaint  Follow-up and Shoulder Pain (Left and right radiating down to elbow)    Patient or patient representative verbalized consent for the use of Ambient Listening during the visit with  Jesi Estes MD for chart documentation. 11/8/2024  11:06 EST    Luis Enrique Christiansen presents to [facility]    History of Present Illness  The patient is a female here for a follow-up visit.    She has type 2 diabetes and reports that her blood sugar levels have been slightly elevated, with a reading of 171 this morning. This is the highest it has been since starting Janumet. She is currently taking Janumet 1000 mg once daily. She experiences diarrhea when the metformin dosage is increased. She is unsure if she has previously tried Farxiga or Jardiance. She has been managing her diabetes with Januvia and metformin for several years. She maintains hydration by keeping a water bottle at her workplace. She is due to refill her Janumet prescription today and is seeking advice on whether to continue this medication.    She has hypothyroidism and is on Levoxyl 50 mcg daily. She also has hyperlipidemia and is on Lipitor 40 mg daily.    For hypertension, she is on Coreg 3.125 mg twice daily and amlodipine 5 mg daily. Her blood pressure is controlled today.    She experiences anxiety and is on Lexapro 20 mg daily.    She has a history of chronic diarrhea following CCX and takes colestipol 1 g twice daily.  She continues to have loose stools often.      For acid indigestion, she takes Nexium 40 mg daily and Pepcid 40 mg at night. She also takes a baby aspirin daily.    She continues to experience pain on her right side, which has been present for a year. The pain is not severe but is noticeable when she rubs her hand over the area. The pain is internal and can be sharp or dull. She has undergone gallbladder removal surgery years ago.  CT abdomen 4/2024 showed enlarged liver with liver  "cysts and fatty liver.  Insurance denied MRI T spine at that time.      She suffers from sciatica, which is particularly bothersome at work. Stretching and Tylenol provide some relief. The pain radiates down her leg. She has not consulted her pain management doctor recently. She is aware that weight loss could alleviate her back and sciatica pain. She is currently under significant stress due to her 's health issues.    Her cardiologist has put her on a new drug for her triglycerides called vascepa,  She is supposed to take 2 with her morning meal and 2 with her evening meal. She is trying to reduce her cheese intake and has cut back significantly.      Objective   Vital Signs:  /78   Pulse 71   Ht 157.5 cm (62.01\")   Wt 81.6 kg (179 lb 14.4 oz)   SpO2 94%   BMI 32.90 kg/m²   Estimated body mass index is 32.9 kg/m² as calculated from the following:    Height as of this encounter: 157.5 cm (62.01\").    Weight as of this encounter: 81.6 kg (179 lb 14.4 oz).    BMI is >= 30 and <35. (Class 1 Obesity). The following options were offered after discussion;: weight loss educational material (shared in after visit summary), exercise counseling/recommendations, and nutrition counseling/recommendations      Physical Exam  Vitals and nursing note reviewed.   Constitutional:       Appearance: Normal appearance. She is well-developed.   HENT:      Head: Normocephalic and atraumatic.      Right Ear: External ear normal.      Left Ear: External ear normal.   Eyes:      Extraocular Movements: Extraocular movements intact.      Conjunctiva/sclera: Conjunctivae normal.   Neck:      Vascular: No carotid bruit.   Cardiovascular:      Rate and Rhythm: Normal rate and regular rhythm.      Heart sounds: Normal heart sounds.      Comments: No bruits  Pulmonary:      Effort: Pulmonary effort is normal. No respiratory distress.      Breath sounds: Normal breath sounds. No stridor. No wheezing, rhonchi or rales.   Chest:     "  Chest wall: No tenderness.   Abdominal:      General: Bowel sounds are normal. There is no distension.      Palpations: Abdomen is soft. There is no mass.      Tenderness: There is no abdominal tenderness. There is no guarding or rebound.      Hernia: No hernia is present.      Comments: ? Liver edge palpable     Musculoskeletal:      Cervical back: Neck supple.      Right lower leg: No edema.      Left lower leg: No edema.   Lymphadenopathy:      Cervical: No cervical adenopathy.   Skin:     General: Skin is warm.   Neurological:      General: No focal deficit present.      Mental Status: She is alert and oriented to person, place, and time. Mental status is at baseline.      Gait: Gait normal.   Psychiatric:         Mood and Affect: Mood normal.         Behavior: Behavior normal.         Thought Content: Thought content normal.         Judgment: Judgment normal.        Result Review :                   Assessment and Plan   Diagnoses and all orders for this visit:    1. Enlarged liver (Primary)  -     MRI abdomen w wo contrast; Future    2. Cystic disease of liver  -     MRI abdomen w wo contrast; Future    3. Right lateral abdominal pain  -     MRI abdomen w wo contrast; Future    Other orders  -     empagliflozin (JARDIANCE) 10 MG tablet tablet; Take 1 tablet by mouth Daily.  Dispense: 90 tablet; Refill: 1             Assessment & Plan  1. Type 2 Diabetes Mellitus.  Blood sugars have been running higher than normal, with a recent reading of 171. She is currently on Janumet -1000 mg daily with dinner. A prescription for Jardiance was provided. She was advised to maintain her current Janumet regimen. Fasting labs were ordered to assess her A1c levels. Should the cost of Jardiance prove prohibitive, efforts will be made to liaise with the insurance company or the pharmaceutical company to explore the possibility of a drug assistance program.  Fasting labs ordered and she will do next week    2.  Hypothyroidism.  She is on Levoxyl 50 mcg daily. No changes were made to her current regimen.    3. Hyperlipidemia.  She is on Lipitor 40 mg daily. She was also prescribed Vascepa (icosapent ethyl) by her cardiologist for elevated triglycerides. She was instructed to take two capsules with her morning meal and two capsules with her evening meal. She was advised to consult with her pharmacist regarding potential interactions with other medications.    4. Hypertension.  Her blood pressure is controlled today. She is on Coreg 3.125 mg twice daily and amlodipine 5 mg daily. No changes were made to her current regimen.    5. Anxiety.  She is on Lexapro 20 mg daily. No changes were made to her current regimen.    6. Chronic Diarrhea.  She takes colestipol 1 g twice daily. She was advised to monitor her symptoms and report any changes, take a probiotic and add benefiber as a stool     7. Acid Indigestion.  She takes Nexium 40 mg daily and Pepcid 40 mg nightly. No changes were made to her current regimen.    8. Right-Sided Pain.  The pain could potentially be attributed to an enlarged liver or a mild colonic stool burden. An MRI of the abdomen was ordered to further investigate the cause of the pain.           Follow Up   No follow-ups on file.  Patient was given instructions and counseling regarding her condition or for health maintenance advice. Please see specific information pulled into the AVS if appropriate.           Jesi Estes MD   11:28 EST   [unfilled]       Answers submitted by the patient for this visit:  Other (Submitted on 11/5/2024)  Please describe your symptoms.: follow -up  Have you had these symptoms before?: Yes  How long have you been having these symptoms?: 1-4 days  Primary Reason for Visit (Submitted on 11/5/2024)  What is the primary reason for your visit?: Problem Not Listed

## 2024-11-11 ENCOUNTER — PATIENT OUTREACH (OUTPATIENT)
Dept: CASE MANAGEMENT | Facility: OTHER | Age: 74
End: 2024-11-11
Payer: MEDICARE

## 2024-11-11 NOTE — OUTREACH NOTE
"AMBULATORY CASE MANAGEMENT NOTE    Names and Relationships of Patient/Support Persons: Contact: ChristiansenPearl \"Ramiro\"; Relationship: Self -     Patient Outreach  RN-ACM outreach with patient. Patient  states no difficulty with fever; chest pain; SOB; appetite or sleeping. Patient states to be compliant with medications; taking Jardiance and attempting to receiving Janument with Patient Assistance Program. Patient states to be monitoring blood sugars; stating highest value of 171. Reviewed with patient education and patient verbalized understanding. Patient states to appreciate outreach. No further questions voiced at this time.     Education Documentation  Unresolved/Worsening Symptoms, taught by Nallely Park, RN at 11/11/2024  2:22 PM.  Learner: Patient  Readiness: Acceptance  Method: Explanation  Response: Verbalizes Understanding    Follow-Up Care, taught by Nallely Park RN at 11/11/2024  2:22 PM.  Learner: Patient  Readiness: Acceptance  Method: Explanation  Response: Verbalizes Understanding    Healthy Food Choices, taught by Nallely Park, RN at 11/11/2024  2:22 PM.  Learner: Patient  Readiness: Acceptance  Method: Explanation  Response: Verbalizes Understanding    Blood Glucose Monitoring, taught by Nallely Park, RN at 11/11/2024  2:22 PM.  Learner: Patient  Readiness: Acceptance  Method: Explanation  Response: Verbalizes Understanding          Nallely VEGA  Ambulatory Case Management    11/11/2024, 14:22 EST  "

## 2024-11-17 DIAGNOSIS — M54.9 MID BACK PAIN ON RIGHT SIDE: Primary | ICD-10-CM

## 2024-11-18 RX ORDER — CARVEDILOL 3.12 MG/1
3.12 TABLET ORAL 2 TIMES DAILY
Qty: 60 TABLET | Refills: 0 | Status: SHIPPED | OUTPATIENT
Start: 2024-11-18

## 2024-11-18 RX ORDER — GABAPENTIN 300 MG/1
300 CAPSULE ORAL 3 TIMES DAILY
Qty: 270 CAPSULE | Refills: 2 | Status: SHIPPED | OUTPATIENT
Start: 2024-11-18

## 2024-11-18 NOTE — TELEPHONE ENCOUNTER
Rx Refill Note  Requested Prescriptions     Pending Prescriptions Disp Refills    gabapentin (NEURONTIN) 300 MG capsule [Pharmacy Med Name: Gabapentin 300 MG Oral Capsule] 270 capsule 0     Sig: TAKE 1 CAPSULE BY MOUTH THREE TIMES DAILY      Last office visit with prescribing clinician: 11/8/2024   Last telemedicine visit with prescribing clinician: Visit date not found   Next office visit with prescribing clinician: Visit date not found                         Would you like a call back once the refill request has been completed: [] Yes [] No    If the office needs to give you a call back, can they leave a voicemail: [] Yes [] No    Josh Hernandez MA  11/18/24, 08:53 EST

## 2024-11-19 ENCOUNTER — LAB (OUTPATIENT)
Dept: LAB | Facility: HOSPITAL | Age: 74
End: 2024-11-19
Payer: MEDICARE

## 2024-11-19 DIAGNOSIS — D50.8 IRON DEFICIENCY ANEMIA SECONDARY TO INADEQUATE DIETARY IRON INTAKE: ICD-10-CM

## 2024-11-19 PROCEDURE — 83690 ASSAY OF LIPASE: CPT | Performed by: INTERNAL MEDICINE

## 2024-11-19 PROCEDURE — 82043 UR ALBUMIN QUANTITATIVE: CPT | Performed by: INTERNAL MEDICINE

## 2024-11-19 PROCEDURE — 80053 COMPREHEN METABOLIC PANEL: CPT | Performed by: INTERNAL MEDICINE

## 2024-11-19 PROCEDURE — 82570 ASSAY OF URINE CREATININE: CPT | Performed by: INTERNAL MEDICINE

## 2024-11-19 PROCEDURE — 84443 ASSAY THYROID STIM HORMONE: CPT | Performed by: INTERNAL MEDICINE

## 2024-11-19 PROCEDURE — 83036 HEMOGLOBIN GLYCOSYLATED A1C: CPT | Performed by: INTERNAL MEDICINE

## 2024-11-19 PROCEDURE — 80061 LIPID PANEL: CPT | Performed by: INTERNAL MEDICINE

## 2024-11-19 RX ORDER — AMLODIPINE BESYLATE 5 MG/1
TABLET ORAL
Qty: 90 TABLET | Refills: 1 | Status: SHIPPED | OUTPATIENT
Start: 2024-11-19

## 2024-11-24 DIAGNOSIS — M54.9 MID BACK PAIN ON RIGHT SIDE: ICD-10-CM

## 2024-11-24 DIAGNOSIS — R16.0 ENLARGED LIVER: ICD-10-CM

## 2024-11-24 DIAGNOSIS — R74.8 ELEVATED LIPASE: ICD-10-CM

## 2024-11-24 DIAGNOSIS — R10.9 RIGHT SIDED ABDOMINAL PAIN: ICD-10-CM

## 2024-11-24 DIAGNOSIS — I10 HYPERTENSION, UNSPECIFIED TYPE: Primary | ICD-10-CM

## 2024-11-24 RX ORDER — LOSARTAN POTASSIUM 25 MG/1
25 TABLET ORAL DAILY
Qty: 90 TABLET | Refills: 0 | Status: SHIPPED | OUTPATIENT
Start: 2024-11-24

## 2024-11-25 RX ORDER — LEVOTHYROXINE SODIUM 50 UG/1
50 TABLET ORAL DAILY
Qty: 90 TABLET | Refills: 1 | Status: SHIPPED | OUTPATIENT
Start: 2024-11-25

## 2024-11-25 NOTE — TELEPHONE ENCOUNTER
Rx Refill Note  Requested Prescriptions     Pending Prescriptions Disp Refills    levothyroxine (SYNTHROID, LEVOTHROID) 50 MCG tablet [Pharmacy Med Name: Levothyroxine Sodium 50 MCG Oral Tablet] 90 tablet 0     Sig: Take 1 tablet by mouth once daily      Last office visit with prescribing clinician: 11/8/2024   Last telemedicine visit with prescribing clinician: Visit date not found   Next office visit with prescribing clinician: Visit date not found                         Would you like a call back once the refill request has been completed: [] Yes [] No    If the office needs to give you a call back, can they leave a voicemail: [] Yes [] No    William Membreno MA  11/25/24, 08:45 EST

## 2024-11-26 DIAGNOSIS — I10 HYPERTENSION, UNSPECIFIED TYPE: ICD-10-CM

## 2024-11-26 LAB
BUN SERPL-MCNC: 20 MG/DL (ref 8–23)
BUN/CREAT SERPL: 20.8 (ref 7–25)
CALCIUM SERPL-MCNC: 9.2 MG/DL (ref 8.6–10.5)
CHLORIDE SERPL-SCNC: 101 MMOL/L (ref 98–107)
CO2 SERPL-SCNC: 25.9 MMOL/L (ref 22–29)
CREAT SERPL-MCNC: 0.96 MG/DL (ref 0.57–1)
EGFRCR SERPLBLD CKD-EPI 2021: 63 ML/MIN/1.73
GLUCOSE SERPL-MCNC: 153 MG/DL (ref 65–99)
POTASSIUM SERPL-SCNC: 4.4 MMOL/L (ref 3.5–5.2)
SODIUM SERPL-SCNC: 141 MMOL/L (ref 136–145)

## 2024-12-10 ENCOUNTER — PATIENT OUTREACH (OUTPATIENT)
Dept: CASE MANAGEMENT | Facility: OTHER | Age: 74
End: 2024-12-10
Payer: MEDICARE

## 2024-12-10 NOTE — OUTREACH NOTE
"AMBULATORY CASE MANAGEMENT NOTE    Names and Relationships of Patient/Support Persons: Contact: Tha Christiansenesa QIAN \"Ramiro\"; Relationship: Self -     Patient Outreach  RN-ACM outreach with patient. Patient states no difficulty with chest pain; SOB; appetite or sleeping. Patient states to be obtaining new prescription insurance and states Jardiance may covered and more affordable. Patient states to be compliant with medications; medical appointments and monitoring of blood pressure and blood sugars. Patient states blood sugars fluctuate between 150 to 170 and blood pressure fluctuating with highest value of 182/97. Reviewed with patient education and verbalized understanding. Patient states to appreciate outreach. No further questions voiced at this time.     Education Documentation  Unresolved/Worsening Symptoms, taught by Nallely Park, RN at 12/10/2024  3:45 PM.  Learner: Patient  Readiness: Acceptance  Method: Explanation  Response: Verbalizes Understanding    Blood Pressure Monitoring, taught by Nallely Park, RN at 12/10/2024  3:45 PM.  Learner: Patient  Readiness: Acceptance  Method: Explanation  Response: Verbalizes Understanding    Blood Glucose Monitoring, taught by Nallely Park, RN at 12/10/2024  3:45 PM.  Learner: Patient  Readiness: Acceptance  Method: Explanation  Response: Verbalizes Understanding          Nallely VEGA  Ambulatory Case Management    12/10/2024, 15:45 EST  "

## 2024-12-17 ENCOUNTER — HOSPITAL ENCOUNTER (OUTPATIENT)
Dept: MRI IMAGING | Facility: HOSPITAL | Age: 74
Discharge: HOME | End: 2024-12-17
Admitting: INTERNAL MEDICINE
Payer: MEDICARE

## 2024-12-17 DIAGNOSIS — Q44.6 CYSTIC DISEASE OF LIVER: ICD-10-CM

## 2024-12-17 DIAGNOSIS — R16.0 ENLARGED LIVER: ICD-10-CM

## 2024-12-17 DIAGNOSIS — R10.9 RIGHT LATERAL ABDOMINAL PAIN: ICD-10-CM

## 2024-12-17 LAB — CREAT BLDA-MCNC: 0.9 MG/DL (ref 0.6–1.3)

## 2024-12-17 PROCEDURE — 74183 MRI ABD W/O CNTR FLWD CNTR: CPT

## 2024-12-17 PROCEDURE — 25510000002 GADOBENATE DIMEGLUMINE 529 MG/ML SOLUTION: Performed by: INTERNAL MEDICINE

## 2024-12-17 PROCEDURE — 82565 ASSAY OF CREATININE: CPT

## 2024-12-17 PROCEDURE — A9577 INJ MULTIHANCE: HCPCS | Performed by: INTERNAL MEDICINE

## 2024-12-17 RX ADMIN — GADOBENATE DIMEGLUMINE 17 ML: 529 INJECTION, SOLUTION INTRAVENOUS at 17:19

## 2024-12-18 DIAGNOSIS — Z87.19 HISTORY OF IBS: ICD-10-CM

## 2024-12-18 NOTE — TELEPHONE ENCOUNTER
Rx Refill Note  Requested Prescriptions     Pending Prescriptions Disp Refills    dicyclomine (BENTYL) 10 MG capsule 90 capsule 0     Sig: Take 1 capsule by mouth 3 (Three) Times a Day As Needed.      Last office visit with prescribing clinician: 11/8/2024   Last telemedicine visit with prescribing clinician: Visit date not found   Next office visit with prescribing clinician: Visit date not found                         Would you like a call back once the refill request has been completed: [] Yes [] No    If the office needs to give you a call back, can they leave a voicemail: [] Yes [] No    William Membreno MA  12/18/24, 14:11 EST

## 2024-12-19 RX ORDER — DICYCLOMINE HYDROCHLORIDE 10 MG/1
10 CAPSULE ORAL 3 TIMES DAILY PRN
Qty: 90 CAPSULE | Refills: 0 | Status: SHIPPED | OUTPATIENT
Start: 2024-12-19

## 2024-12-20 RX ORDER — CARVEDILOL 3.12 MG/1
3.12 TABLET ORAL 2 TIMES DAILY
Qty: 60 TABLET | Refills: 0 | Status: SHIPPED | OUTPATIENT
Start: 2024-12-20

## 2024-12-30 RX ORDER — ATORVASTATIN CALCIUM 40 MG/1
40 TABLET, FILM COATED ORAL NIGHTLY
Qty: 90 TABLET | Refills: 0 | Status: SHIPPED | OUTPATIENT
Start: 2024-12-30

## 2024-12-31 ENCOUNTER — LAB (OUTPATIENT)
Dept: OTHER | Facility: HOSPITAL | Age: 74
End: 2024-12-31
Payer: MEDICARE

## 2024-12-31 DIAGNOSIS — D50.8 IRON DEFICIENCY ANEMIA SECONDARY TO INADEQUATE DIETARY IRON INTAKE: ICD-10-CM

## 2024-12-31 LAB
BASOPHILS # BLD AUTO: 0.08 10*3/MM3 (ref 0–0.2)
BASOPHILS NFR BLD AUTO: 0.8 % (ref 0–1.5)
DEPRECATED RDW RBC AUTO: 51.1 FL (ref 37–54)
EOSINOPHIL # BLD AUTO: 0.21 10*3/MM3 (ref 0–0.4)
EOSINOPHIL NFR BLD AUTO: 2 % (ref 0.3–6.2)
ERYTHROCYTE [DISTWIDTH] IN BLOOD BY AUTOMATED COUNT: 16 % (ref 12.3–15.4)
FERRITIN SERPL-MCNC: 216.1 NG/ML (ref 13–150)
HCT VFR BLD AUTO: 37.6 % (ref 34–46.6)
HGB BLD-MCNC: 11.9 G/DL (ref 12–15.9)
IMM GRANULOCYTES # BLD AUTO: 0.04 10*3/MM3 (ref 0–0.05)
IMM GRANULOCYTES NFR BLD AUTO: 0.4 % (ref 0–0.5)
IRON 24H UR-MRATE: 33 MCG/DL (ref 37–145)
IRON SATN MFR SERPL: 11 % (ref 20–50)
LYMPHOCYTES # BLD AUTO: 1.8 10*3/MM3 (ref 0.7–3.1)
LYMPHOCYTES NFR BLD AUTO: 17.4 % (ref 19.6–45.3)
MCH RBC QN AUTO: 27.5 PG (ref 26.6–33)
MCHC RBC AUTO-ENTMCNC: 31.6 G/DL (ref 31.5–35.7)
MCV RBC AUTO: 87 FL (ref 79–97)
MONOCYTES # BLD AUTO: 0.89 10*3/MM3 (ref 0.1–0.9)
MONOCYTES NFR BLD AUTO: 8.6 % (ref 5–12)
NEUTROPHILS NFR BLD AUTO: 7.3 10*3/MM3 (ref 1.7–7)
NEUTROPHILS NFR BLD AUTO: 70.8 % (ref 42.7–76)
NRBC BLD AUTO-RTO: 0 /100 WBC (ref 0–0.2)
PLATELET # BLD AUTO: 185 10*3/MM3 (ref 140–450)
PMV BLD AUTO: 11.6 FL (ref 6–12)
RBC # BLD AUTO: 4.32 10*6/MM3 (ref 3.77–5.28)
TIBC SERPL-MCNC: 307 MCG/DL (ref 298–536)
TRANSFERRIN SERPL-MCNC: 206 MG/DL (ref 200–360)
WBC NRBC COR # BLD AUTO: 10.32 10*3/MM3 (ref 3.4–10.8)

## 2024-12-31 PROCEDURE — 84466 ASSAY OF TRANSFERRIN: CPT

## 2024-12-31 PROCEDURE — 82728 ASSAY OF FERRITIN: CPT | Performed by: INTERNAL MEDICINE

## 2024-12-31 PROCEDURE — 84238 ASSAY NONENDOCRINE RECEPTOR: CPT | Performed by: INTERNAL MEDICINE

## 2024-12-31 PROCEDURE — 83540 ASSAY OF IRON: CPT

## 2024-12-31 PROCEDURE — 85025 COMPLETE CBC W/AUTO DIFF WBC: CPT | Performed by: INTERNAL MEDICINE

## 2024-12-31 PROCEDURE — 36415 COLL VENOUS BLD VENIPUNCTURE: CPT

## 2025-01-03 PROBLEM — T45.4X5A ADVERSE EFFECT OF IRON: Status: ACTIVE | Noted: 2025-01-03

## 2025-01-03 RX ORDER — HYDROCORTISONE SODIUM SUCCINATE 100 MG/2ML
100 INJECTION INTRAMUSCULAR; INTRAVENOUS AS NEEDED
Status: CANCELLED | OUTPATIENT
Start: 2025-01-07

## 2025-01-03 RX ORDER — FAMOTIDINE 20 MG/1
20 TABLET, FILM COATED ORAL ONCE
Status: CANCELLED | OUTPATIENT
Start: 2025-01-14

## 2025-01-03 RX ORDER — CETIRIZINE HYDROCHLORIDE 10 MG/1
10 TABLET ORAL ONCE
Status: CANCELLED | OUTPATIENT
Start: 2025-01-07

## 2025-01-03 RX ORDER — DIPHENHYDRAMINE HYDROCHLORIDE 50 MG/ML
50 INJECTION INTRAMUSCULAR; INTRAVENOUS AS NEEDED
Status: CANCELLED | OUTPATIENT
Start: 2025-01-14

## 2025-01-03 RX ORDER — CETIRIZINE HYDROCHLORIDE 10 MG/1
10 TABLET ORAL ONCE
Status: CANCELLED | OUTPATIENT
Start: 2025-01-14

## 2025-01-03 RX ORDER — FAMOTIDINE 10 MG/ML
20 INJECTION, SOLUTION INTRAVENOUS AS NEEDED
Status: CANCELLED | OUTPATIENT
Start: 2025-01-07

## 2025-01-03 RX ORDER — SODIUM CHLORIDE 9 MG/ML
20 INJECTION, SOLUTION INTRAVENOUS ONCE
Status: CANCELLED | OUTPATIENT
Start: 2025-01-14

## 2025-01-03 RX ORDER — FAMOTIDINE 20 MG/1
20 TABLET, FILM COATED ORAL ONCE
Status: CANCELLED | OUTPATIENT
Start: 2025-01-07

## 2025-01-03 RX ORDER — FAMOTIDINE 10 MG/ML
20 INJECTION, SOLUTION INTRAVENOUS AS NEEDED
Status: CANCELLED | OUTPATIENT
Start: 2025-01-14

## 2025-01-03 RX ORDER — SODIUM CHLORIDE 9 MG/ML
20 INJECTION, SOLUTION INTRAVENOUS ONCE
Status: CANCELLED | OUTPATIENT
Start: 2025-01-07

## 2025-01-03 RX ORDER — HYDROCORTISONE SODIUM SUCCINATE 100 MG/2ML
100 INJECTION INTRAMUSCULAR; INTRAVENOUS AS NEEDED
Status: CANCELLED | OUTPATIENT
Start: 2025-01-14

## 2025-01-03 RX ORDER — DIPHENHYDRAMINE HYDROCHLORIDE 50 MG/ML
50 INJECTION INTRAMUSCULAR; INTRAVENOUS AS NEEDED
Status: CANCELLED | OUTPATIENT
Start: 2025-01-07

## 2025-01-04 LAB — STFR SERPL-SCNC: 18.9 NMOL/L (ref 12.2–27.3)

## 2025-01-07 ENCOUNTER — APPOINTMENT (OUTPATIENT)
Dept: OTHER | Facility: HOSPITAL | Age: 75
End: 2025-01-07
Payer: MEDICARE

## 2025-01-07 ENCOUNTER — OFFICE VISIT (OUTPATIENT)
Dept: ONCOLOGY | Facility: CLINIC | Age: 75
End: 2025-01-07
Payer: MEDICARE

## 2025-01-07 ENCOUNTER — INFUSION (OUTPATIENT)
Dept: ONCOLOGY | Facility: HOSPITAL | Age: 75
End: 2025-01-07
Payer: MEDICARE

## 2025-01-07 VITALS
TEMPERATURE: 98.7 F | HEART RATE: 69 BPM | DIASTOLIC BLOOD PRESSURE: 88 MMHG | BODY MASS INDEX: 33.18 KG/M2 | WEIGHT: 180.3 LBS | RESPIRATION RATE: 16 BRPM | SYSTOLIC BLOOD PRESSURE: 159 MMHG | OXYGEN SATURATION: 94 % | HEIGHT: 62 IN

## 2025-01-07 VITALS — DIASTOLIC BLOOD PRESSURE: 78 MMHG | SYSTOLIC BLOOD PRESSURE: 144 MMHG

## 2025-01-07 DIAGNOSIS — T45.4X5A ADVERSE EFFECT OF IRON, INITIAL ENCOUNTER: ICD-10-CM

## 2025-01-07 DIAGNOSIS — K21.9 GASTROESOPHAGEAL REFLUX DISEASE WITHOUT ESOPHAGITIS: Primary | ICD-10-CM

## 2025-01-07 DIAGNOSIS — D50.9 IRON DEFICIENCY ANEMIA, UNSPECIFIED IRON DEFICIENCY ANEMIA TYPE: Primary | ICD-10-CM

## 2025-01-07 DIAGNOSIS — D50.8 IRON DEFICIENCY ANEMIA SECONDARY TO INADEQUATE DIETARY IRON INTAKE: ICD-10-CM

## 2025-01-07 PROCEDURE — 99214 OFFICE O/P EST MOD 30 MIN: CPT | Performed by: INTERNAL MEDICINE

## 2025-01-07 PROCEDURE — 1126F AMNT PAIN NOTED NONE PRSNT: CPT | Performed by: INTERNAL MEDICINE

## 2025-01-07 PROCEDURE — 63710000001 FAMOTIDINE 20 MG TABLET: Performed by: INTERNAL MEDICINE

## 2025-01-07 PROCEDURE — 25010000002 FERUMOXYTOL 510 MG/17ML SOLUTION 17 ML VIAL: Performed by: INTERNAL MEDICINE

## 2025-01-07 PROCEDURE — A9270 NON-COVERED ITEM OR SERVICE: HCPCS | Performed by: INTERNAL MEDICINE

## 2025-01-07 PROCEDURE — 96374 THER/PROPH/DIAG INJ IV PUSH: CPT

## 2025-01-07 PROCEDURE — 63710000001 CETIRIZINE 10 MG TABLET: Performed by: INTERNAL MEDICINE

## 2025-01-07 RX ORDER — CETIRIZINE HYDROCHLORIDE 10 MG/1
10 TABLET ORAL ONCE
Status: COMPLETED | OUTPATIENT
Start: 2025-01-07 | End: 2025-01-07

## 2025-01-07 RX ORDER — FAMOTIDINE 20 MG/1
20 TABLET, FILM COATED ORAL ONCE
Status: COMPLETED | OUTPATIENT
Start: 2025-01-07 | End: 2025-01-07

## 2025-01-07 RX ADMIN — CETIRIZINE HYDROCHLORIDE 10 MG: 10 TABLET, FILM COATED ORAL at 11:06

## 2025-01-07 RX ADMIN — FERUMOXYTOL 510 MG: 510 INJECTION INTRAVENOUS at 11:16

## 2025-01-07 RX ADMIN — FAMOTIDINE 20 MG: 20 TABLET ORAL at 11:06

## 2025-01-07 NOTE — PROGRESS NOTES
REASON FOR FOLLOW-UP: Multifactoral anemia including anemia of chronic disease and iron deficiency.    History of Present Illness   The patient is 73-year-old female followed with below medical history and seen by subspecialists including GI 3/23/2023 for GE reflux, gastroparesis and IBS as well as previous cholecystectomy with studies at that point including H&H 11.1 and 34.4 with MCV of 80.0 and MCH 25.8.  Her studies 4/3 include a ferritin at 18.4 and iron of 51.  At this point she was followed by cardiology seen 4/27/2023 with a history of nonobstructive coronary artery disease, sleep apnea on CPAP and  peripheral vascular disease with left lower extremity claudication.  She had been admitted to [facility] for epigastric discomfort undergoing nuclear stress test that was significant for possible infarct but no ischemia.  She is thought to have symptoms from GI origin and was relatively stable with plans to repeat echocardiogram.  This was obtained 5/10/2023 with LV SF of 57.8%, normal left ventricular cavity size and wall thickness, left ventricular diastolic function with grade 1 impaired relaxation.    She had further assessment 5/1/2023 with H&H 10.7 and 33.9, MCHC 25.4.    Patient was seen by primary care 10/6/2023 with leg cramps that were worsening treated symptomatically with electrolyte supplementation, PAD had improvement graded exercise with the patient unable to tolerate Pletal.  She was reviewed by orthopedics 10/12/2023 for her worsening foot pain and thought possibly to have an occult fracture or stress fracture.  Plain films demonstrated degenerative changes with calcaneal spurs but no acute fracture.    Repeat studies 10/20/2023 with H&H of 10.8 and 33.9, MCH of 25.5, MCV 80.1, platelet count 247,000, free T41.01, TSH 1.680.  Her further cardiac exams were unremarkable 11/2/2023 of the patient continued to have fatigue and had respiratory issues treated by primary care 11/8/2023  with Mucinex and Flonase, subsequent reevaluation 11/13 by immediate care treated with prednisone and Zithromax.    We are asked to see the patient for her anemia and she is seen in office 11/28/2023.  We discussed her findings today in particular her variable anemia which is improved in office 11/28/2023.      After discussion she will be sent back to laboratory obtaining CMP, EPO level, MATEHUS, PE, free serum light chains, MMA, sed rate, B12 level, MATHEUS, PE, free serum light chains.  She had been in her primary care's office today undergoing TSH, free T4, hemoglobin A1c, ferritin, iron profile as well.    Her studies included a CMP with blood glucose of 163, otherwise normal, total cholesterol 149, triglycerides 341, HDL 39, ferritin of 31.9, iron profile at 50% saturation, hemoglobin A1c of 8.0, TSH 0.677, free T41.15, B12 of 551, MMA of 114, IRF of 19.8, reticulocyte percentage 1.48, reticulocyte hemoglobin 31.2, paraprotein analysis with no monoclonal spike, free light chain kappa light 22.7, free lambda light chain 19.1 and kappa/lambda ratio of 1.19, EPO level 24.3, ESR 10, negative NEW comprehensive profile.  Please note GI workup including previous colonoscopy.    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  She is reassessed in July now with evidence of iron deficiency and a trial of oral iron was not tolerable.  Subsequent efforts to obtain more tolerable oral iron with Accrufer though this was not available until the last several days when the patient is seen 10/15/2024.      She was asked to return with studies 10/8/2024 H&H of 10.5 and 34.4, MCV of 81.9 and MCH of 25.0, ferritin of 15.3 and iron profile with 11% saturation.At this point the patient is better treated with IV iron.    The patient went on to be treated 10/15 and 10/22/2024 for total dose of 1020 mg Feraheme.  She is next seen 1/6 with  studies 12/21 including 11% iron saturation, ferritin now up to 16, soluble transferrin receptor 18.9 and CBC with H&H of 0.9 and 37.6 with white count 10,003 and 20 and platelet count of 185,000.  Unfortunately she remains still quite weak and fatigued despite her hemoglobin improving and we have just turned and that she would be further treated with additional IV Feraheme today x 1.  She is also having right upper quadrant pain which led to an MRI of the abdomen 12/17/2024 that, again, demonstrates extensive fatty filtration of liver, several hepatic cysts, no suspicious liver lesions nor intrahepatic or  extrahepatic biliary dilatation.  Additionally splenic size is normal is no lymphadenopathy no abnormality pancreas or adrenals.  Considering continued anemia and right upper quadrant pain follow-up with GI consultation be requested and likely colonoscopy.    Past Medical History:   Diagnosis Date    Allergic     Anemia     Anxiety     Arthritis     Arthritis of back     Arthritis of neck     CAD (coronary artery disease)     50% mid LAD by cath on 8/14/2017    Chronic pain disorder     Depression     Diabetes mellitus     Diverticulosis     Fatty liver 2018    GERD (gastroesophageal reflux disease)     H/O Anal fissure     Headache     Headache, tension-type     Hip arthrosis     Hyperlipidemia     Hypertension     Hypothyroidism     Irritable bowel syndrome     Knee swelling     Low back pain     Low back strain     Neuropathy in diabetes     Obesity     ALEXANDER (obstructive sleep apnea)     PAD (peripheral artery disease)     Stress fracture         Past Surgical History:   Procedure Laterality Date    ADENOIDECTOMY      CARDIAC CATHETERIZATION Left [facility]/14/2017    [facility]    CHOLECYSTECTOMY      COLONOSCOPY  2015    Diverticulosis, hemorrhoids    COLONOSCOPY W/ POLYPECTOMY  2020    ENDOSCOPY  2016    FOOT SURGERY      FRACTURE SURGERY      Stress fracture about 4 yrs ago    GALLBLADDER SURGERY  1991    HAND  SURGERY      RECTOVAGINAL FISTULA REPAIR      SACROILIAC JOINT INJECTION Left 01/31/2024    Procedure: SACROILIAC JOINT INJECTION LEFT 18429;  Surgeon: Rhona Hammer MD;  Location: [facility];  Service: Pain Management;  Laterality: Left;    STEROID INJECTION HIP Left 11/21/2022    Procedure: HIP INJECTION UNDER FLUORO - left;  Surgeon: Rhona Hammer MD;  Location: [facility];  Service: Pain Management;  Laterality: Left;    TONSILLECTOMY      TRIGGER FINGER RELEASE      TRIGGER POINT INJECTION      VULVA BIOPSY      11/2021 Colonoscopy by Dr Hurley- rectal polyp    Current Outpatient Medications on File Prior to Visit   Medication Sig Dispense Refill    Acetaminophen (TYLENOL PO) Take 650 mg by mouth 2 (Two) Times a Day As Needed.      amLODIPine (NORVASC) 5 MG tablet Take 1 tablet by mouth once daily 90 tablet 1    aspirin 81 MG EC tablet Take 1 tablet by mouth Daily.      atorvastatin (LIPITOR) 40 MG tablet TAKE 1 TABLET BY MOUTH ONCE DAILY AT NIGHT 90 tablet 0    carvedilol (COREG) 3.125 MG tablet Take 1 tablet by mouth twice daily 60 tablet 0    cetirizine (zyrTEC) 5 MG tablet Take 1 tablet by mouth Daily.      clobetasol (TEMOVATE) 0.05 % cream Apply 1 application topically to the appropriate area as directed every night at bedtime. 45 g 0    colestipol (COLESTID) 1 g tablet Take 1 tablet by mouth 2 (Two) Times a Day.      Diclofenac Sodium (VOLTAREN) 1 % gel gel Apply 4 g topically to the appropriate area as directed 4 (Four) Times a Day.      dicyclomine (BENTYL) 10 MG capsule Take 1 capsule by mouth 3 (Three) Times a Day As Needed for Abdominal Cramping. 90 capsule 0    empagliflozin (JARDIANCE) 10 MG tablet tablet Take 1 tablet by mouth Daily. 90 tablet 1    escitalopram (LEXAPRO) 20 MG tablet Take 1 tablet by mouth Daily. 90 tablet 1    esomeprazole (nexIUM) 40 MG capsule TAKE 1 CAPSULE BY MOUTH ONCE DAILY IN THE MORNING BEFORE BREAKFAST 90 capsule 0    estradiol (ESTRACE) 0.1 MG/GM vaginal  cream       famotidine (PEPCID) 40 MG tablet Take 1 tablet by mouth At Night As Needed for Heartburn. 30 tablet 5    gabapentin (NEURONTIN) 300 MG capsule TAKE 1 CAPSULE BY MOUTH THREE TIMES DAILY 270 capsule 2    icosapent ethyl (Vascepa) 1 g capsule capsule Take 2 g by mouth 2 (Two) Times a Day With Meals. 120 capsule 11    levothyroxine (SYNTHROID, LEVOTHROID) 50 MCG tablet Take 1 tablet by mouth once daily 90 tablet 1    losartan (Cozaar) 25 MG tablet Take 1 tablet by mouth Daily. 90 tablet 0    methocarbamol (ROBAXIN) 500 MG tablet Take 1 tablet by mouth 4 (Four) Times a Day.      multivitamin with minerals tablet tablet Take 1 tablet by mouth Daily.      nystatin (MYCOSTATIN) 678407 UNIT/GM ointment Apply 1 application topically to the appropriate area as directed 2 (Two) Times a Day. 30 g 0    SITagliptin-metFORMIN HCl ER (Janumet XR) 100-1000 MG tablet Take 1 tablet by mouth Daily With Dinner. 90 tablet 1    tiZANidine (ZANAFLEX) 2 MG tablet Take 1 or 2 tablets PO QHS tolerated 60 tablet 1     No current facility-administered medications on file prior to visit.        ALLERGIES:    Allergies   Allergen Reactions    Lidocaine Other (See Comments)     Does not work on pt    Keflex [Cephalexin] Rash    Penicillins Rash        Social History     Socioeconomic History    Marital status:      Spouse name: Haresh    Number of children: 2   Tobacco Use    Smoking status: Former     Current packs/day: 0.00     Average packs/day: 1 pack/day for 30.0 years (30.0 ttl pk-yrs)     Types: Cigarettes     Start date: 1965     Quit date: 1991     Years since quittin.6     Passive exposure: Past    Smokeless tobacco: Never    Tobacco comments:     quit in her 40's   Vaping Use    Vaping status: Never Used   Substance and Sexual Activity    Alcohol use: Not Currently     Comment: 1 drink every blue moon    Drug use: Never    Sexual activity: Not Currently     Partners: Male     Birth control/protection: None  "       Family History   Problem Relation Age of Onset    Heart disease Mother     Hypertension Mother     COPD Mother     Alcohol abuse Brother     Colon cancer Neg Hx     Colon polyps Neg Hx     Crohn's disease Neg Hx     Irritable bowel syndrome Neg Hx     Ulcerative colitis Neg Hx         Review of Systems   Constitutional:  Positive for diaphoresis and fatigue. Negative for fever and unexpected weight change.   HENT:  Positive for mouth sores (Dry mouth).    Eyes: Negative.    Respiratory:  Positive for cough and shortness of breath.         Recent URI   Cardiovascular:  Positive for palpitations (Recent holter).   Gastrointestinal:  Positive for abdominal pain and diarrhea.        Fatty food intolrance   Endocrine: Negative.    Genitourinary:  Positive for frequency.   Musculoskeletal:  Positive for myalgias (night cramps).   Neurological: Negative.    Hematological: Negative.    Psychiatric/Behavioral: Negative.          Objective     Vitals:    01/07/25 1033   BP: 159/88   Pulse: 69   Resp: 16   Temp: 98.7 °F (37.1 °C)   TempSrc: Oral   SpO2: 94%   Weight: 81.8 kg (180 lb 4.8 oz)   Height: 157 cm (61.81\")   PainSc: 0-No pain               1/7/2025    10:35 AM   Current Status   ECOG score 0       Physical Exam  Constitutional:       Appearance: She is obese.   HENT:      Head: Normocephalic and atraumatic.      Nose: Nose normal.      Mouth/Throat:      Mouth: Mucous membranes are moist.      Pharynx: Oropharynx is clear.   Eyes:      Extraocular Movements: Extraocular movements intact.      Conjunctiva/sclera: Conjunctivae normal.      Pupils: Pupils are equal, round, and reactive to light.   Cardiovascular:      Rate and Rhythm: Normal rate and regular rhythm.      Pulses: Normal pulses.      Heart sounds: Normal heart sounds.   Pulmonary:      Effort: Pulmonary effort is normal.      Breath sounds: Normal breath sounds.   Abdominal:      General: Bowel sounds are normal.      Palpations: Abdomen is soft. "   Musculoskeletal:         General: Normal range of motion.      Cervical back: Normal range of motion and neck supple.   Skin:     General: Skin is warm and dry.   Neurological:      General: No focal deficit present.      Mental Status: She is oriented to person, place, and time.   Psychiatric:         Mood and Affect: Mood normal.         Behavior: Behavior normal.           RECENT LABS:  Hematology WBC   Date Value Ref Range Status   12/31/2024 10.32 3.40 - 10.80 10*3/mm3 Final   04/13/2024 8.0 3.4 - 10.8 x10E3/uL Final   05/01/2023 7.49 4.5 - 11.0 10*3/uL Final     RBC   Date Value Ref Range Status   12/31/2024 4.32 3.77 - 5.28 10*6/mm3 Final   04/13/2024 4.59 3.77 - 5.28 x10E6/uL Final   05/01/2023 4.22 4.0 - 5.2 10*6/uL Final     Hemoglobin   Date Value Ref Range Status   12/31/2024 11.9 (L) 12.0 - 15.9 g/dL Final   05/01/2023 10.7 (L) 12.0 - 16.0 g/dL Final     Hematocrit   Date Value Ref Range Status   12/31/2024 37.6 34.0 - 46.6 % Final   05/01/2023 33.9 (L) 36.0 - 46.0 % Final     Platelets   Date Value Ref Range Status   12/31/2024 185 140 - 450 10*3/mm3 Final   05/01/2023 233 140 - 440 10*3/uL Final          Assessment & Plan     74-year-old female with a history including GE reflux, gastroparesis, IBS, previous cholecystectomy, nonobstructive coronary artery disease, peripheral vascular disease, worsening leg cramps found to have variable anemia over the last several visits.  On occasion this has been thought to be iron deficient and she has been treated with oral iron on previous occasions.  Peripheral smear in office today reveals normocytic normochromic RBCs with mild polychromatophilia, microcytosis and hypochromia.  Leukocytes appear normal per number differential, platelets appear normal per number and size.    The patient's anemia is not progressive, and is suspected to be related to her chronic illnesses, but we do need to assess to try to determine whether we could improve this for  her.    After discussion she will be sent back to laboratory obtaining CMP, EPO level, MATHEUS, PE, free serum light chains, MMA, sed rate, B12 level, MATHEUS, PE, free serum light chains.  She had been in her primary care's office today undergoing TSH, free T4, hemoglobin A1c, ferritin, iron profile as well.    Her studies included a CMP with blood glucose of 163, otherwise normal, total cholesterol 149, triglycerides 341, HDL 39, ferritin of 31.9, iron profile at 50% saturation, hemoglobin A1c of 8.0, TSH 0.677, free T41.15, B12 of 551, MMA of 114, IRF of 19.8, reticulocyte percentage 1.48, reticulocyte hemoglobin 31.2, paraprotein analysis with no monoclonal spike, free light chain kappa light 22.7, free lambda light chain 19.1 and kappa/lambda ratio of 1.19, EPO level 24.3, ESR 10, negative NEW comprehensive profile.    The patient is seen 1/9/2024 with modest improvement in her hemoglobin hematocrit no significant abnormalities on additional testing.  There is likely a component of anemia of chronic disease present no additional intervention is not yet felt necessary.    She was reassessed in July now with evidence of iron deficiency and a trial of oral iron was not tolerable.  Subsequent efforts to obtain more tolerable oral iron with Accrufer though this was not available until the last several days when the patient is seen 10/15/2024.      She was asked to return with studies 10/8/2024 H&H of 10.5 and 34.4, MCV of 81.9 and MCH of 25.0, ferritin of 15.3 and iron profile with 11% saturation.At this point the patient is better treated with IV iron.    The patient went on to be treated 10/15 and 10/22/2024 for total dose of 1020 mg Feraheme.    She is next seen 1/6 with studies 12/21 including 11% iron saturation, ferritin now up to 16, soluble transferrin receptor 18.9 and CBC with H&H of 0.9 and 37.6 with white count 10,003 and 20 and platelet count of 185,000.  Unfortunately she remains still quite weak and fatigued  despite her hemoglobin improving and we have just turned and that she would be further treated with additional IV Feraheme today x 1.  She is also having right upper quadrant pain which led to an MRI of the abdomen 12/17/2024 that, again, demonstrates extensive fatty filtration of liver, several hepatic cysts, no suspicious liver lesions nor intrahepatic or  extrahepatic biliary dilatation.  Additionally splenic size is normal is no lymphadenopathy no abnormality pancreas or adrenals.  Considering continued anemia and right upper quadrant pain follow-up with GI consultation be requested and likely colonoscopy.    Plan:  *Proceed with Feraheme today-510 mg    *GI referral-?  Repeat colonoscopy.  She follows with GI routinely    *11 weeks-CBC, ferritin, iron profile, soluble transferrin receptor    *12 weeks-MD, possible further  Feraheme

## 2025-01-08 ENCOUNTER — PATIENT OUTREACH (OUTPATIENT)
Dept: CASE MANAGEMENT | Facility: OTHER | Age: 75
End: 2025-01-08
Payer: MEDICARE

## 2025-01-08 PROBLEM — I70.212 ATHEROSCLEROSIS OF NATIVE ARTERY OF LEFT LOWER EXTREMITY WITH INTERMITTENT CLAUDICATION: Status: ACTIVE | Noted: 2025-01-08

## 2025-01-08 NOTE — PROGRESS NOTES
"Chief Complaint  No chief complaint on file.    Subjective  {Problem List  Visit Diagnosis   Encounters  Notes  Medications  Labs  Result Review Imaging  Media :23}    HPI: Pearl Christiansen is a 74 y.o. female returns for evaluation of peripheral arterial disease of the lower extremities    Objective   Vital Signs:  There were no vitals taken for this visit.  Estimated body mass index is 33.18 kg/m² as calculated from the following:    Height as of 1/7/25: 157 cm (61.81\").    Weight as of 1/7/25: 81.8 kg (180 lb 4.8 oz).        Pearl Christiansen  reports that she quit smoking about 33 years ago. Her smoking use included cigarettes. She started smoking about 60 years ago. She has a 30 pack-year smoking history. She has been exposed to tobacco smoke. She has never used smokeless tobacco.. {Tobacco Education/Cessation (Optional):01706}    Exam: ***    Personal review of data: Images and tracings of bilateral lower extremity ABIs performed today.     Assessment and Plan {CC Problem List  Visit Diagnosis   ROS  Review (Popup)  Health Maintenance  Quality  BestPractice  Medications  SmartSets  SnapShot Encounters  Media :23}    Diagnoses and all orders for this visit:    1. Atherosclerosis of native artery of left lower extremity with intermittent claudication (Primary)    Summary: 74-year-old woman with peripheral arterial disease of the left lower extremity, returns for follow-up.  Takes aspirin and atorvastatin.  Former smoker.  Previous noninvasive testing included normal right PAYTON 0.99, and left PAYTON 0.69, indicating moderate ischemia.    Follow Up {Instructions Charge Capture  Follow-up Communications :23}    No follow-ups on file.  Patient was given instructions and counseling regarding her condition or for health maintenance advice. Please see specific information pulled into the AVS if appropriate.   "

## 2025-01-08 NOTE — OUTREACH NOTE
"AMBULATORY CASE MANAGEMENT NOTE    Names and Relationships of Patient/Support Persons: Contact: Pearl Christiansen \"Ramiro\"; Relationship: Self -     Patient Outreach  RN-ACM outreach with patient. Patient states to be unable to talk at this time and requests RN-ACM call tomorrow. Additional outreach scheduled.     Nallely VEGA  Ambulatory Case Management    1/8/2025, 16:30 EST  "

## 2025-01-09 ENCOUNTER — PATIENT OUTREACH (OUTPATIENT)
Dept: CASE MANAGEMENT | Facility: OTHER | Age: 75
End: 2025-01-09
Payer: MEDICARE

## 2025-01-09 NOTE — OUTREACH NOTE
"AMBULATORY CASE MANAGEMENT NOTE    Names and Relationships of Patient/Support Persons: Contact: Pearl Christiansen QIAN \"Ramiro\"; Relationship: Self -     Patient Outreach  RN-ACM outreach with patient. Patient states to be compliant with medications; taking Jardiance as directed; stating to be covered by insurance. Patient states to be compliant with medications; appointment and monitoring of blood pressure and blood sugars. Patient states no difficulty with fever; chest pain; SOB; appetite or sleeping. Reviewed with patient education and verbalized understanding. Patient states to appreciate outreach and requests additional outreach.  No further questions voiced at this time.     Education Documentation  Unresolved/Worsening Symptoms, taught by Nallely Park RN at 1/9/2025  3:47 PM.  Learner: Patient  Readiness: Acceptance  Method: Explanation  Response: Verbalizes Understanding    Blood Pressure Monitoring, taught by Nallely Park RN at 1/9/2025  3:47 PM.  Learner: Patient  Readiness: Acceptance  Method: Explanation  Response: Verbalizes Understanding    Follow-Up Care, taught by Nallely Park RN at 1/9/2025  3:47 PM.  Learner: Patient  Readiness: Acceptance  Method: Explanation  Response: Verbalizes Understanding    Healthy Food Choices, taught by Nallely Park RN at 1/9/2025  3:47 PM.  Learner: Patient  Readiness: Acceptance  Method: Explanation  Response: Verbalizes Understanding    Blood Glucose Monitoring, taught by Nallely Park RN at 1/9/2025  3:47 PM.  Learner: Patient  Readiness: Acceptance  Method: Explanation  Response: Verbalizes Understanding          Nallely VEGA  Ambulatory Case Management    1/9/2025, 15:47 EST  "

## 2025-01-10 ENCOUNTER — OFFICE VISIT (OUTPATIENT)
Age: 75
End: 2025-01-10
Payer: MEDICARE

## 2025-01-10 ENCOUNTER — HOSPITAL ENCOUNTER (OUTPATIENT)
Facility: HOSPITAL | Age: 75
Discharge: HOME | End: 2025-01-10
Payer: MEDICARE

## 2025-01-10 VITALS
TEMPERATURE: 97.6 F | WEIGHT: 180.1 LBS | HEIGHT: 62 IN | RESPIRATION RATE: 16 BRPM | HEART RATE: 60 BPM | SYSTOLIC BLOOD PRESSURE: 144 MMHG | DIASTOLIC BLOOD PRESSURE: 75 MMHG | BODY MASS INDEX: 33.14 KG/M2 | OXYGEN SATURATION: 93 %

## 2025-01-10 DIAGNOSIS — I73.9 PERIPHERAL ARTERIAL DISEASE: ICD-10-CM

## 2025-01-10 DIAGNOSIS — E11.65 TYPE 2 DIABETES MELLITUS WITH HYPERGLYCEMIA, WITHOUT LONG-TERM CURRENT USE OF INSULIN: ICD-10-CM

## 2025-01-10 DIAGNOSIS — I65.23 CAROTID STENOSIS, BILATERAL: ICD-10-CM

## 2025-01-10 DIAGNOSIS — I70.212 ATHEROSCLEROSIS OF NATIVE ARTERY OF LEFT LOWER EXTREMITY WITH INTERMITTENT CLAUDICATION: Primary | ICD-10-CM

## 2025-01-10 LAB
BH CV LOWER ARTERIAL LEFT ABI RATIO: 0.89
BH CV LOWER ARTERIAL LEFT DORSALIS PEDIS SYS MAX: 100
BH CV LOWER ARTERIAL LEFT POST TIBIAL SYS MAX: 124
BH CV LOWER ARTERIAL RIGHT ABI RATIO: 1
BH CV LOWER ARTERIAL RIGHT DORSALIS PEDIS SYS MAX: 136
BH CV LOWER ARTERIAL RIGHT POST TIBIAL SYS MAX: 140
UPPER ARTERIAL LEFT ARM BRACHIAL SYS MAX: 140
UPPER ARTERIAL RIGHT ARM BRACHIAL SYS MAX: 130

## 2025-01-10 PROCEDURE — 93922 UPR/L XTREMITY ART 2 LEVELS: CPT

## 2025-01-10 NOTE — PROGRESS NOTES
Chief Complaint  Carotid Artery Disease    Subjective        Pearl Christiansen presents to [facility] VASCULAR SURGERY  HPI   Pearl Christiansen is a 74 y.o. female that has been followed in our office for peripheral arterial disease. She returns today in follow up along with ABIs. She reports she  has been doing well without hospitalizations or surgeries. She denies any worsening claudication symptoms, rest pain, or tissue loss.  She was previously on cilostazol for claudication, though this was discontinued due to diarrhea.  Reviewing her chart, she did have a carotid duplex done in June 2024 for word finding difficulty.  She had no significant carotid stenosis bilaterally.    Review of Systems   Constitutional:  Negative for fever.   Eyes:  Negative for visual disturbance.   Cardiovascular:  Negative for leg swelling.   Gastrointestinal:  Negative for abdominal pain.   Musculoskeletal:  Negative for back pain.   Skin:  Negative for color change, pallor and wound.   Neurological:  Negative for dizziness, facial asymmetry, speech difficulty and weakness.        Pearl Christiansen  reports that she quit smoking about 33 years ago. Her smoking use included cigarettes. She started smoking about 60 years ago. She has a 30 pack-year smoking history. She has been exposed to tobacco smoke. She has never used smokeless tobacco..        Objective   Vital Signs:  Vitals:    01/10/25 1001   BP: 144/75   Pulse: 60   Resp: 16   Temp: 97.6 °F (36.4 °C)   SpO2: 93%      Body mass index is 33.14 kg/m².           Physical Exam  Vitals reviewed.   Constitutional:       Appearance: Normal appearance.   HENT:      Head: Normocephalic.   Cardiovascular:      Rate and Rhythm: Normal rate and regular rhythm.      Pulses: Normal pulses.           Dorsalis pedis pulses are 3+ on the right side and 3+ on the left side.        Posterior tibial pulses are 3+ on the right side and 3+ on the left side.   Pulmonary:      Effort: Pulmonary  effort is normal.   Skin:     General: Skin is warm.   Neurological:      General: No focal deficit present.      Mental Status: She is alert and oriented to person, place, and time.   Psychiatric:         Mood and Affect: Mood normal.          Result Review :    ABIs from last year: Greater than 1 on the right 0.88 on the left    Doppler Arterial Lower Extremity Stress CAR (04/07/2023 15:49)     ABIs from today: Doppler Ankle Brachial Index Single Level CAR (01/10/2025 10:02)     Duplex Carotid Ultrasound CAR (06/18/2024 09:26)     Hemoglobin A1c (11/19/2024 10:07)                    Assessment and Plan     Diagnoses and all orders for this visit:    1. Atherosclerosis of native artery of left lower extremity with intermittent claudication (Primary)  -     Doppler Ankle Brachial Index Single Level CAR; Future    2. Carotid stenosis, bilateral  -     Duplex Carotid Ultrasound CAR; Future    3. Type 2 diabetes mellitus with hyperglycemia, without long-term current use of insulin          Patient presents today for follow up of her peripheral arterial disease.  Today, her ABIs are stable from previous imaging.  She  is to continue her antiplatelet agent which is aspirin. She is to continue her statin for cholesterol control.  We discussed adequate blood pressure management.  She reports her diabetes has not been well-controlled and she is working with her primary care provider to bring this down.  We discussed continuing her walking protocol. She will return in 1 year along with ABIs.  We will also check a carotid duplex as well.         Follow Up     Return in about 1 year (around 1/10/2026) for carotid duplex, faith.  Patient was given instructions and counseling regarding her condition or for health maintenance advice. Please see specific information pulled into the AVS if appropriate.     VELVET Parra

## 2025-01-23 RX ORDER — ESOMEPRAZOLE MAGNESIUM 40 MG/1
40 CAPSULE, DELAYED RELEASE ORAL
Qty: 90 CAPSULE | Refills: 0 | Status: SHIPPED | OUTPATIENT
Start: 2025-01-23

## 2025-01-27 ENCOUNTER — TELEPHONE (OUTPATIENT)
Dept: FAMILY MEDICINE CLINIC | Facility: CLINIC | Age: 75
End: 2025-01-27
Payer: MEDICARE

## 2025-01-27 NOTE — TELEPHONE ENCOUNTER
Called patient. Patient stated that she would try to call her GYN before making a appt here. Patient stated that she would call back if they could not assist her.

## 2025-01-27 NOTE — TELEPHONE ENCOUNTER
"    Caller: Pearl Christiansen \"Ramiro\"    Relationship: Self    Best call back number: [phone]     What medication are you requesting: SOMETHING FOR YEAST INFECTION, PILL AND CREAM    What are your current symptoms: YEAST INTERNAL AND BETWEEN THIGHS    How long have you been experiencing symptoms: 2 DAYS    Have you had these symptoms before:    [x] Yes  [] No    Have you been treated for these symptoms before:   [x] Yes  [] No    If a prescription is needed, what is your preferred pharmacy and phone number: [facility] [address] [place], [place] - [address] [facility] [phone] [facility] [phone]      Additional notes:  HAD SAME ISSUE BEFORE      "

## 2025-01-28 ENCOUNTER — TELEPHONE (OUTPATIENT)
Dept: FAMILY MEDICINE CLINIC | Facility: CLINIC | Age: 75
End: 2025-01-28

## 2025-01-28 NOTE — TELEPHONE ENCOUNTER
Pt called regarding her /68 pt has taking her medication for the day. She went to see her gynecologist today and was told she has a bad yeast infection and lichen. She was told to use nystatin and triamcinolone. Pt not having any symptoms w/her bp.

## 2025-01-29 NOTE — TELEPHONE ENCOUNTER
Called pt to check to see how her bp running she said better. She wanted to mention the gynecologist said she believes empagliflozin (JARDIANCE) 10 MG tablet tablet is what caused the yeast infection and that she susceptible. Inquiring if she can take another medication.

## 2025-01-30 NOTE — TELEPHONE ENCOUNTER
"Pt adv she contacted insurance munjaro is covered but is still quite expensive - insurance told her if someone called from our office and spw/ them we could start a \"care exception\" so the price would be lower please adv  "

## 2025-01-30 NOTE — TELEPHONE ENCOUNTER
We do not do tier exceptions because we never win them and they are timely. There has to be medical justification why patient can not use their preferred drugs and then an explanation of why they have to have the higher tier drug. Beyond this, I would not know what else is involved in this process.

## 2025-01-31 NOTE — TELEPHONE ENCOUNTER
Spoke with patient in detail. Voiced understanding. States will call her insurance company and ask.     HUB on call back transfer to office to speak with this nurse  OFFICE find this nurse on call back. TY

## 2025-02-03 NOTE — TELEPHONE ENCOUNTER
Called number provided from patient [phone]. Spoke with the about care exception for this patient due to her not being able to Jardiance or any drug in that class. Submitted claim. Has a 72 hour turn around time. Reference # [id number]

## 2025-02-03 NOTE — TELEPHONE ENCOUNTER
PER IC FROM HUB   Patient calling back to speak with L[facility]PE[facility]. Informed patient LPN will give callback. Patient understood.

## 2025-02-04 DIAGNOSIS — Z87.19 HISTORY OF IBS: ICD-10-CM

## 2025-02-04 RX ORDER — CARVEDILOL 3.12 MG/1
3.12 TABLET ORAL 2 TIMES DAILY
Qty: 60 TABLET | Refills: 0 | Status: SHIPPED | OUTPATIENT
Start: 2025-02-04

## 2025-02-04 RX ORDER — DICYCLOMINE HYDROCHLORIDE 10 MG/1
CAPSULE ORAL
Qty: 90 CAPSULE | Refills: 0 | Status: SHIPPED | OUTPATIENT
Start: 2025-02-04

## 2025-02-04 NOTE — TELEPHONE ENCOUNTER
Rx Refill Note  Requested Prescriptions     Pending Prescriptions Disp Refills    dicyclomine (BENTYL) 10 MG capsule [Pharmacy Med Name: Dicyclomine HCl 10 MG Oral Capsule] 90 capsule 0     Sig: TAKE 1 CAPSULE BY MOUTH THREE TIMES DAILY AS NEEDED FOR  ABDOMINAL  CRAMPING      Last office visit with prescribing clinician: 11/8/2024   Last telemedicine visit with prescribing clinician: Visit date not found   Next office visit with prescribing clinician: Visit date not found                         Would you like a call back once the refill request has been completed: [] Yes [] No    If the office needs to give you a call back, can they leave a voicemail: [] Yes [] No    Nikole Causey MA  02/04/25, 11:17 EST

## 2025-02-11 ENCOUNTER — TELEPHONE (OUTPATIENT)
Dept: FAMILY MEDICINE CLINIC | Facility: CLINIC | Age: 75
End: 2025-02-11
Payer: MEDICARE

## 2025-02-11 DIAGNOSIS — E03.8 OTHER SPECIFIED HYPOTHYROIDISM: ICD-10-CM

## 2025-02-11 DIAGNOSIS — I10 HYPERTENSION, UNSPECIFIED TYPE: ICD-10-CM

## 2025-02-11 DIAGNOSIS — Z11.59 NEED FOR HEPATITIS C SCREENING TEST: ICD-10-CM

## 2025-02-11 DIAGNOSIS — E78.00 PURE HYPERCHOLESTEROLEMIA: Primary | ICD-10-CM

## 2025-02-11 DIAGNOSIS — E11.65 TYPE 2 DIABETES MELLITUS WITH HYPERGLYCEMIA, WITHOUT LONG-TERM CURRENT USE OF INSULIN: ICD-10-CM

## 2025-02-11 NOTE — TELEPHONE ENCOUNTER
"  Caller: Pearl Christiansen \"Ramiro\"    Relationship: Self    Best call back number: [phone]     What was the call regarding: PATIENT IS REQUESTING TO SPEAK TO SLAVA ABOUT THE MONJAURO MEDICATION, SHE STATES THE INSURANCE APPROVED THE PRIOR AUTHORIZATION. SHE WOULD LIKE TO KNOW IF THIS IS PRESCRIBED DOES SHE CONTINUE TAKING THE JANUMET AND JARIDANCE   "

## 2025-02-13 RX ORDER — METFORMIN HYDROCHLORIDE 500 MG/1
TABLET, EXTENDED RELEASE ORAL
Qty: 180 TABLET | Refills: 1 | Status: SHIPPED | OUTPATIENT
Start: 2025-02-13

## 2025-02-18 NOTE — PROGRESS NOTES
The patient has a pain history of the following:  Chronic Left hip pain  Left hip osteoarthritis   Left sacroiliac joint pain     Previous interventions that the patient has received include:   Left sacroiliac joint injection 1/31/2024 ->50% relief at 6-week follow-up  Left Hip injection 11/21/22 -50% relief for 1.5 years  Wrist injection  Elbow injection  Bilateral hip injections      Pain medications include:  Tylenol   Gabapentin  Tizanidine     Previously: Meloxicam (stomach upset), Methocarbamol PRN, Celebrex     Other conservative modalities which the patient reports using include:  Physical Therapy: no  Chiropractor: no  Massage Therapy: no  TENS: no  Neck or back surgery: no  Past pain management: no     Past Significant Surgical History:  None     HPI:     CHIEF COMPLAINT Hip Pain  Hip pain  Pt reports increased pain since last seen in office. Pain is going down her left leg.    Subjective   Pearl Christiansen is a 74 y.o. female  who presents for follow-up.  She has a history of chronic left hip pain.  The pain improved significantly after hip injection.      History of Present Illness  Mrs. Christiansen states that her left hip pain flared horribly about a week ago.  She has pain in her left groin and it radiates into her left thigh to her knee.  Her left knee has also been painful.  She was barely able to walk on it a few days ago, but it's improved slightly now.  She's been using a topical medication with some benefit.        PEG Assessment   What number best describes your pain on average in the past week?9  What number best describes how, during the past week, pain has interfered with your enjoyment of life?9  What number best describes how, during the past week, pain has interfered with your general activity?  9    REVIEW OF PERTINENT MEDICAL DATA  8/5/22 XR HIP W OR WO PELVIS 2-3 VIEW LEFT-     Clinical: Left hip pain x4 days     FINDINGS: There is mild narrowing of the left hip joint. No fracture of  the left  "hip or hemipelvis. There is a 6 x 3 mm calcification projecting  along the lower margin of the hip joint. Likely degenerative. No  cortical bone donor site to suggest an avulsion fracture.     CONCLUSION: Left hip joint degeneration as described above. No acute  osseous articular abnormality.     This report was finalized on 8/5/2022 11:01 AM by Dr. Tyson Naranjo M.D.    The following portions of the patient's history were reviewed and updated as appropriate: allergies, current medications, past family history, past medical history, past social history, past surgical history, and problem list.    Review of Systems   Constitutional:  Negative for activity change (less), fatigue and fever.   Respiratory:  Negative for cough and chest tightness.    Cardiovascular:  Negative for chest pain.   Gastrointestinal:  Negative for abdominal pain, constipation and diarrhea.   Neurological:  Negative for dizziness, weakness, light-headedness, numbness and headaches.   Psychiatric/Behavioral:  Positive for sleep disturbance. Negative for agitation and suicidal ideas. The patient is not nervous/anxious.      I have reviewed and confirmed the accuracy of the ROS as documented by the MA/LPN/RN Rhona Hammer MD    Vitals:    02/20/25 0837   BP: 138/87   BP Location: Left arm   Patient Position: Sitting   Cuff Size: Adult   Pulse: 85   Resp: 20   Temp: 95.4 °F (35.2 °C)   TempSrc: Temporal   SpO2: 94%   Weight: 82.1 kg (181 lb)   Height: 157 cm (61.81\")   PainSc: 6          Objective   Physical Exam  Constitutional:       General: She is not in acute distress.  Pulmonary:      Effort: Pulmonary effort is normal. No respiratory distress.   Musculoskeletal:      Comments: Pain with internal and external rotation of the left hip.    Skin:     General: Skin is warm and dry.   Neurological:      Mental Status: She is alert.   Psychiatric:         Mood and Affect: Mood normal.         Thought Content: Thought content normal. "             Assessment & Plan   Diagnoses and all orders for this visit:    1. Primary osteoarthritis of left hip (Primary)  -     Case Request  -     celecoxib (CeleBREX) 100 MG capsule; Take 1 capsule by mouth 2 (Two) Times a Day As Needed for Mild Pain.  Dispense: 14 capsule; Refill: 0    2. Chronic left hip pain  -     Case Request  -     celecoxib (CeleBREX) 100 MG capsule; Take 1 capsule by mouth 2 (Two) Times a Day As Needed for Mild Pain.  Dispense: 14 capsule; Refill: 0          - Will schedule for left intra-articular hip injection.  Risks discussed including but not limited to bleeding, bruising, infection, damage to surrounding structures, headache, and rare things such as being paralyzed, seizure, stroke, heart attack and death.  The risk of steroid medications include but are not limited to immunosuppression, which can increase the risk of darren an infectious disease as well as decrease the immune response to a vaccine.    - Will prescribe a short-term prescription for celebrex to help with her knee.  Discontinue if any GI discomfort.  Drink plenty of fluids.  Discussed medication with the patient.  Included in this discussion was the potential for side effects and adverse events.  Patient verbalized understanding and wished to proceed.  Prescription will be sent to pharmacy.   - Pearl Christiansen reports a pain score of 6.  Given her pain assessment as noted, treatment options were discussed and the following options were decided upon as a follow-up plan to address the patient's pain: prescription for non-opiod analgesics and steroid injections.  - Patient screened positive for depression based on a PHQ-9 score of  on . Follow-up recommendations include:  . .  - Pearl Christiansen  reports that she quit smoking about 33 years ago. Her smoking use included cigarettes. She started smoking about 60 years ago. She has a 30 pack-year smoking history. She has been exposed to tobacco smoke. She has never used  smokeless tobacco.       --- Follow-up for left intra-articular hip injection (25g without skin local) and office visit 4-6 weeks following.           Rhona Hammer MD  Pain Management    EMR Dragon/Transcription disclaimer:   Part of this note may be an electronic transcription/translation of spoken language to printed text using the Dragon Dictation System.

## 2025-02-20 ENCOUNTER — PREP FOR SURGERY (OUTPATIENT)
Dept: SURGERY | Facility: SURGERY CENTER | Age: 75
End: 2025-02-20
Payer: MEDICARE

## 2025-02-20 ENCOUNTER — TRANSCRIBE ORDERS (OUTPATIENT)
Dept: SURGERY | Facility: SURGERY CENTER | Age: 75
End: 2025-02-20
Payer: MEDICARE

## 2025-02-20 ENCOUNTER — OFFICE VISIT (OUTPATIENT)
Dept: PAIN MEDICINE | Facility: CLINIC | Age: 75
End: 2025-02-20
Payer: MEDICARE

## 2025-02-20 VITALS
OXYGEN SATURATION: 94 % | WEIGHT: 181 LBS | RESPIRATION RATE: 20 BRPM | HEIGHT: 62 IN | HEART RATE: 85 BPM | BODY MASS INDEX: 33.31 KG/M2 | DIASTOLIC BLOOD PRESSURE: 87 MMHG | TEMPERATURE: 95.4 F | SYSTOLIC BLOOD PRESSURE: 138 MMHG

## 2025-02-20 DIAGNOSIS — G89.29 CHRONIC LEFT HIP PAIN: ICD-10-CM

## 2025-02-20 DIAGNOSIS — M16.12 PRIMARY OSTEOARTHRITIS OF LEFT HIP: Primary | ICD-10-CM

## 2025-02-20 DIAGNOSIS — F41.9 ANXIETY: Primary | ICD-10-CM

## 2025-02-20 DIAGNOSIS — M25.552 CHRONIC LEFT HIP PAIN: ICD-10-CM

## 2025-02-20 DIAGNOSIS — Z41.9 SURGERY, ELECTIVE: Primary | ICD-10-CM

## 2025-02-20 PROCEDURE — 1159F MED LIST DOCD IN RCRD: CPT | Performed by: ANESTHESIOLOGY

## 2025-02-20 PROCEDURE — 99214 OFFICE O/P EST MOD 30 MIN: CPT | Performed by: ANESTHESIOLOGY

## 2025-02-20 PROCEDURE — 1125F AMNT PAIN NOTED PAIN PRSNT: CPT | Performed by: ANESTHESIOLOGY

## 2025-02-20 PROCEDURE — 1160F RVW MEDS BY RX/DR IN RCRD: CPT | Performed by: ANESTHESIOLOGY

## 2025-02-20 RX ORDER — CELECOXIB 100 MG/1
100 CAPSULE ORAL 2 TIMES DAILY PRN
Qty: 14 CAPSULE | Refills: 0 | Status: SHIPPED | OUTPATIENT
Start: 2025-02-20

## 2025-02-20 RX ORDER — DIAZEPAM 5 MG/1
10 TABLET ORAL ONCE
OUTPATIENT
Start: 2025-02-20 | End: 2025-02-20

## 2025-02-26 DIAGNOSIS — I10 HYPERTENSION, UNSPECIFIED TYPE: ICD-10-CM

## 2025-02-26 RX ORDER — LOSARTAN POTASSIUM 25 MG/1
25 TABLET ORAL DAILY
Qty: 90 TABLET | Refills: 0 | Status: SHIPPED | OUTPATIENT
Start: 2025-02-26

## 2025-02-26 NOTE — TELEPHONE ENCOUNTER
Rx Refill Note  Requested Prescriptions     Pending Prescriptions Disp Refills    losartan (Cozaar) 25 MG tablet 90 tablet 0     Sig: Take 1 tablet by mouth Daily.      Last office visit with prescribing clinician: 11/8/2024   Last telemedicine visit with prescribing clinician: Visit date not found   Next office visit with prescribing clinician: 6/3/2025                         Would you like a call back once the refill request has been completed: [] Yes [] No    If the office needs to give you a call back, can they leave a voicemail: [] Yes [] No    William Membreno MA  02/26/25, 13:29 EST

## 2025-02-28 ENCOUNTER — APPOINTMENT (OUTPATIENT)
Dept: GENERAL RADIOLOGY | Facility: SURGERY CENTER | Age: 75
End: 2025-02-28
Payer: MEDICARE

## 2025-02-28 NOTE — DISCHARGE INSTRUCTIONS
[facility] - Post-procedure Instructions          --  While there are no absolute restrictions, it is recommended that you do not perform strenuous activity today. In the morning, you may resume your level of activity as before your block.    --  If you have a band-aid at your injection site, please remove it later today. Observe the area for any redness, swelling, pus-like drainage, or a temperature over 101°. If any of these symptoms occur, please call your doctor at [phone]. If after office hours, leave a message and the on-call provider will return your call.    --  Ice may be applied to your injection site. It is recommended you avoid direct heat (heating pad; hot tub) for 1-2 days.    --  Call [facility] at [phone] if you experience persistent headache, persistent bleeding from the injection site, or severe pain not relieved by heat or oral medication.    --  Do not make important decisions today.    --  Due to the effects of the block and/or the I.V. Sedation, DO NOT drive or operate hazardous machinery for 12 hours.  Local anesthetics may cause numbness after procedure and precautions must be taken with regards to operating equipment as well as with walking, even if ambulating with assistance of another person or with an assistive device.    --  Do not drink alcohol for 12 hours.    -- You may return to work tomorrow, or as directed by your referring doctor.    --  Occasionally you may notice a slight increase in your pain after the procedure. This should start to improve within the next 24-48 hours. Radiofrequency ablation procedure pain may last 3-4 weeks.    --  It may take as long as 3-4 days before you notice a gradual improvement in your pain and/or other symptoms.    -- You may continue to take your prescribed pain medication as needed.    --  Some normal possible side effects of steroid use could include fluid retention, increased blood sugar, dull headache,  increased sweating, increased appetite, mood swings and flushing.    --  Diabetics are recommended to watch their blood glucose level closely for 24-48 hours after the injection.    --  Must stay in PACU for 20 min upon arrival and prove no leg weakness before being discharged.    --  IN THE EVENT OF A LIFE THREATENING EMERGENCY, (CHEST PAIN, BREATHING DIFFICULTIES, PARALYSIS…) YOU SHOULD GO TO YOUR NEAREST EMERGENCY ROOM.    --  You should be contacted by our office within 2-3 days to schedule follow up or next appointment date.  If not contacted within 7 days, please call the office at [phone]

## 2025-03-03 ENCOUNTER — APPOINTMENT (OUTPATIENT)
Dept: GENERAL RADIOLOGY | Facility: SURGERY CENTER | Age: 75
End: 2025-03-03
Payer: MEDICARE

## 2025-03-03 ENCOUNTER — TELEPHONE (OUTPATIENT)
Dept: PAIN MEDICINE | Facility: CLINIC | Age: 75
End: 2025-03-03

## 2025-03-03 NOTE — TELEPHONE ENCOUNTER
"Provider: BUFFY    Caller: Pearl Christiansen \"Ramiro\"    Relationship to Patient: Self    Phone Number: [phone]*    Reason for Call: PT HAD INJ SCHEDULED FOR TODAY THAT NEEDS TO BE R/S DUE TO NOT FEELING WELL. PT WOULD PREFER A TUESDAY MORNING IF POSSIBLE. PLEASE ADVISE.    "

## 2025-03-06 ENCOUNTER — TELEPHONE (OUTPATIENT)
Dept: FAMILY MEDICINE CLINIC | Facility: CLINIC | Age: 75
End: 2025-03-06

## 2025-03-06 NOTE — TELEPHONE ENCOUNTER
"  Caller: Pearl Christiansen \"Ramiro\"    Relationship: Self    Best call back number: [phone]     A FEW WEEKS AGO DR VALENCIA CHANGED HER MEDICATION FROM JANUMET AND JARDIANCE AND THEN IT WAS CHANGED TO JARIDANCE, METFORMIN, MONJAURO. PATIENT STATES SHE GOT A YEAST INFECTION AND WAS SORE AROUND HER ANUS AND SEEN HER OBGYN AND WAS TOLD IT MAY BE SIDE EFFECT OF THE JARDIANCE. PATIENS STATES BECAUSE OF THIS SHE IS NOW ONLY TAKING THE JANUMENT. PATIENT WOULD LIKE TO SPEAK TO DR VALENCIA ABOUT HOW TO PROCEED WITH THE MEDICATIONS   "

## 2025-03-07 ENCOUNTER — PATIENT OUTREACH (OUTPATIENT)
Dept: CASE MANAGEMENT | Facility: OTHER | Age: 75
End: 2025-03-07
Payer: MEDICARE

## 2025-03-07 NOTE — TELEPHONE ENCOUNTER
Called and spoke to pt to confirm med intake. Pt currently only taking Janumet but inquiring if it ok to take mounjaro and metformin as well.

## 2025-03-07 NOTE — TELEPHONE ENCOUNTER
"Hub staff attempted to follow warm transfer process and was unsuccessful     Caller: Pearl Christiansen \"Ramiro\"    Relationship to patient: Self    Best call back number: [phone]     Patient is needing: PATIENT RETURNING MISSED CALL          "

## 2025-03-07 NOTE — OUTREACH NOTE
"AMBULATORY CASE MANAGEMENT NOTE    Names and Relationships of Patient/Support Persons: Contact: Pearl Christiansen \"Ramiro\"; Relationship: Self -     Patient Outreach  RN-ACM outreach with patient. Patient states to not be feeling well. Patient states to have headache; congestion and taking Tylenol as needed. Patient states no difficulty with fever; chest pain; SOB. RN-ACM encouraged patient to contact PCP for follow up and recommendations. Patient verbalized understanding. Reviewed with patient education and verbalized understanding. No further questions voiced at this time.     Education Documentation  When to Seek Medical Attention, taught by Nallely Park, RN at 3/7/2025  4:41 PM.  Learner: Patient  Readiness: Acceptance  Method: Explanation  Response: Verbalizes Understanding    Participation with Disease Management Plan, taught by Nallely Park, RN at 3/7/2025  4:41 PM.  Learner: Patient  Readiness: Acceptance  Method: Explanation  Response: Verbalizes Understanding          Nallely VEGA  Ambulatory Case Management    3/7/2025, 16:42 EST  "

## 2025-03-10 ENCOUNTER — APPOINTMENT (OUTPATIENT)
Dept: GENERAL RADIOLOGY | Facility: SURGERY CENTER | Age: 75
End: 2025-03-10
Payer: MEDICARE

## 2025-03-11 NOTE — TELEPHONE ENCOUNTER
Called and spoke to pt to inform only take mounjaro weekly and metformin as prescribed. Pt voiced understanding

## 2025-03-17 ENCOUNTER — PATIENT OUTREACH (OUTPATIENT)
Dept: CASE MANAGEMENT | Facility: OTHER | Age: 75
End: 2025-03-17
Payer: MEDICARE

## 2025-03-17 ENCOUNTER — OFFICE VISIT (OUTPATIENT)
Dept: GASTROENTEROLOGY | Facility: CLINIC | Age: 75
End: 2025-03-17
Payer: MEDICARE

## 2025-03-17 ENCOUNTER — LAB (OUTPATIENT)
Dept: LAB | Facility: HOSPITAL | Age: 75
End: 2025-03-17
Payer: MEDICARE

## 2025-03-17 ENCOUNTER — PREP FOR SURGERY (OUTPATIENT)
Dept: SURGERY | Facility: SURGERY CENTER | Age: 75
End: 2025-03-17
Payer: MEDICARE

## 2025-03-17 VITALS
HEART RATE: 85 BPM | HEIGHT: 62 IN | SYSTOLIC BLOOD PRESSURE: 140 MMHG | TEMPERATURE: 98.2 F | DIASTOLIC BLOOD PRESSURE: 74 MMHG | BODY MASS INDEX: 33.49 KG/M2 | OXYGEN SATURATION: 96 % | WEIGHT: 182 LBS

## 2025-03-17 DIAGNOSIS — Z13.6 ENCOUNTER FOR SCREENING FOR CARDIOVASCULAR DISORDERS: ICD-10-CM

## 2025-03-17 DIAGNOSIS — D50.9 IRON DEFICIENCY ANEMIA, UNSPECIFIED IRON DEFICIENCY ANEMIA TYPE: Primary | ICD-10-CM

## 2025-03-17 DIAGNOSIS — R19.7 DIARRHEA, UNSPECIFIED TYPE: ICD-10-CM

## 2025-03-17 DIAGNOSIS — R10.11 RIGHT UPPER QUADRANT ABDOMINAL PAIN: ICD-10-CM

## 2025-03-17 DIAGNOSIS — K76.0 FATTY LIVER: ICD-10-CM

## 2025-03-17 PROCEDURE — 82172 ASSAY OF APOLIPOPROTEIN: CPT | Performed by: NURSE PRACTITIONER

## 2025-03-17 PROCEDURE — 82947 ASSAY GLUCOSE BLOOD QUANT: CPT | Performed by: NURSE PRACTITIONER

## 2025-03-17 PROCEDURE — 84450 TRANSFERASE (AST) (SGOT): CPT | Performed by: NURSE PRACTITIONER

## 2025-03-17 PROCEDURE — 1159F MED LIST DOCD IN RCRD: CPT | Performed by: NURSE PRACTITIONER

## 2025-03-17 PROCEDURE — 99214 OFFICE O/P EST MOD 30 MIN: CPT | Performed by: NURSE PRACTITIONER

## 2025-03-17 PROCEDURE — 82977 ASSAY OF GGT: CPT | Performed by: NURSE PRACTITIONER

## 2025-03-17 PROCEDURE — 36415 COLL VENOUS BLD VENIPUNCTURE: CPT | Performed by: NURSE PRACTITIONER

## 2025-03-17 PROCEDURE — 82247 BILIRUBIN TOTAL: CPT | Performed by: NURSE PRACTITIONER

## 2025-03-17 PROCEDURE — 83010 ASSAY OF HAPTOGLOBIN QUANT: CPT | Performed by: NURSE PRACTITIONER

## 2025-03-17 PROCEDURE — 83883 ASSAY NEPHELOMETRY NOT SPEC: CPT | Performed by: NURSE PRACTITIONER

## 2025-03-17 PROCEDURE — 84460 ALANINE AMINO (ALT) (SGPT): CPT | Performed by: NURSE PRACTITIONER

## 2025-03-17 PROCEDURE — 84478 ASSAY OF TRIGLYCERIDES: CPT | Performed by: NURSE PRACTITIONER

## 2025-03-17 PROCEDURE — 1160F RVW MEDS BY RX/DR IN RCRD: CPT | Performed by: NURSE PRACTITIONER

## 2025-03-17 PROCEDURE — 82465 ASSAY BLD/SERUM CHOLESTEROL: CPT | Performed by: NURSE PRACTITIONER

## 2025-03-17 RX ORDER — NYSTATIN AND TRIAMCINOLONE ACETONIDE 100000; 1 [USP'U]/G; MG/G
OINTMENT TOPICAL
COMMUNITY
Start: 2025-01-28

## 2025-03-17 RX ORDER — SODIUM CHLORIDE 0.9 % (FLUSH) 0.9 %
10 SYRINGE (ML) INJECTION AS NEEDED
OUTPATIENT
Start: 2025-03-17

## 2025-03-17 RX ORDER — SODIUM CHLORIDE 0.9 % (FLUSH) 0.9 %
3 SYRINGE (ML) INJECTION EVERY 12 HOURS SCHEDULED
OUTPATIENT
Start: 2025-03-17

## 2025-03-17 RX ORDER — SODIUM CHLORIDE, SODIUM LACTATE, POTASSIUM CHLORIDE, CALCIUM CHLORIDE 600; 310; 30; 20 MG/100ML; MG/100ML; MG/100ML; MG/100ML
30 INJECTION, SOLUTION INTRAVENOUS CONTINUOUS PRN
OUTPATIENT
Start: 2025-03-17 | End: 2025-03-17

## 2025-03-17 NOTE — OUTREACH NOTE
"AMBULATORY CASE MANAGEMENT NOTE    Names and Relationships of Patient/Support Persons: Contact: ChristiansenTha bryanakash Charles \"Ramiro\"; Relationship: Self -     Patient Outreach  RN-ACM outreach with patient. Patient states to have had episodes of right upper quadrant pain; states to have had 3/17/25 GI appointment and received recommendations. Patient states  will be scheduled for ultrasound and colonoscopy. Patient states to have had improvement regarding headache; congestion but having difficulty with seasonal allergies. Patient states no difficulty with fever; chest pain; SOB; appetite or sleeping. Patient states to be compliant with medications; taking Mounjaro  and monitoring blood sugars and blood pressure. Patient states blood sugars have been a little elevated and blood pressure WNL's. Patient discusses recent passing away of brother. Reviewed with patient education and verbalized understanding. Patient states to appreciate outreach. No further questions voiced at this time.     Education Documentation  Unresolved/Worsening Symptoms, taught by Nallely Park RN at 3/17/2025  3:31 PM.  Learner: Patient  Readiness: Acceptance  Method: Explanation  Response: Verbalizes Understanding    Signs/Symptoms, taught by Nallely Park RN at 3/17/2025  3:31 PM.  Learner: Patient  Readiness: Acceptance  Method: Explanation  Response: Verbalizes Understanding    When to Seek Medical Attention, taught by Nallely Park RN at 3/17/2025  3:31 PM.  Learner: Patient  Readiness: Acceptance  Method: Explanation  Response: Verbalizes Understanding    Participation with Disease Management Plan, taught by Nallely Park RN at 3/17/2025  3:31 PM.  Learner: Patient  Readiness: Acceptance  Method: Explanation  Response: Verbalizes Understanding          Nallely VEGA  Ambulatory Case Management    3/17/2025, 15:32 EDT  "

## 2025-03-17 NOTE — PROGRESS NOTES
"Chief Complaint   Patient presents with    Anemia         History of Present Illness  Patient is a 74-year-old female who presents today for follow-up. She has a history of GERD, gastroparesis, fatty liver, IBS-D and has had a cholecystectomy.     She was referred back for right upper quadrant pain, recurrent anemia.    Patient presents today for follow-up.  She has been experiencing ongoing issues with anemia, required repeated iron infusions.  She denies any visible blood in the stool or melena.    She has also continued to experience intermittent right upper quadrant pain.  This comes and goes but is present on a daily basis.  She has not noted any specific pattern or triggers for the symptoms.    She has had some continued issues with reflux, recently had an exacerbation.  She had vomiting during episode.  She will often have coughing following the episodes.    She continues to have issues with chronic diarrhea.  She generally has around 5 bowel movements per day.    Her brother recently passed away from cirrhosis and liver cancer.    She recently started Mounjaro for diabetes.     Result Review :       MRI Abdomen With & Without Contrast (12/17/2024 17:35)    CT Abdomen Pelvis With Contrast (04/15/2024 14:17)    US Abdomen Limited (04/02/2024 13:40)    Office Visit with Suha Vogt APRN (03/25/2024)    CT Abdomen Pelvis With Contrast (04/15/2024 14:17)    SCANNED - COLONOSCOPY (11/05/2021)    Celiac Disease Panel (11/16/2021 13:25)     Vital Signs:   /74   Pulse 85   Temp 98.2 °F (36.8 °C)   Ht 157 cm (61.81\")   Wt 82.6 kg (182 lb)   SpO2 96%   BMI 33.49 kg/m²     Body mass index is 33.49 kg/m².     Physical Exam  Vitals reviewed.   Constitutional:       General: She is not in acute distress.     Appearance: She is well-developed.   HENT:      Head: Normocephalic and atraumatic.   Pulmonary:      Effort: Pulmonary effort is normal. No respiratory distress.   Abdominal:      General: Abdomen is " flat. Bowel sounds are normal. There is no distension.      Palpations: Abdomen is soft.      Tenderness: There is abdominal tenderness in the right upper quadrant.   Skin:     General: Skin is dry.      Coloration: Skin is not pale.   Neurological:      Mental Status: She is alert and oriented to person, place, and time.   Psychiatric:         Thought Content: Thought content normal.           Assessment and Plan    Diagnoses and all orders for this visit:    1. Iron deficiency anemia, unspecified iron deficiency anemia type (Primary)    2. Right upper quadrant abdominal pain    3. Fatty liver  -     ALBRIGHT Fibrosure  -     US Derived Fat Fraction; Future  -     US Elastography Parenchyma; Future    4. Encounter for screening for cardiovascular disorders  -     ALBRIGHT Fibrosure    5. Diarrhea, unspecified type         Discussion  Patient presents today for evaluation with concerns about persistent iron deficiency anemia and right upper quadrant pain.  Due to persistent anemia, recommended EGD and colonoscopy for further evaluation to assess for any potential source of GI blood loss or to evaluate for celiac disease which could cause iron malabsorption.  If these are negative, we may consider capsule endoscopy for further evaluation.    Regarding right upper quadrant pain, discussed this potentially could be from fatty liver.  We reviewed dietary lifestyle modifications to help with this.  Will check FibroSure and arrange for FibroScan to evaluate for any fibrosis.  If there were fibrosis identified, could consider addition of Rezdiffra.  Pain also could potentially be secondary to adhesions from prior cholecystectomy.          Follow Up   Return for Follow up to review results after testing complete.    Patient Instructions   Schedule EGD and colonoscopy for further evaluation.     For fatty liver, check Fibrosure and schedule ultrasound with elastography for further evaluation.    For fatty liver, weight loss is  recommended. Recommend following a low fat and low sugar diet. Recommend management of diabetes and elevated cholesterol with primary care provider if indicated. Regular exercise is recommended. Alcohol avoidance is recommended.

## 2025-03-17 NOTE — PATIENT INSTRUCTIONS
Schedule EGD and colonoscopy for further evaluation.     For fatty liver, check Fibrosure and schedule ultrasound with elastography for further evaluation.    For fatty liver, weight loss is recommended. Recommend following a low fat and low sugar diet. Recommend management of diabetes and elevated cholesterol with primary care provider if indicated. Regular exercise is recommended. Alcohol avoidance is recommended.

## 2025-03-19 LAB
A2 MACROGLOB SERPL-MCNC: 321 MG/DL (ref 110–276)
ALT SERPL W P-5'-P-CCNC: 30 IU/L (ref 0–40)
APO A-I SERPL-MCNC: 145 MG/DL (ref 116–209)
AST SERPL W P-5'-P-CCNC: 44 IU/L (ref 0–40)
BILIRUB SERPL-MCNC: 0.2 MG/DL (ref 0–1.2)
CHOLEST SERPL-MCNC: 151 MG/DL (ref 100–199)
FIBROSIS SCORING:: ABNORMAL
FIBROSIS STAGE SERPL QL: ABNORMAL
GGT SERPL-CCNC: 47 IU/L (ref 0–60)
GLUCOSE SERPL-MCNC: 220 MG/DL (ref 70–99)
HAPTOGLOB SERPL-MCNC: 179 MG/DL (ref 42–346)
LABORATORY COMMENT REPORT: ABNORMAL
LIVER FIBR SCORE SERPL CALC.FIBROSURE: 0.34 (ref 0–0.21)
LIVER STEATOSIS GRADE SERPL QL: ABNORMAL
LIVER STEATOSIS SCORE SERPL: 0.88 (ref 0–0.4)
NASH GRADE SERPL QL: ABNORMAL
NASH INTERPRETATION SERPL-IMP: ABNORMAL
NASH SCORE SERPL: 0.81 (ref 0–0.25)
NASH SCORING: ABNORMAL
STEATOSIS SCORING: ABNORMAL
TEST PERFORMANCE INFO SPEC: ABNORMAL
TEST PERFORMANCE INFO SPEC: ABNORMAL
TRIGL SERPL-MCNC: 381 MG/DL (ref 0–149)

## 2025-03-20 NOTE — H&P
[facility]   HISTORY AND PHYSICAL    Patient Name: Pearl Christiansen  : 1950  MRN: [id number]  Primary Care Physician:  Jesi Estes MD  Date of admission: 3/24/2025    Subjective   Subjective     Chief Complaint: Left hip pain    History of Present Illness  Mrs. Christiansen presents today with continued left hip pain that radiates into her thigh.       Review of Systems   Constitutional:  Negative for chills and fever.   Respiratory:  Negative for cough and shortness of breath.    Musculoskeletal:  Positive for arthralgias.        Personal History     Past Medical History:   Diagnosis Date    Allergic     Anemia     Anxiety     Arthritis     Arthritis of back     Arthritis of neck     CAD (coronary artery disease)     50% mid LAD by cath on 2017    Chronic pain disorder     Colon polyp  ?    Depression     Diabetes mellitus     Diverticulosis     Fatty liver     GERD (gastroesophageal reflux disease)     H/O Anal fissure     Headache     Headache, tension-type     Hip arthrosis     Hyperlipidemia     Hypertension     Hypothyroidism     Irritable bowel syndrome     Knee swelling     Low back pain     Low back strain     Neuropathy in diabetes     Obesity     ALEXANDER (obstructive sleep apnea)     PAD (peripheral artery disease)     Stress fracture        Past Surgical History:   Procedure Laterality Date    ADENOIDECTOMY      CARDIAC CATHETERIZATION Left 2017    [facility]    CHOLECYSTECTOMY      COLONOSCOPY  2015    Diverticulosis, hemorrhoids    COLONOSCOPY W/ POLYPECTOMY  2020    ENDOSCOPY  2016    FOOT SURGERY      FRACTURE SURGERY      Stress fracture about 4 yrs ago    GALLBLADDER SURGERY      HAND SURGERY      RECTOVAGINAL FISTULA REPAIR      SACROILIAC JOINT INJECTION Left 2024    Procedure: SACROILIAC JOINT INJECTION LEFT [id number];  Surgeon: Rhona Hammer MD;  Location: [facility] [place] MAIN OR;  Service: Pain Management;  Laterality: Left;    STEROID INJECTION HIP Left 2022     Procedure: HIP INJECTION UNDER FLUORO - left;  Surgeon: Rhona Hammer MD;  [place] [facility] [place];  Service: Pain Management;  Laterality: Left;    TONSILLECTOMY      TRIGGER FINGER RELEASE      TRIGGER POINT INJECTION      UPPER GASTROINTESTINAL ENDOSCOPY      VULVA BIOPSY         Family History: family history includes Alcohol abuse in her brother; COPD in her mother; Heart disease in her mother; Hypertension in her mother. Otherwise pertinent FHx was reviewed and not pertinent to current issue.    Social History:  reports that she quit smoking about 33 years ago. Her smoking use included cigarettes. She started smoking about 60 years ago. She has a 30 pack-year smoking history. She has been exposed to tobacco smoke. She has never used smokeless tobacco. She reports that she does not currently use alcohol. She reports that she does not use drugs.    Home Medications:  Acetaminophen, Diclofenac Sodium, Tirzepatide, amLODIPine, aspirin, atorvastatin, carvedilol, celecoxib, cetirizine, clobetasol propionate, colestipol, dicyclomine, escitalopram, esomeprazole, estradiol, famotidine, gabapentin, icosapent ethyl, levothyroxine, losartan, metFORMIN ER, methocarbamol, multivitamin with minerals, nystatin, nystatin-triamcinolone, and tiZANidine    Allergies:  Allergies   Allergen Reactions    Lidocaine Other (See Comments)     Does not work on pt    Keflex [Cephalexin] Rash    Penicillins Rash       Objective    Objective     Vitals:        Physical Exam  Constitutional:       General: She is not in acute distress.  Pulmonary:      Effort: Pulmonary effort is normal. No respiratory distress.   Skin:     General: Skin is warm and dry.   Neurological:      Mental Status: She is alert.   Psychiatric:         Mood and Affect: Mood normal.         Thought Content: Thought content normal.         Result Review    Result Review:  I have personally reviewed the results from the time of this admission to 3/24/2025 11:55 EDT  and agree with these findings:  []  Laboratory list / accordion  []  Microbiology  []  Radiology  []  EKG/Telemetry   []  Cardiology/Vascular   []  Pathology  []  Old records  []  Other:  Most notable findings include: No new       Assessment & Plan   Assessment / Plan     Brief Patient Summary:  Pearl Christiansen is a 74 y.o. female who has chronic left hip pain.     Active Hospital Problems:  Active Hospital Problems    Diagnosis     Chronic left hip pain     Primary osteoarthritis of left hip      Plan: Left intra-articular hip injection       VTE Prophylaxis:  No VTE prophylaxis order currently exists.    Rhona Hammer MD

## 2025-03-22 DIAGNOSIS — Z87.19 HISTORY OF IBS: ICD-10-CM

## 2025-03-24 ENCOUNTER — HOSPITAL ENCOUNTER (OUTPATIENT)
Dept: GENERAL RADIOLOGY | Facility: SURGERY CENTER | Age: 75
Setting detail: HOSPITAL OUTPATIENT SURGERY
End: 2025-03-24
Payer: MEDICARE

## 2025-03-24 ENCOUNTER — HOSPITAL ENCOUNTER (OUTPATIENT)
Facility: SURGERY CENTER | Age: 75
Setting detail: HOSPITAL OUTPATIENT SURGERY
Discharge: HOME | End: 2025-03-24
Attending: ANESTHESIOLOGY | Admitting: ANESTHESIOLOGY
Payer: MEDICARE

## 2025-03-24 VITALS
HEART RATE: 77 BPM | HEIGHT: 62 IN | TEMPERATURE: 97.8 F | OXYGEN SATURATION: 94 % | WEIGHT: 185 LBS | DIASTOLIC BLOOD PRESSURE: 76 MMHG | RESPIRATION RATE: 16 BRPM | BODY MASS INDEX: 34.04 KG/M2 | SYSTOLIC BLOOD PRESSURE: 144 MMHG

## 2025-03-24 DIAGNOSIS — Z41.9 SURGERY, ELECTIVE: ICD-10-CM

## 2025-03-24 DIAGNOSIS — F41.9 ANXIETY: ICD-10-CM

## 2025-03-24 LAB — GLUCOSE BLDC GLUCOMTR-MCNC: 117 MG/DL (ref 70–130)

## 2025-03-24 PROCEDURE — 25010000002 DEXAMETHASONE SODIUM PHOSPHATE 100 MG/10ML SOLUTION 10 ML VIAL: Performed by: ANESTHESIOLOGY

## 2025-03-24 PROCEDURE — 77002 NEEDLE LOCALIZATION BY XRAY: CPT

## 2025-03-24 PROCEDURE — 77002 NEEDLE LOCALIZATION BY XRAY: CPT | Performed by: ANESTHESIOLOGY

## 2025-03-24 PROCEDURE — 20610 DRAIN/INJ JOINT/BURSA W/O US: CPT | Performed by: ANESTHESIOLOGY

## 2025-03-24 PROCEDURE — 25510000001 IOPAMIDOL 61 % SOLUTION 30 ML VIAL: Performed by: ANESTHESIOLOGY

## 2025-03-24 PROCEDURE — 25010000002 LIDOCAINE PF 1% 1 % SOLUTION 30 ML VIAL: Performed by: ANESTHESIOLOGY

## 2025-03-24 RX ORDER — DIAZEPAM 5 MG/1
10 TABLET ORAL ONCE
Status: COMPLETED | OUTPATIENT
Start: 2025-03-24 | End: 2025-03-24

## 2025-03-24 RX ORDER — DICYCLOMINE HYDROCHLORIDE 10 MG/1
10 CAPSULE ORAL 3 TIMES DAILY PRN
Qty: 90 CAPSULE | Refills: 0 | Status: SHIPPED | OUTPATIENT
Start: 2025-03-24

## 2025-03-24 RX ORDER — COLESTIPOL HYDROCHLORIDE 1 G/1
1 TABLET ORAL 2 TIMES DAILY
Qty: 180 TABLET | Refills: 1 | Status: SHIPPED | OUTPATIENT
Start: 2025-03-24

## 2025-03-24 RX ORDER — CARVEDILOL 3.12 MG/1
3.12 TABLET ORAL 2 TIMES DAILY
Qty: 60 TABLET | Refills: 0 | Status: SHIPPED | OUTPATIENT
Start: 2025-03-24

## 2025-03-24 RX ADMIN — DIAZEPAM 10 MG: 5 TABLET ORAL at 12:09

## 2025-03-24 NOTE — OP NOTE
LEFTHip injection  [facility]    PREOPERATIVE DIAGNOSIS:     Left chronic hip pain,  Left hip osteoarthritis    POSTOPERATIVE DIAGNOSIS:   Same as preop diagnosis    PROCEDURE:  20610-LT - Intra-articular Hip Joint Injection, with fluoroscopic guidance & hip arthrogram    PRE-PROCEDURE DISCUSSION WITH PATIENT:    Risks and complications were discussed with the patient prior to starting the procedure (including but not limited to infection, bleeding, injury, paralysis, and death) and informed consent was obtained.      SURGEON:  Rhona Hammer MD    SEDATION:   Anxiolysis was used for this procedure which included PO Valium 10mg  ANESTHETIC AGENT:   1% Lidocaine  STEROID AGENT:    10mg dexamethasone    DESCRIPTON OF PROCEDURE:  After obtaining informed consent, IV access was obtained in the preoperative area.  The patient was transported to the operative suite and placed in a prone position.  Heart rate, blood pressure, and pulse oximetry were monitored.  Any and all sedation given was administered by the RN under my direct supervision and guidance.   The hip area was prepped in a wide diameter with Chloraprep and draped in a sterile fashion.     AP  film was used to visualize the neck of the femur on the LEFT side.  The skin and subcutaneous tissue over the area was anesthetized with 1% lidocaine. A 22-gauge spinal needle was then advanced percutaneously through the anesthetized skin tract under fluoroscopic guidance in a coaxial view to contact periosteum at the target site.  After negative aspiration a volume of  1 mL Isovue 61% was injected producing good spread through the joint space with no evidence of vascular run-off. Subsequently, a volume of 5 mL consisting of the above mixture of steroid and anesthetic agent was injected without resistance.   Vital signs remained stable.  The onset of analgesia was noted.  Needle removed intact.      ESTIMATED BLOOD LOSS:  minimal  SPECIMENS:   None    COMPLICATIONS:  No complications were noted. and There was no indication of vascular uptake on live injection of contrast dye.    TOLERANCE & DISCHARGE CONDITION:    The patient tolerated the procedure well.  The patient was transported to the recovery area without difficulties.  The patient was discharged to home under the care of a chaperone and in satisfactory condition.    PLAN OF CARE:  The patient was given our standard instruction sheet.  The patient will Return to clinic 4-6 wks.  The patient is asked to keep an account of pain for the next several hours today.  The patient will resume all medications as per the medication reconciliation sheet.

## 2025-03-24 NOTE — TELEPHONE ENCOUNTER
Rx Refill Note  Requested Prescriptions     Pending Prescriptions Disp Refills    colestipol (COLESTID) 1 g tablet       Sig: Take 1 tablet by mouth 2 (Two) Times a Day.    dicyclomine (BENTYL) 10 MG capsule 90 capsule 0      Last office visit with prescribing clinician: 11/8/2024   Last telemedicine visit with prescribing clinician: Visit date not found   Next office visit with prescribing clinician: 6/3/2025                         Would you like a call back once the refill request has been completed: [] Yes [] No    If the office needs to give you a call back, can they leave a voicemail: [] Yes [] No    Josh Hernandez MA  03/24/25, 11:19 EDT

## 2025-03-31 ENCOUNTER — APPOINTMENT (OUTPATIENT)
Dept: ULTRASOUND IMAGING | Facility: HOSPITAL | Age: 75
End: 2025-03-31
Payer: MEDICARE

## 2025-03-31 ENCOUNTER — HOSPITAL ENCOUNTER (OUTPATIENT)
Dept: ULTRASOUND IMAGING | Facility: HOSPITAL | Age: 75
Discharge: HOME | End: 2025-03-31
Admitting: NURSE PRACTITIONER
Payer: MEDICARE

## 2025-03-31 DIAGNOSIS — K76.0 FATTY LIVER: ICD-10-CM

## 2025-03-31 PROCEDURE — 0690T QUAN US TIS CHARAC W/DX US: CPT

## 2025-03-31 PROCEDURE — 76981 USE PARENCHYMA: CPT

## 2025-04-01 ENCOUNTER — LAB (OUTPATIENT)
Dept: OTHER | Facility: HOSPITAL | Age: 75
End: 2025-04-01
Payer: MEDICARE

## 2025-04-01 DIAGNOSIS — D50.8 IRON DEFICIENCY ANEMIA SECONDARY TO INADEQUATE DIETARY IRON INTAKE: ICD-10-CM

## 2025-04-01 DIAGNOSIS — K21.9 GASTROESOPHAGEAL REFLUX DISEASE WITHOUT ESOPHAGITIS: ICD-10-CM

## 2025-04-01 LAB
BASOPHILS # BLD AUTO: 0.08 10*3/MM3 (ref 0–0.2)
BASOPHILS NFR BLD AUTO: 1 % (ref 0–1.5)
DEPRECATED RDW RBC AUTO: 45.7 FL (ref 37–54)
EOSINOPHIL # BLD AUTO: 0.45 10*3/MM3 (ref 0–0.4)
EOSINOPHIL NFR BLD AUTO: 5.8 % (ref 0.3–6.2)
ERYTHROCYTE [DISTWIDTH] IN BLOOD BY AUTOMATED COUNT: 14 % (ref 12.3–15.4)
FERRITIN SERPL-MCNC: 423.9 NG/ML (ref 13–150)
HCT VFR BLD AUTO: 39.7 % (ref 34–46.6)
HGB BLD-MCNC: 12.9 G/DL (ref 12–15.9)
IMM GRANULOCYTES # BLD AUTO: 0.03 10*3/MM3 (ref 0–0.05)
IMM GRANULOCYTES NFR BLD AUTO: 0.4 % (ref 0–0.5)
IRON 24H UR-MRATE: 68 MCG/DL (ref 37–145)
IRON SATN MFR SERPL: 24 % (ref 20–50)
LYMPHOCYTES # BLD AUTO: 2 10*3/MM3 (ref 0.7–3.1)
LYMPHOCYTES NFR BLD AUTO: 25.9 % (ref 19.6–45.3)
MCH RBC QN AUTO: 29.1 PG (ref 26.6–33)
MCHC RBC AUTO-ENTMCNC: 32.5 G/DL (ref 31.5–35.7)
MCV RBC AUTO: 89.6 FL (ref 79–97)
MONOCYTES # BLD AUTO: 0.66 10*3/MM3 (ref 0.1–0.9)
MONOCYTES NFR BLD AUTO: 8.5 % (ref 5–12)
NEUTROPHILS NFR BLD AUTO: 4.5 10*3/MM3 (ref 1.7–7)
NEUTROPHILS NFR BLD AUTO: 58.4 % (ref 42.7–76)
NRBC BLD AUTO-RTO: 0 /100 WBC (ref 0–0.2)
PLATELET # BLD AUTO: 223 10*3/MM3 (ref 140–450)
PMV BLD AUTO: 11 FL (ref 6–12)
RBC # BLD AUTO: 4.43 10*6/MM3 (ref 3.77–5.28)
TIBC SERPL-MCNC: 285 MCG/DL (ref 298–536)
TRANSFERRIN SERPL-MCNC: 191 MG/DL (ref 200–360)
WBC NRBC COR # BLD AUTO: 7.72 10*3/MM3 (ref 3.4–10.8)

## 2025-04-01 PROCEDURE — 85025 COMPLETE CBC W/AUTO DIFF WBC: CPT | Performed by: INTERNAL MEDICINE

## 2025-04-01 PROCEDURE — 36415 COLL VENOUS BLD VENIPUNCTURE: CPT

## 2025-04-01 PROCEDURE — 84466 ASSAY OF TRANSFERRIN: CPT | Performed by: INTERNAL MEDICINE

## 2025-04-01 PROCEDURE — 83540 ASSAY OF IRON: CPT | Performed by: INTERNAL MEDICINE

## 2025-04-01 PROCEDURE — 84238 ASSAY NONENDOCRINE RECEPTOR: CPT | Performed by: INTERNAL MEDICINE

## 2025-04-01 PROCEDURE — 82728 ASSAY OF FERRITIN: CPT | Performed by: INTERNAL MEDICINE

## 2025-04-04 LAB — STFR SERPL-SCNC: 17.1 NMOL/L (ref 12.2–27.3)

## 2025-04-07 ENCOUNTER — APPOINTMENT (OUTPATIENT)
Dept: WOMENS IMAGING | Facility: HOSPITAL | Age: 75
End: 2025-04-07
Payer: MEDICARE

## 2025-04-07 PROCEDURE — 77065 DX MAMMO INCL CAD UNI: CPT | Performed by: RADIOLOGY

## 2025-04-07 PROCEDURE — 76642 ULTRASOUND BREAST LIMITED: CPT | Performed by: RADIOLOGY

## 2025-04-07 PROCEDURE — G0279 TOMOSYNTHESIS, MAMMO: HCPCS | Performed by: RADIOLOGY

## 2025-04-07 RX ORDER — ATORVASTATIN CALCIUM 40 MG/1
40 TABLET, FILM COATED ORAL NIGHTLY
Qty: 90 TABLET | Refills: 0 | Status: SHIPPED | OUTPATIENT
Start: 2025-04-07

## 2025-04-07 NOTE — TELEPHONE ENCOUNTER
Received pharmacy request for Atorvastatin 40 mg. Spoke to patient, confirmed dosage and pharmacy.

## 2025-04-07 NOTE — PROGRESS NOTES
REASON FOR FOLLOW-UP: Multifactoral anemia including anemia of chronic disease and iron deficiency.    History of Present Illness   The patient is 73-year-old female followed with below medical history and seen by subspecialists including GI 3/23/2023 for GE reflux, gastroparesis and IBS as well as previous cholecystectomy with studies at that point including H&H 11.1 and 34.4 with MCV of 80.0 and MCH 25.8.  Her studies 4/3 include a ferritin at 18.4 and iron of 51.  At this point she was followed by cardiology seen 4/27/2023 with a history of nonobstructive coronary artery disease, sleep apnea on CPAP and  peripheral vascular disease with left lower extremity claudication.  She had been admitted to [facility] for epigastric discomfort undergoing nuclear stress test that was significant for possible infarct but no ischemia.  She is thought to have symptoms from GI origin and was relatively stable with plans to repeat echocardiogram.  This was obtained 5/10/2023 with LV SF of 57.8%, normal left ventricular cavity size and wall thickness, left ventricular diastolic function with grade 1 impaired relaxation.    She had further assessment 5/1/2023 with H&H 10.7 and 33.9, MCHC 25.4.    Patient was seen by primary care 10/6/2023 with leg cramps that were worsening treated symptomatically with electrolyte supplementation, PAD had improvement graded exercise with the patient unable to tolerate Pletal.  She was reviewed by orthopedics 10/12/2023 for her worsening foot pain and thought possibly to have an occult fracture or stress fracture.  Plain films demonstrated degenerative changes with calcaneal spurs but no acute fracture.    Repeat studies 10/20/2023 with H&H of 10.8 and 33.9, MCH of 25.5, MCV 80.1, platelet count 247,000, free T41.01, TSH 1.680.  Her further cardiac exams were unremarkable 11/2/2023 of the patient continued to have fatigue and had respiratory issues treated by primary care 11/8/2023  with Mucinex and Flonase, subsequent reevaluation 11/13 by immediate care treated with prednisone and Zithromax.    We are asked to see the patient for her anemia and she is seen in office 11/28/2023.  We discussed her findings today in particular her variable anemia which is improved in office 11/28/2023.      After discussion she will be sent back to laboratory obtaining CMP, EPO level, MATHEUS, PE, free serum light chains, MMA, sed rate, B12 level, MATHEUS, PE, free serum light chains.  She had been in her primary care's office today undergoing TSH, free T4, hemoglobin A1c, ferritin, iron profile as well.    Her studies included a CMP with blood glucose of 163, otherwise normal, total cholesterol 149, triglycerides 341, HDL 39, ferritin of 31.9, iron profile at 50% saturation, hemoglobin A1c of 8.0, TSH 0.677, free T41.15, B12 of 551, MMA of 114, IRF of 19.8, reticulocyte percentage 1.48, reticulocyte hemoglobin 31.2, paraprotein analysis with no monoclonal spike, free light chain kappa light 22.7, free lambda light chain 19.1 and kappa/lambda ratio of 1.19, EPO level 24.3, ESR 10, negative NEW comprehensive profile.  Please note GI workup including previous colonoscopy.    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  She is reassessed in July now with evidence of iron deficiency and a trial of oral iron was not tolerable.  Subsequent efforts to obtain more tolerable oral iron with Accrufer though this was not available until the last several days when the patient is seen 10/15/2024.      She was asked to return with studies 10/8/2024 H&H of 10.5 and 34.4, MCV of 81.9 and MCH of 25.0, ferritin of 15.3 and iron profile with 11% saturation.At this point the patient is better treated with IV iron.    The patient went on to be treated 10/15 and 10/22/2024 for total dose of 1020 mg Feraheme.  She is next seen 1/6 with  studies  including 11% iron saturation, ferritin now up to 16, soluble transferrin receptor 18.9 and CBC with H&H of 0.9 and 37.6 with white count 10,003 and 20 and platelet count of 185,000.  Unfortunately she remains still quite weak and fatigued despite her hemoglobin improving and we have just turned and that she would be further treated with additional IV Feraheme today x 1.  She is also having right upper quadrant pain which led to an MRI of the abdomen 2024 that, again, demonstrates extensive fatty filtration of liver, several hepatic cysts, no suspicious liver lesions nor intrahepatic or  extrahepatic biliary dilatation.  Additionally splenic size is normal is no lymphadenopathy no abnormality pancreas or adrenals.  Considering continued anemia and right upper quadrant pain follow-up with GI consultation be requested and likely colonoscopy.    The patient is seen back 2025 having recently started Mounjaro.  This was held as she cared for her brother who, unfortunately,  of liver cancer.  She herself had been seen by GI medicine continuing assessment for apparent fatty infiltration of the liver and is now scheduled for endoscopy the latter part of 2025.  Her follow-up iron studies include a ferritin of 423.9, iron profile with iron 68, saturation 24%, TIBC mildly reduced to 285.  Soluble transferrin receptor 17.1.  She is no longer iron deficient but is still having right upper quadrant pain.    Past Medical History:   Diagnosis Date    Allergic     Anemia     Anxiety     Arthritis     Arthritis of back     Arthritis of neck     CAD (coronary artery disease)     50% mid LAD by cath on 2017    Chronic pain disorder     Colon polyp  ?    Depression     Diabetes mellitus     Diverticulosis     Fatty liver 2018    GERD (gastroesophageal reflux disease)     H/O Anal fissure     Headache     Headache, tension-type     Hip arthrosis     Hyperlipidemia     Hypertension      Hypothyroidism     Irritable bowel syndrome     Knee swelling     Low back pain     Low back strain     Neuropathy in diabetes     Obesity     ALEXANDER (obstructive sleep apnea)     PAD (peripheral artery disease)     Stress fracture         Past Surgical History:   Procedure Laterality Date    ADENOIDECTOMY      CARDIAC CATHETERIZATION Left 08/14/2017    [facility]    CHOLECYSTECTOMY      COLONOSCOPY  2015    Diverticulosis, hemorrhoids    COLONOSCOPY W/ POLYPECTOMY  2020    ENDOSCOPY  2016    FOOT SURGERY      FRACTURE SURGERY      Stress fracture about 4 yrs ago    GALLBLADDER SURGERY  1991    HAND SURGERY      RECTOVAGINAL FISTULA REPAIR      SACROILIAC JOINT INJECTION Left 01/31/2024    Procedure: SACROILIAC JOINT INJECTION LEFT [id number];  Surgeon: Rhona Hammer MD;  Location: [facility];  Service: Pain Management;  Laterality: Left;    STEROID INJECTION HIP Left 11/21/2022    Procedure: HIP INJECTION UNDER FLUORO - left;  Surgeon: Rhona Hammer MD;  Location: [facility];  Service: Pain Management;  Laterality: Left;    STEROID INJECTION HIP Left 3/24/2025    Procedure: HIP INJECTION UNDER FLUORO - left;  Surgeon: Rhona Hammer MD;  Location: [facility];  Service: Pain Management;  Laterality: Left;    TONSILLECTOMY      TRIGGER FINGER RELEASE      TRIGGER POINT INJECTION      UPPER GASTROINTESTINAL ENDOSCOPY      VULVA BIOPSY      11/2021 Colonoscopy by Dr Hurley- rectal polyp    Current Outpatient Medications on File Prior to Visit   Medication Sig Dispense Refill    Acetaminophen (TYLENOL PO) Take 650 mg by mouth 2 (Two) Times a Day As Needed.      amLODIPine (NORVASC) 5 MG tablet Take 1 tablet by mouth once daily 90 tablet 1    aspirin 81 MG EC tablet Take 1 tablet by mouth Daily.      atorvastatin (LIPITOR) 40 MG tablet Take 1 tablet by mouth Every Night. 90 tablet 0    carvedilol (COREG) 3.125 MG tablet Take 1 tablet by mouth 2 (Two) Times a Day. 60 tablet 0    celecoxib (CeleBREX) 100 MG  capsule Take 1 capsule by mouth 2 (Two) Times a Day As Needed for Mild Pain. 14 capsule 0    cetirizine (zyrTEC) 5 MG tablet Take 1 tablet by mouth Daily.      clobetasol (TEMOVATE) 0.05 % cream Apply 1 application topically to the appropriate area as directed every night at bedtime. 45 g 0    colestipol (COLESTID) 1 g tablet Take 1 tablet by mouth 2 (Two) Times a Day. 180 tablet 1    Diclofenac Sodium (VOLTAREN) 1 % gel gel Apply 4 g topically to the appropriate area as directed 4 (Four) Times a Day.      dicyclomine (BENTYL) 10 MG capsule Take 1 capsule by mouth 3 (Three) Times a Day As Needed for Abdominal Cramping. 90 capsule 0    escitalopram (LEXAPRO) 20 MG tablet Take 1 tablet by mouth Daily. 90 tablet 1    esomeprazole (nexIUM) 40 MG capsule TAKE 1 CAPSULE BY MOUTH ONCE DAILY IN THE MORNING BEFORE BREAKFAST 90 capsule 0    estradiol (ESTRACE) 0.1 MG/GM vaginal cream       famotidine (PEPCID) 40 MG tablet Take 1 tablet by mouth At Night As Needed for Heartburn. 30 tablet 5    gabapentin (NEURONTIN) 300 MG capsule TAKE 1 CAPSULE BY MOUTH THREE TIMES DAILY 270 capsule 2    icosapent ethyl (Vascepa) 1 g capsule capsule Take 2 g by mouth 2 (Two) Times a Day With Meals. 120 capsule 11    levothyroxine (SYNTHROID, LEVOTHROID) 50 MCG tablet Take 1 tablet by mouth once daily 90 tablet 1    losartan (Cozaar) 25 MG tablet Take 1 tablet by mouth Daily. 90 tablet 0    metFORMIN ER (GLUCOPHAGE-XR) 500 MG 24 hr tablet Take 2 tablets daily with a meal 180 tablet 1    methocarbamol (ROBAXIN) 500 MG tablet Take 1 tablet by mouth 4 (Four) Times a Day.      multivitamin with minerals tablet tablet Take 1 tablet by mouth Daily.      nystatin (MYCOSTATIN) 150452 UNIT/GM ointment Apply 1 application topically to the appropriate area as directed 2 (Two) Times a Day. 30 g 0    nystatin-triamcinolone (MYCOLOG) 543307-3.1 UNIT/GM-% ointment APPLY ONE OINTMENT TWO TIMES DAILY      Tirzepatide 2.5 MG/0.5ML solution auto-injector Inject  2.5 mg under the skin into the appropriate area as directed 1 (One) Time Per Week. 2 mL 4    tiZANidine (ZANAFLEX) 2 MG tablet Take 1 or 2 tablets PO QHS tolerated 60 tablet 1     No current facility-administered medications on file prior to visit.        ALLERGIES:    Allergies   Allergen Reactions    Lidocaine Other (See Comments)     Does not work on pt    Keflex [Cephalexin] Rash    Penicillins Rash        Social History     Socioeconomic History    Marital status:      Spouse name: Haresh    Number of children: 2   Tobacco Use    Smoking status: Former     Current packs/day: 0.00     Average packs/day: 1 pack/day for 30.0 years (30.0 ttl pk-yrs)     Types: Cigarettes     Start date: 1965     Quit date: 1991     Years since quittin.8     Passive exposure: Past    Smokeless tobacco: Never    Tobacco comments:     quit in her 40's   Vaping Use    Vaping status: Never Used   Substance and Sexual Activity    Alcohol use: Not Currently     Comment: 1 drink every blue moon    Drug use: Never    Sexual activity: Not Currently     Partners: Male     Birth control/protection: None        Family History   Problem Relation Age of Onset    Heart disease Mother     Hypertension Mother     COPD Mother     Alcohol abuse Brother     Colon cancer Neg Hx     Colon polyps Neg Hx     Crohn's disease Neg Hx     Irritable bowel syndrome Neg Hx     Ulcerative colitis Neg Hx         Review of Systems   Constitutional:  Positive for diaphoresis and fatigue. Negative for fever and unexpected weight change.   HENT:  Positive for mouth sores (Dry mouth).    Eyes: Negative.    Respiratory:  Positive for cough and shortness of breath.         Recent URI   Cardiovascular:  Positive for palpitations (Recent holter).   Gastrointestinal:  Positive for abdominal pain and diarrhea.        Fatty food intolrance   Endocrine: Negative.    Genitourinary:  Positive for frequency.   Musculoskeletal:  Positive for myalgias (night  "cramps).   Neurological: Negative.    Hematological: Negative.    Psychiatric/Behavioral: Negative.          Objective     Vitals:    04/08/25 0835   BP: 137/79   Pulse: 70   Resp: 16   Temp: 97.8 °F (36.6 °C)   TempSrc: Oral   SpO2: 99%   Weight: 81.6 kg (179 lb 14.4 oz)   Height: 157.5 cm (62.01\")   PainSc: 0-No pain                 4/8/2025     8:34 AM   Current Status   ECOG score 0       Physical Exam  Constitutional:       Appearance: She is obese.   HENT:      Head: Normocephalic and atraumatic.      Nose: Nose normal.      Mouth/Throat:      Mouth: Mucous membranes are moist.      Pharynx: Oropharynx is clear.   Eyes:      Extraocular Movements: Extraocular movements intact.      Conjunctiva/sclera: Conjunctivae normal.      Pupils: Pupils are equal, round, and reactive to light.   Cardiovascular:      Rate and Rhythm: Normal rate and regular rhythm.      Pulses: Normal pulses.      Heart sounds: Normal heart sounds.   Pulmonary:      Effort: Pulmonary effort is normal.      Breath sounds: Normal breath sounds.   Abdominal:      General: Bowel sounds are normal.      Palpations: Abdomen is soft.   Musculoskeletal:         General: Normal range of motion.      Cervical back: Normal range of motion and neck supple.   Skin:     General: Skin is warm and dry.   Neurological:      General: No focal deficit present.      Mental Status: She is oriented to person, place, and time.   Psychiatric:         Mood and Affect: Mood normal.         Behavior: Behavior normal.           RECENT LABS:  Hematology WBC   Date Value Ref Range Status   04/01/2025 7.72 3.40 - 10.80 10*3/mm3 Final   04/13/2024 8.0 3.4 - 10.8 x10E3/uL Final   05/01/2023 7.49 4.5 - 11.0 10*3/uL Final     RBC   Date Value Ref Range Status   04/01/2025 4.43 3.77 - 5.28 10*6/mm3 Final   04/13/2024 4.59 3.77 - 5.28 x10E6/uL Final   05/01/2023 4.22 4.0 - 5.2 10*6/uL Final     Hemoglobin   Date Value Ref Range Status   04/01/2025 12.9 12.0 - 15.9 g/dL Final "   05/01/2023 10.7 (L) 12.0 - 16.0 g/dL Final     Hematocrit   Date Value Ref Range Status   04/01/2025 39.7 34.0 - 46.6 % Final   05/01/2023 33.9 (L) 36.0 - 46.0 % Final     Platelets   Date Value Ref Range Status   04/01/2025 223 140 - 450 10*3/mm3 Final   05/01/2023 233 140 - 440 10*3/uL Final          Assessment & Plan     74-year-old female with a history including GE reflux, gastroparesis, IBS, previous cholecystectomy, nonobstructive coronary artery disease, peripheral vascular disease, worsening leg cramps found to have variable anemia over the last several visits.  On occasion this has been thought to be iron deficient and she has been treated with oral iron on previous occasions.  Peripheral smear in office today reveals normocytic normochromic RBCs with mild polychromatophilia, microcytosis and hypochromia.  Leukocytes appear normal per number differential, platelets appear normal per number and size.    The patient's anemia is not progressive, and is suspected to be related to her chronic illnesses, but we do need to assess to try to determine whether we could improve this for her.    After discussion she will be sent back to laboratory obtaining CMP, EPO level, MATHEUS, PE, free serum light chains, MMA, sed rate, B12 level, MATHEUS, PE, free serum light chains.  She had been in her primary care's office today undergoing TSH, free T4, hemoglobin A1c, ferritin, iron profile as well.    Her studies included a CMP with blood glucose of 163, otherwise normal, total cholesterol 149, triglycerides 341, HDL 39, ferritin of 31.9, iron profile at 50% saturation, hemoglobin A1c of 8.0, TSH 0.677, free T41.15, B12 of 551, MMA of 114, IRF of 19.8, reticulocyte percentage 1.48, reticulocyte hemoglobin 31.2, paraprotein analysis with no monoclonal spike, free light chain kappa light 22.7, free lambda light chain 19.1 and kappa/lambda ratio of 1.19, EPO level 24.3, ESR 10, negative NEW comprehensive profile.    The patient is  seen 2024 with modest improvement in her hemoglobin hematocrit no significant abnormalities on additional testing.  There is likely a component of anemia of chronic disease present no additional intervention is not yet felt necessary.    She was reassessed in July now with evidence of iron deficiency and a trial of oral iron was not tolerable.  Subsequent efforts to obtain more tolerable oral iron with Accrufer though this was not available until the last several days when the patient is seen 10/15/2024.      She was asked to return with studies 10/8/2024 H&H of 10.5 and 34.4, MCV of 81.9 and MCH of 25.0, ferritin of 15.3 and iron profile with 11% saturation.At this point the patient is better treated with IV iron.    The patient went on to be treated 10/15 and 10/22/2024 for total dose of 1020 mg Feraheme.    She is next seen  with studies  including 11% iron saturation, ferritin now up to 16, soluble transferrin receptor 18.9 and CBC with H&H of 0.9 and 37.6 with white count 10,003 and 20 and platelet count of 185,000.  Unfortunately she remains still quite weak and fatigued despite her hemoglobin improving and we have just turned and that she would be further treated with additional IV Feraheme today x 1.  She is also having right upper quadrant pain which led to an MRI of the abdomen 2024 that, again, demonstrates extensive fatty filtration of liver, several hepatic cysts, no suspicious liver lesions nor intrahepatic or  extrahepatic biliary dilatation.  Additionally splenic size is normal is no lymphadenopathy no abnormality pancreas or adrenals.  Considering continued anemia and right upper quadrant pain follow-up with GI consultation be requested and likely colonoscopy.    The patient went on to be treated with IV iron 2025-Feraheme 510 mg x 1.    The patient is seen back 2025 having recently started Mounjaro.  This was held as she cared for her brother who, unfortunately,  of  liver cancer.  She herself had been seen by GI medicine continuing assessment for apparent fatty infiltration of the liver and is now scheduled for endoscopy the latter part of April 2025.  Her follow-up iron studies include a ferritin of 423.9, iron profile with iron 68, saturation 24%, TIBC mildly reduced to 285.  Soluble transferrin receptor 17.1.  She is no longer iron deficient but is still having right upper quadrant pain.  She is encouraged to undergo the endoscopy as requested by GI medicine.    Plan:  *Hold additional IV Feraheme at this point    *11 weeks-CBC, ferritin, iron profile    *12 weeks MD, possible Feraheme

## 2025-04-08 ENCOUNTER — OFFICE VISIT (OUTPATIENT)
Dept: ONCOLOGY | Facility: CLINIC | Age: 75
End: 2025-04-08
Payer: MEDICARE

## 2025-04-08 VITALS
OXYGEN SATURATION: 99 % | DIASTOLIC BLOOD PRESSURE: 79 MMHG | WEIGHT: 179.9 LBS | SYSTOLIC BLOOD PRESSURE: 137 MMHG | HEIGHT: 62 IN | TEMPERATURE: 97.8 F | RESPIRATION RATE: 16 BRPM | HEART RATE: 70 BPM | BODY MASS INDEX: 33.1 KG/M2

## 2025-04-08 DIAGNOSIS — D50.8 IRON DEFICIENCY ANEMIA SECONDARY TO INADEQUATE DIETARY IRON INTAKE: Primary | ICD-10-CM

## 2025-04-08 PROCEDURE — 1126F AMNT PAIN NOTED NONE PRSNT: CPT | Performed by: INTERNAL MEDICINE

## 2025-04-08 PROCEDURE — 99214 OFFICE O/P EST MOD 30 MIN: CPT | Performed by: INTERNAL MEDICINE

## 2025-04-08 NOTE — LETTER
April 8, 2025     Jesi Estes MD  [address]  [address]  [facility] [address]    Patient: Pearl Christiansen   YOB: 1950   Date of Visit: 4/8/2025     Dear Jesi Estes MD:       Thank you for referring Pearl Christiansen to me for evaluation. Below are the relevant portions of my assessment and plan of care.    If you have questions, please do not hesitate to call me. I look forward to following Pearl along with you.         Sincerely,        Anton Saleh MD        CC: VELVET Mckeon Michael D., MD  04/08/25 0955  Sign when Signing Visit        REASON FOR FOLLOW-UP: Multifactoral anemia including anemia of chronic disease and iron deficiency.    History of Present Illness   The patient is 73-year-old female followed with below medical history and seen by subspecialists including GI 3/23/2023 for GE reflux, gastroparesis and IBS as well as previous cholecystectomy with studies at that point including H&H 11.1 and 34.4 with MCV of 80.0 and MCH 25.8.  Her studies 4/3 include a ferritin at 18.4 and iron of 51.  At this point she was followed by cardiology seen 4/27/2023 with a history of nonobstructive coronary artery disease, sleep apnea on CPAP and  peripheral vascular disease with left lower extremity claudication.  She had been admitted to [facility] for epigastric discomfort undergoing nuclear stress test that was significant for possible infarct but no ischemia.  She is thought to have symptoms from GI origin and was relatively stable with plans to repeat echocardiogram.  This was obtained 5/10/2023 with LV SF of 57.8%, normal left ventricular cavity size and wall thickness, left ventricular diastolic function with grade 1 impaired relaxation.    She had further assessment 5/1/2023 with H&H 10.7 and 33.9, MCHC 25.4.    Patient was seen by primary care 10/6/2023 with leg cramps that were worsening treated symptomatically with electrolyte supplementation, PAD  had improvement graded exercise with the patient unable to tolerate Pletal.  She was reviewed by orthopedics 10/12/2023 for her worsening foot pain and thought possibly to have an occult fracture or stress fracture.  Plain films demonstrated degenerative changes with calcaneal spurs but no acute fracture.    Repeat studies 10/20/2023 with H&H of 10.8 and 33.9, MCH of 25.5, MCV 80.1, platelet count 247,000, free T41.01, TSH 1.680.  Her further cardiac exams were unremarkable 11/2/2023 of the patient continued to have fatigue and had respiratory issues treated by primary care 11/8/2023 with Mucinex and Flonase, subsequent reevaluation 11/13 by immediate care treated with prednisone and Zithromax.    We are asked to see the patient for her anemia and she is seen in office 11/28/2023.  We discussed her findings today in particular her variable anemia which is improved in office 11/28/2023.      After discussion she will be sent back to laboratory obtaining CMP, EPO level, MATHEUS, PE, free serum light chains, MMA, sed rate, B12 level, MATHEUS, PE, free serum light chains.  She had been in her primary care's office today undergoing TSH, free T4, hemoglobin A1c, ferritin, iron profile as well.    Her studies included a CMP with blood glucose of 163, otherwise normal, total cholesterol 149, triglycerides 341, HDL 39, ferritin of 31.9, iron profile at 50% saturation, hemoglobin A1c of 8.0, TSH 0.677, free T41.15, B12 of 551, MMA of 114, IRF of 19.8, reticulocyte percentage 1.48, reticulocyte hemoglobin 31.2, paraprotein analysis with no monoclonal spike, free light chain kappa light 22.7, free lambda light chain 19.1 and kappa/lambda ratio of 1.19, EPO level 24.3, ESR 10, negative NEW comprehensive profile.  Please note GI workup including previous colonoscopy.    The patient is seen in office 1/9/2024 feeling about the same with general fatigue as result of a busy job-pharmacy tech at [employer].  She is reassessed in  July now with evidence of iron deficiency and a trial of oral iron was not tolerable.  Subsequent efforts to obtain more tolerable oral iron with Accrufer though this was not available until the last several days when the patient is seen 10/15/2024.      She was asked to return with studies 10/8/2024 H&H of 10.5 and 34.4, MCV of 81.9 and MCH of 25.0, ferritin of 15.3 and iron profile with 11% saturation.At this point the patient is better treated with IV iron.    The patient went on to be treated 10/15 and 10/22/2024 for total dose of 1020 mg Feraheme.  She is next seen  with studies  including 11% iron saturation, ferritin now up to 16, soluble transferrin receptor 18.9 and CBC with H&H of 0.9 and 37.6 with white count 10,003 and 20 and platelet count of 185,000.  Unfortunately she remains still quite weak and fatigued despite her hemoglobin improving and we have just turned and that she would be further treated with additional IV Feraheme today x 1.  She is also having right upper quadrant pain which led to an MRI of the abdomen 2024 that, again, demonstrates extensive fatty filtration of liver, several hepatic cysts, no suspicious liver lesions nor intrahepatic or  extrahepatic biliary dilatation.  Additionally splenic size is normal is no lymphadenopathy no abnormality pancreas or adrenals.  Considering continued anemia and right upper quadrant pain follow-up with GI consultation be requested and likely colonoscopy.    The patient is seen back 2025 having recently started Mounjaro.  This was held as she cared for her brother who, unfortunately,  of liver cancer.  She herself had been seen by GI medicine continuing assessment for apparent fatty infiltration of the liver and is now scheduled for endoscopy the latter part of 2025.  Her follow-up iron studies include a ferritin of 423.9, iron profile with iron 68, saturation 24%, TIBC mildly reduced to 285.  Soluble transferrin receptor  17.1.  She is no longer iron deficient but is still having right upper quadrant pain.    Past Medical History:   Diagnosis Date   • Allergic    • Anemia    • Anxiety    • Arthritis    • Arthritis of back    • Arthritis of neck    • CAD (coronary artery disease)     50% mid LAD by cath on 8/14/2017   • Chronic pain disorder    • Colon polyp 2012 ?   • Depression    • Diabetes mellitus    • Diverticulosis    • Fatty liver 2018   • GERD (gastroesophageal reflux disease)    • H/O Anal fissure    • Headache    • Headache, tension-type    • Hip arthrosis    • Hyperlipidemia    • Hypertension    • Hypothyroidism    • Irritable bowel syndrome    • Knee swelling    • Low back pain    • Low back strain    • Neuropathy in diabetes    • Obesity    • ALEXANDER (obstructive sleep apnea)    • PAD (peripheral artery disease)    • Stress fracture         Past Surgical History:   Procedure Laterality Date   • ADENOIDECTOMY     • CARDIAC CATHETERIZATION Left 08/14/2017    [facility]   • CHOLECYSTECTOMY     • COLONOSCOPY  2015    Diverticulosis, hemorrhoids   • COLONOSCOPY W/ POLYPECTOMY  2020   • ENDOSCOPY  2016   • FOOT SURGERY     • FRACTURE SURGERY      Stress fracture about 4 yrs ago   • GALLBLADDER SURGERY  1991   • HAND SURGERY     • RECTOVAGINAL FISTULA REPAIR     • SACROILIAC JOINT INJECTION Left 01/31/2024    Procedure: SACROILIAC JOINT INJECTION LEFT 82644;  Surgeon: Rhona Hammer MD;  Location: [facility];  Service: Pain Management;  Laterality: Left;   • STEROID INJECTION HIP Left 11/21/2022    Procedure: HIP INJECTION UNDER FLUORO - left;  Surgeon: Rhona Hammer MD;  Location: [facility];  Service: Pain Management;  Laterality: Left;   • STEROID INJECTION HIP Left 3/24/2025    Procedure: HIP INJECTION UNDER FLUORO - left;  Surgeon: Rhona Hammer MD;  Location: [facility];  Service: Pain Management;  Laterality: Left;   • TONSILLECTOMY     • TRIGGER FINGER RELEASE     • TRIGGER POINT INJECTION     • UPPER  GASTROINTESTINAL ENDOSCOPY     • VULVA BIOPSY      11/2021 Colonoscopy by Dr Hurley- rectal polyp    Current Outpatient Medications on File Prior to Visit   Medication Sig Dispense Refill   • Acetaminophen (TYLENOL PO) Take 650 mg by mouth 2 (Two) Times a Day As Needed.     • amLODIPine (NORVASC) 5 MG tablet Take 1 tablet by mouth once daily 90 tablet 1   • aspirin 81 MG EC tablet Take 1 tablet by mouth Daily.     • atorvastatin (LIPITOR) 40 MG tablet Take 1 tablet by mouth Every Night. 90 tablet 0   • carvedilol (COREG) 3.125 MG tablet Take 1 tablet by mouth 2 (Two) Times a Day. 60 tablet 0   • celecoxib (CeleBREX) 100 MG capsule Take 1 capsule by mouth 2 (Two) Times a Day As Needed for Mild Pain. 14 capsule 0   • cetirizine (zyrTEC) 5 MG tablet Take 1 tablet by mouth Daily.     • clobetasol (TEMOVATE) 0.05 % cream Apply 1 application topically to the appropriate area as directed every night at bedtime. 45 g 0   • colestipol (COLESTID) 1 g tablet Take 1 tablet by mouth 2 (Two) Times a Day. 180 tablet 1   • Diclofenac Sodium (VOLTAREN) 1 % gel gel Apply 4 g topically to the appropriate area as directed 4 (Four) Times a Day.     • dicyclomine (BENTYL) 10 MG capsule Take 1 capsule by mouth 3 (Three) Times a Day As Needed for Abdominal Cramping. 90 capsule 0   • escitalopram (LEXAPRO) 20 MG tablet Take 1 tablet by mouth Daily. 90 tablet 1   • esomeprazole (nexIUM) 40 MG capsule TAKE 1 CAPSULE BY MOUTH ONCE DAILY IN THE MORNING BEFORE BREAKFAST 90 capsule 0   • estradiol (ESTRACE) 0.1 MG/GM vaginal cream      • famotidine (PEPCID) 40 MG tablet Take 1 tablet by mouth At Night As Needed for Heartburn. 30 tablet 5   • gabapentin (NEURONTIN) 300 MG capsule TAKE 1 CAPSULE BY MOUTH THREE TIMES DAILY 270 capsule 2   • icosapent ethyl (Vascepa) 1 g capsule capsule Take 2 g by mouth 2 (Two) Times a Day With Meals. 120 capsule 11   • levothyroxine (SYNTHROID, LEVOTHROID) 50 MCG tablet Take 1 tablet by mouth once daily 90 tablet 1    • losartan (Cozaar) 25 MG tablet Take 1 tablet by mouth Daily. 90 tablet 0   • metFORMIN ER (GLUCOPHAGE-XR) 500 MG 24 hr tablet Take 2 tablets daily with a meal 180 tablet 1   • methocarbamol (ROBAXIN) 500 MG tablet Take 1 tablet by mouth 4 (Four) Times a Day.     • multivitamin with minerals tablet tablet Take 1 tablet by mouth Daily.     • nystatin (MYCOSTATIN) 178936 UNIT/GM ointment Apply 1 application topically to the appropriate area as directed 2 (Two) Times a Day. 30 g 0   • nystatin-triamcinolone (MYCOLOG) 575498-7.1 UNIT/GM-% ointment APPLY ONE OINTMENT TWO TIMES DAILY     • Tirzepatide 2.5 MG/0.5ML solution auto-injector Inject 2.5 mg under the skin into the appropriate area as directed 1 (One) Time Per Week. 2 mL 4   • tiZANidine (ZANAFLEX) 2 MG tablet Take 1 or 2 tablets PO QHS tolerated 60 tablet 1     No current facility-administered medications on file prior to visit.        ALLERGIES:    Allergies   Allergen Reactions   • Lidocaine Other (See Comments)     Does not work on pt   • Keflex [Cephalexin] Rash   • Penicillins Rash        Social History     Socioeconomic History   • Marital status:      Spouse name: Haresh   • Number of children: 2   Tobacco Use   • Smoking status: Former     Current packs/day: 0.00     Average packs/day: 1 pack/day for 30.0 years (30.0 ttl pk-yrs)     Types: Cigarettes     Start date: 1965     Quit date: 1991     Years since quittin.8     Passive exposure: Past   • Smokeless tobacco: Never   • Tobacco comments:     quit in her 40's   Vaping Use   • Vaping status: Never Used   Substance and Sexual Activity   • Alcohol use: Not Currently     Comment: 1 drink every blue moon   • Drug use: Never   • Sexual activity: Not Currently     Partners: Male     Birth control/protection: None        Family History   Problem Relation Age of Onset   • Heart disease Mother    • Hypertension Mother    • COPD Mother    • Alcohol abuse Brother    • Colon cancer Neg Hx   "  • Colon polyps Neg Hx    • Crohn's disease Neg Hx    • Irritable bowel syndrome Neg Hx    • Ulcerative colitis Neg Hx         Review of Systems   Constitutional:  Positive for diaphoresis and fatigue. Negative for fever and unexpected weight change.   HENT:  Positive for mouth sores (Dry mouth).    Eyes: Negative.    Respiratory:  Positive for cough and shortness of breath.         Recent URI   Cardiovascular:  Positive for palpitations (Recent holter).   Gastrointestinal:  Positive for abdominal pain and diarrhea.        Fatty food intolrance   Endocrine: Negative.    Genitourinary:  Positive for frequency.   Musculoskeletal:  Positive for myalgias (night cramps).   Neurological: Negative.    Hematological: Negative.    Psychiatric/Behavioral: Negative.          Objective    Vitals:    04/08/25 0835   BP: 137/79   Pulse: 70   Resp: 16   Temp: 97.8 °F (36.6 °C)   TempSrc: Oral   SpO2: 99%   Weight: 81.6 kg (179 lb 14.4 oz)   Height: 157.5 cm (62.01\")   PainSc: 0-No pain                 4/8/2025     8:34 AM   Current Status   ECOG score 0       Physical Exam  Constitutional:       Appearance: She is obese.   HENT:      Head: Normocephalic and atraumatic.      Nose: Nose normal.      Mouth/Throat:      Mouth: Mucous membranes are moist.      Pharynx: Oropharynx is clear.   Eyes:      Extraocular Movements: Extraocular movements intact.      Conjunctiva/sclera: Conjunctivae normal.      Pupils: Pupils are equal, round, and reactive to light.   Cardiovascular:      Rate and Rhythm: Normal rate and regular rhythm.      Pulses: Normal pulses.      Heart sounds: Normal heart sounds.   Pulmonary:      Effort: Pulmonary effort is normal.      Breath sounds: Normal breath sounds.   Abdominal:      General: Bowel sounds are normal.      Palpations: Abdomen is soft.   Musculoskeletal:         General: Normal range of motion.      Cervical back: Normal range of motion and neck supple.   Skin:     General: Skin is warm and dry. "   Neurological:      General: No focal deficit present.      Mental Status: She is oriented to person, place, and time.   Psychiatric:         Mood and Affect: Mood normal.         Behavior: Behavior normal.           RECENT LABS:  Hematology WBC   Date Value Ref Range Status   04/01/2025 7.72 3.40 - 10.80 10*3/mm3 Final   04/13/2024 8.0 3.4 - 10.8 x10E3/uL Final   05/01/2023 7.49 4.5 - 11.0 10*3/uL Final     RBC   Date Value Ref Range Status   04/01/2025 4.43 3.77 - 5.28 10*6/mm3 Final   04/13/2024 4.59 3.77 - 5.28 x10E6/uL Final   05/01/2023 4.22 4.0 - 5.2 10*6/uL Final     Hemoglobin   Date Value Ref Range Status   04/01/2025 12.9 12.0 - 15.9 g/dL Final   05/01/2023 10.7 (L) 12.0 - 16.0 g/dL Final     Hematocrit   Date Value Ref Range Status   04/01/2025 39.7 34.0 - 46.6 % Final   05/01/2023 33.9 (L) 36.0 - 46.0 % Final     Platelets   Date Value Ref Range Status   04/01/2025 223 140 - 450 10*3/mm3 Final   05/01/2023 233 140 - 440 10*3/uL Final          Assessment & Plan    74-year-old female with a history including GE reflux, gastroparesis, IBS, previous cholecystectomy, nonobstructive coronary artery disease, peripheral vascular disease, worsening leg cramps found to have variable anemia over the last several visits.  On occasion this has been thought to be iron deficient and she has been treated with oral iron on previous occasions.  Peripheral smear in office today reveals normocytic normochromic RBCs with mild polychromatophilia, microcytosis and hypochromia.  Leukocytes appear normal per number differential, platelets appear normal per number and size.    The patient's anemia is not progressive, and is suspected to be related to her chronic illnesses, but we do need to assess to try to determine whether we could improve this for her.    After discussion she will be sent back to laboratory obtaining CMP, EPO level, MATHEUS, PE, free serum light chains, MMA, sed rate, B12 level, MATHEUS, PE, free serum light chains.   She had been in her primary care's office today undergoing TSH, free T4, hemoglobin A1c, ferritin, iron profile as well.    Her studies included a CMP with blood glucose of 163, otherwise normal, total cholesterol 149, triglycerides 341, HDL 39, ferritin of 31.9, iron profile at 50% saturation, hemoglobin A1c of 8.0, TSH 0.677, free T41.15, B12 of 551, MMA of 114, IRF of 19.8, reticulocyte percentage 1.48, reticulocyte hemoglobin 31.2, paraprotein analysis with no monoclonal spike, free light chain kappa light 22.7, free lambda light chain 19.1 and kappa/lambda ratio of 1.19, EPO level 24.3, ESR 10, negative NEW comprehensive profile.    The patient is seen 1/9/2024 with modest improvement in her hemoglobin hematocrit no significant abnormalities on additional testing.  There is likely a component of anemia of chronic disease present no additional intervention is not yet felt necessary.    She was reassessed in July now with evidence of iron deficiency and a trial of oral iron was not tolerable.  Subsequent efforts to obtain more tolerable oral iron with Accrufer though this was not available until the last several days when the patient is seen 10/15/2024.      She was asked to return with studies 10/8/2024 H&H of 10.5 and 34.4, MCV of 81.9 and MCH of 25.0, ferritin of 15.3 and iron profile with 11% saturation.At this point the patient is better treated with IV iron.    The patient went on to be treated 10/15 and 10/22/2024 for total dose of 1020 mg Feraheme.    She is next seen 1/6 with studies 12/21 including 11% iron saturation, ferritin now up to 16, soluble transferrin receptor 18.9 and CBC with H&H of 0.9 and 37.6 with white count 10,003 and 20 and platelet count of 185,000.  Unfortunately she remains still quite weak and fatigued despite her hemoglobin improving and we have just turned and that she would be further treated with additional IV Feraheme today x 1.  She is also having right upper quadrant pain  which led to an MRI of the abdomen 2024 that, again, demonstrates extensive fatty filtration of liver, several hepatic cysts, no suspicious liver lesions nor intrahepatic or  extrahepatic biliary dilatation.  Additionally splenic size is normal is no lymphadenopathy no abnormality pancreas or adrenals.  Considering continued anemia and right upper quadrant pain follow-up with GI consultation be requested and likely colonoscopy.    The patient went on to be treated with IV iron 2025-Feraheme 510 mg x 1.    The patient is seen back 2025 having recently started Mounjaro.  This was held as she cared for her brother who, unfortunately,  of liver cancer.  She herself had been seen by GI medicine continuing assessment for apparent fatty infiltration of the liver and is now scheduled for endoscopy the latter part of 2025.  Her follow-up iron studies include a ferritin of 423.9, iron profile with iron 68, saturation 24%, TIBC mildly reduced to 285.  Soluble transferrin receptor 17.1.  She is no longer iron deficient but is still having right upper quadrant pain.  She is encouraged to undergo the endoscopy as requested by GI medicine.    Plan:  *Hold additional IV Feraheme at this point    *11 weeks-CBC, ferritin, iron profile    *12 weeks MD, possible Feraheme

## 2025-04-10 ENCOUNTER — OFFICE VISIT (OUTPATIENT)
Dept: PAIN MEDICINE | Facility: CLINIC | Age: 75
End: 2025-04-10
Payer: MEDICARE

## 2025-04-10 ENCOUNTER — HOSPITAL ENCOUNTER (OUTPATIENT)
Dept: GENERAL RADIOLOGY | Facility: HOSPITAL | Age: 75
Discharge: HOME | End: 2025-04-10
Admitting: PHYSICIAN ASSISTANT
Payer: MEDICARE

## 2025-04-10 VITALS
OXYGEN SATURATION: 94 % | WEIGHT: 180.8 LBS | HEIGHT: 62 IN | DIASTOLIC BLOOD PRESSURE: 75 MMHG | SYSTOLIC BLOOD PRESSURE: 137 MMHG | BODY MASS INDEX: 33.27 KG/M2 | TEMPERATURE: 99 F | RESPIRATION RATE: 18 BRPM | HEART RATE: 67 BPM

## 2025-04-10 DIAGNOSIS — M46.1 SACROILIITIS: ICD-10-CM

## 2025-04-10 DIAGNOSIS — M54.2 NECK PAIN: Primary | ICD-10-CM

## 2025-04-10 DIAGNOSIS — M16.12 PRIMARY OSTEOARTHRITIS OF LEFT HIP: ICD-10-CM

## 2025-04-10 DIAGNOSIS — M62.838 MUSCLE SPASM: ICD-10-CM

## 2025-04-10 DIAGNOSIS — M54.2 NECK PAIN: ICD-10-CM

## 2025-04-10 DIAGNOSIS — N63.0 BREAST MASS IN FEMALE: Primary | ICD-10-CM

## 2025-04-10 PROCEDURE — 72052 X-RAY EXAM NECK SPINE 6/>VWS: CPT

## 2025-04-10 PROCEDURE — 99214 OFFICE O/P EST MOD 30 MIN: CPT | Performed by: PHYSICIAN ASSISTANT

## 2025-04-10 PROCEDURE — 1159F MED LIST DOCD IN RCRD: CPT | Performed by: PHYSICIAN ASSISTANT

## 2025-04-10 PROCEDURE — 1126F AMNT PAIN NOTED NONE PRSNT: CPT | Performed by: PHYSICIAN ASSISTANT

## 2025-04-10 PROCEDURE — 1160F RVW MEDS BY RX/DR IN RCRD: CPT | Performed by: PHYSICIAN ASSISTANT

## 2025-04-10 RX ORDER — MELOXICAM 7.5 MG/1
7.5 TABLET ORAL DAILY
COMMUNITY

## 2025-04-10 NOTE — PROGRESS NOTES
CHIEF COMPLAINT  Low back pain      Subjective   Pearl Christiansen is a 74 y.o. female  who presents to the office for follow-up of procedure.  She completed a LEFT Hip injection  on  3/24/2025 performed by Dr. Hammer for management of Hammer. Patient reports 100% ongoing relief from the procedure.  Patient is pleased to report that she is still obtaining over 80% reduction of left-sided sciatica pain since her last injection was performed.  Her primary pain complaint today is pain within the posterior cervical spine that has been present over the last several weeks and is progressively worsening.  She illustrates pain that radiates into the shoulders and into the bilateral scapular region and occasionally will go into the arms terminating at the elbows.    Her medication regimen consists of tizanidine 2 mg which she uses strictly as needed.    Pain today is 0/10 for the hip; 1/10 for the back and 3/10 for the neck.      Hip Pain   There was no injury mechanism. The pain is present in the left hip. The quality of the pain is described as burning. The pain is at a severity of 0/10. The patient is experiencing no pain. The pain has been Improving since onset. Pertinent negatives include no numbness. She reports no foreign bodies present. The symptoms are aggravated by movement.   Back Pain  This is a chronic problem. The current episode started more than 1 year ago. The problem occurs intermittently. The problem has been improved since onset. The pain is present in the sacro-iliac. The quality of the pain is described as aching. The pain does not radiate. The pain is at a severity of 1/10. The pain is mild. The pain is Worse during the day. The symptoms are aggravated by position and sitting. Pertinent negatives include no abdominal pain, dysuria, headaches, numbness or weakness.   Neck Pain   This is a new problem. The current episode started more than 1 month ago. The problem occurs constantly. The problem has been  "unchanged. The pain is associated with nothing. The pain is present in the midline, left side and right side. The quality of the pain is described as aching and burning. The pain is at a severity of 3/10. The pain is moderate. The symptoms are aggravated by position. The pain is Same all the time. Pertinent negatives include no headaches, numbness or weakness.        PEG Assessment   What number best describes your pain on average in the past week?0  What number best describes how, during the past week, pain has interfered with your enjoyment of life?0  What number best describes how, during the past week, pain has interfered with your general activity?  0    Review of Pertinent Medical Data ---  No imaging for review on today    The following portions of the patient's history were reviewed and updated as appropriate: allergies, current medications, past family history, past medical history, past social history, past surgical history, and problem list.    Review of Systems   Gastrointestinal:  Negative for abdominal pain, constipation and diarrhea.   Genitourinary:  Negative for difficulty urinating and dysuria.   Musculoskeletal:  Positive for neck pain. Negative for back pain.   Neurological:  Negative for weakness, numbness and headaches.   Psychiatric/Behavioral:  Negative for hallucinations and suicidal ideas. The patient is not nervous/anxious.      I have reviewed and confirmed the accuracy of the ROS as documented by the MA/LPN/RN DILCIA Valdivia   Vitals:    04/10/25 1105   BP: 137/75   Pulse: 67   Resp: 18   Temp: 99 °F (37.2 °C)   SpO2: 94%   Weight: 82 kg (180 lb 12.8 oz)   Height: 157.5 cm (62.01\")   PainSc: 0-No pain         Objective   Physical Exam  Vitals and nursing note reviewed.   Constitutional:       Appearance: Normal appearance. She is obese.   HENT:      Head: Normocephalic.   Musculoskeletal:      Cervical back: Spasms and tenderness (MODERATE  PAIN TO PALPATION OVER THE BILATERAL " CERVICAL MUSCULATURE) present. Decreased range of motion.        Back:    Neurological:      Mental Status: She is alert and oriented to person, place, and time.      Cranial Nerves: Cranial nerves 2-12 are intact.      Sensory: Sensation is intact.      Motor: Motor function is intact.      Gait: Gait is intact.   Psychiatric:         Mood and Affect: Mood normal.         Behavior: Behavior normal.         Thought Content: Thought content normal.         Judgment: Judgment normal.             Assessment & Plan   Diagnoses and all orders for this visit:    1. Neck pain (Primary)  -     XR Spine Cervical Complete With Flex Ext; Future  -     Ambulatory Referral to Physical Therapy for Evaluation & Treatment    2. Sacroiliitis    3. Primary osteoarthritis of left hip    4. Muscle spasm        Pearlakash Christiansen reports a pain score of 0.  Given her pain assessment as noted, treatment options were discussed and the following options were decided upon as a follow-up plan to address the patient's pain: continuation of current treatment plan for pain, referral to Physical Therapy, and use of non-medical modalities (ice, heat, stretching and/or behavior modifications).      PHQ-2 Depression Screening  Little interest or pleasure in doing things? Not at all   Feeling down, depressed, or hopeless? Nearly every day   PHQ-2 Total Score 3     PHQ-9 Depression Screening  Little interest or pleasure in doing things? Not at all   Feeling down, depressed, or hopeless? Nearly every day   PHQ-2 Total Score 3   Trouble falling or staying asleep, or sleeping too much? Not at all   Feeling tired or having little energy? Several days   Poor appetite or overeating? Nearly every day   Feeling bad about yourself - or that you are a failure or have let yourself or your family down? Not at all   Trouble concentrating on things, such as reading the newspaper or watching television? Several days   Moving or speaking so slowly that other people  could have noticed? Or the opposite - being so fidgety or restless that you have been moving around a lot more than usual? Not at all     Thoughts that you would be better off dead, or of hurting yourself in some way? Not at all   PHQ-9 Total Score 8   If you checked off any problems, how difficult have these problems made it for you to do your work, take care of things at home, or get along with other people? Somewhat difficult           --- Patient screened positive for depression based on a PHQ-9 score of 8 on 4/10/2025. Follow-up recommendations include: no  Follow-up required .      --- Follow-up 6 weeks or sooner for further evaluation  --- The patient proceed with cervical flexion-extension x-rays for persistent cervical spine pain.  I have also referred to physical therapy as I do feel that she may benefit from further evaluation and possible treatment recommendations to be made.  --- I also advised the patient that she may want to try to use tizanidine a little more consistently to determine if that helps with more of the pain radiating into the scapular region.  --- Depending upon her response to physical therapy the patient may require cervical MRI                  Dictated utilizing Dragon dictation.

## 2025-04-17 ENCOUNTER — PATIENT OUTREACH (OUTPATIENT)
Dept: CASE MANAGEMENT | Facility: OTHER | Age: 75
End: 2025-04-17
Payer: MEDICARE

## 2025-04-17 RX ORDER — ESOMEPRAZOLE MAGNESIUM 40 MG/1
40 CAPSULE, DELAYED RELEASE ORAL
Qty: 90 CAPSULE | Refills: 0 | Status: SHIPPED | OUTPATIENT
Start: 2025-04-17

## 2025-04-17 NOTE — OUTREACH NOTE
"AMBULATORY CASE MANAGEMENT NOTE    Names and Relationships of Patient/Support Persons: Contact: Rod Christiansensheldon Charles \"Ramiro\"; Relationship: Self -     Patient Outreach  RN-ACM outreach with patient. Patient states to have had improvement regarding hip pain following hip injection. Patient states to have neck pain and will be receiving outpatient PT for neck pain. Patient states to have GI appointment next week and scheduled for EGD and colonoscopy. Patient states no difficulty with fever; chest pain; SOB: appetite or sleeping. Patient state to be compliant with medications; but has not been using Mounjaro . Patient states to be monitoring blood pressure and blood sugars with blood pressure WNL's and blood sugars fluctuating. Reviewed with patient education and verbalized understanding. Patient states to appreciate outreach and requests additional outreach. No further questions voiced at this time.       Education Documentation  When to Seek Medical Attention, taught by Nallely Park, RN at 4/17/2025  4:34 PM.  Learner: Patient  Readiness: Acceptance  Method: Explanation  Response: Verbalizes Understanding    Participation with Disease Management Plan, taught by Nallely Park, RN at 4/17/2025  4:34 PM.  Learner: Patient  Readiness: Acceptance  Method: Explanation  Response: Verbalizes Understanding          Nallely VEGA  Ambulatory Case Management    4/17/2025, 16:34 EDT  "

## 2025-04-21 ENCOUNTER — TELEPHONE (OUTPATIENT)
Dept: PHYSICAL THERAPY | Facility: CLINIC | Age: 75
End: 2025-04-21

## 2025-04-21 NOTE — TELEPHONE ENCOUNTER
"  Caller: Pearl Christiansen \"Ramiro\"    Relationship: Self         What was the call regarding: SOMETHING CAME UP        "

## 2025-04-23 RX ORDER — CARVEDILOL 3.12 MG/1
3.12 TABLET ORAL 2 TIMES DAILY
Qty: 180 TABLET | Refills: 1 | Status: SHIPPED | OUTPATIENT
Start: 2025-04-23

## 2025-04-25 ENCOUNTER — LAB REQUISITION (OUTPATIENT)
Dept: LAB | Facility: HOSPITAL | Age: 75
End: 2025-04-25
Payer: MEDICARE

## 2025-04-25 ENCOUNTER — OUTSIDE FACILITY SERVICE (OUTPATIENT)
Dept: GASTROENTEROLOGY | Facility: CLINIC | Age: 75
End: 2025-04-25
Payer: MEDICARE

## 2025-04-25 ENCOUNTER — EXTERNAL PBMM DATA (OUTPATIENT)
Dept: PHARMACY | Facility: OTHER | Age: 75
End: 2025-04-25
Payer: MEDICARE

## 2025-04-25 DIAGNOSIS — K64.0 FIRST DEGREE HEMORRHOIDS: ICD-10-CM

## 2025-04-25 DIAGNOSIS — D12.3 BENIGN NEOPLASM OF TRANSVERSE COLON: ICD-10-CM

## 2025-04-25 DIAGNOSIS — R12 HEARTBURN: ICD-10-CM

## 2025-04-25 DIAGNOSIS — K57.30 DIVERTICULOSIS OF LARGE INTESTINE WITHOUT PERFORATION OR ABSCESS WITHOUT BLEEDING: ICD-10-CM

## 2025-04-25 DIAGNOSIS — D50.9 IRON DEFICIENCY ANEMIA, UNSPECIFIED: ICD-10-CM

## 2025-04-25 DIAGNOSIS — Z12.11 ENCOUNTER FOR SCREENING FOR MALIGNANT NEOPLASM OF COLON: ICD-10-CM

## 2025-04-25 DIAGNOSIS — D12.2 BENIGN NEOPLASM OF ASCENDING COLON: ICD-10-CM

## 2025-04-25 PROCEDURE — 43239 EGD BIOPSY SINGLE/MULTIPLE: CPT | Performed by: INTERNAL MEDICINE

## 2025-04-25 PROCEDURE — 45380 COLONOSCOPY AND BIOPSY: CPT | Performed by: INTERNAL MEDICINE

## 2025-04-25 PROCEDURE — 45385 COLONOSCOPY W/LESION REMOVAL: CPT | Performed by: INTERNAL MEDICINE

## 2025-04-25 PROCEDURE — 88305 TISSUE EXAM BY PATHOLOGIST: CPT | Performed by: INTERNAL MEDICINE

## 2025-04-28 LAB
CYTO UR: NORMAL
LAB AP CASE REPORT: NORMAL
LAB AP CLINICAL INFORMATION: NORMAL
PATH REPORT.FINAL DX SPEC: NORMAL
PATH REPORT.GROSS SPEC: NORMAL

## 2025-04-30 ENCOUNTER — TREATMENT (OUTPATIENT)
Dept: PHYSICAL THERAPY | Facility: CLINIC | Age: 75
End: 2025-04-30
Payer: MEDICARE

## 2025-04-30 DIAGNOSIS — M54.2 CERVICAL PAIN: Primary | ICD-10-CM

## 2025-04-30 NOTE — PROGRESS NOTES
Physical Therapy Initial Evaluation and Plan of Care  [address], [address]  [place] [address]    Patient: Pearl Christiansen   : 1950  Diagnosis/ICD-10 Code:  Cervical pain [M54.2]  Referring practitioner: DILCIA Paul  Date of Initial Visit: 2025  Today's Date: 2025  Patient seen for 1 session         Visit Diagnoses:    ICD-10-CM ICD-9-CM   1. Cervical pain  M54.2 723.1         Subjective Questionnaire: NDI:10      Subjective Evaluation    History of Present Illness  Mechanism of injury: Patient is a 74 year old female who presents with neck pain that has bothered her for a few months.  Denies an injury, reports that it's getting worse.  Reports that at work she is always looking up at her computer.  Reports seeing her ortho MD and was referred to PT.  X-rays were taken which showed degenerative changes.    Reports difficulty with overhead activities and looking up.  Denies any issues sleeping at night.    Medical Hx: HTN, DM  Denies neck or shoulder surgeries.      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    Palpation     Additional Palpation Details  TTP to bilateral UT.    Active Range of Motion   Cervical/Thoracic Spine   Cervical    Flexion: 41 degrees with pain  Extension: 46 degrees with pain  Left lateral flexion: 21 degrees with pain  Right lateral flexion: 18 degrees with pain  Left rotation: 46 degrees with pain  Right rotation: 54 degrees with pain  Left Shoulder   Flexion: Left shoulder active forward flexion: WNL.   Abduction: Left  shoulder active abduction: WNL.     Right Shoulder   Flexion: Right shoulder active forward flexion: WNL.   Abduction: Right shoulder active abduction: WNL.     Strength/Myotome Testing     Left Shoulder     Planes of Motion   Flexion: 4   Abduction: 4+   External rotation at 0°: 4   Internal rotation at 0°: 4+     Right Shoulder     Planes of Motion   Flexion: 4   Abduction: 4+   External rotation at 0°: 4   Internal rotation at 0°: 4+     Left Elbow   Flexion: 4+  Extension: 4+    Right Elbow   Flexion: 4+  Extension: 4+    Tests     Additional Tests Details  N/T at this time.          Assessment & Plan       Assessment  Impairments: abnormal or restricted ROM, activity intolerance, impaired physical strength, lacks appropriate home exercise program and pain with function   Assessment details: Patient is a 74 year old female who presents with c/o pain, TTP, limited cervical AROM and decreased shoulder ER strength which is limiting her ability to perform activities.  Barriers to therapy: none  Prognosis: good  Prognosis details: STG's to be met by 3 weeks  1)  Independent with HEP to show compliance  2)  Decrease pain by 50% or more to allow patient to perform self care and activities more comfortably  3)  Increase AROM of the cervical spine 5-10 degrees in all planes for improved ability to look in all directions at work and while driving  4)  Min to no TTP present to indicate improved healing response    LTG's to be met by 6 weeks  1)  Independent with HEP progression to show continued compliance  2)  Decrease pain by 75% or more to allow patient to return to activities and hobbies with minimal limitations   3)  Increase strength for shoulder ER to 4+/5 for improved shoulder stability with OH activities  4)  No TTP to indicate improved healing response  5)  Improve NDI score by 3 or more for improved perceived quality of life  6)  Patient to perform overhead activities without pain        Plan  Therapy options:  will be seen for skilled therapy services  Planned therapy interventions: strengthening, stretching, therapeutic activities, home exercise program, neuromuscular re-education and manual therapy  Frequency: 2x week  Duration in weeks: 6  Treatment plan discussed with: patient            Timed:         Manual Therapy:    0     mins  16741;     Therapeutic Exercise:    16     mins  80233;    Neuromuscular Devin:    8    mins  95655;    Therapeutic Activity:     0     mins  74618;     Gait Trainin     mins  63594;     Ultrasound:     0     mins  50713;          Un-Timed:  Electrical Stimulation:    0     mins  01291 ( );    Low Eval     18     Mins  19696  Mod Eval     0     Mins  32933  High Eval                       0     Mins  53231        Timed Treatment:   24   mins   Total Treatment:     42   mins          PT: Bob Brennan PT     [place] License [id number]  Electronically signed by Bob Brennan PT, 25, 9:17 AM EDT    Certification Period: 2025 thru 2025  I certify that the therapy services are furnished while this patient is under my care.  The services outlined above are required by this patient, and will be reviewed every 90 days.    Ryan Stevenson Pa  [facility] [place] [address]   NPI: [id number]      Bob Brennan PT   License number: [id number]        Physician Signature:__________________________________________________    PHYSICIAN: Ryan Stevenson PA      DATE:     Please sign and return via fax to [email or web address] . Thank you, [facility].

## 2025-05-08 RX ORDER — AMLODIPINE BESYLATE 5 MG/1
5 TABLET ORAL DAILY
Qty: 90 TABLET | Refills: 1 | Status: SHIPPED | OUTPATIENT
Start: 2025-05-08

## 2025-05-08 NOTE — TELEPHONE ENCOUNTER
Rx Refill Note  Requested Prescriptions     Pending Prescriptions Disp Refills    amLODIPine (NORVASC) 5 MG tablet [Pharmacy Med Name: amLODIPine Besylate 5 MG Oral Tablet] 90 tablet 1     Sig: Take 1 tablet by mouth once daily      Last office visit with prescribing clinician: 11/8/2024   Last telemedicine visit with prescribing clinician: Visit date not found   Next office visit with prescribing clinician: 6/3/2025                         Would you like a call back once the refill request has been completed: [] Yes [] No    If the office needs to give you a call back, can they leave a voicemail: [] Yes [] No    Jose Alberto Avendaño MA  05/08/25, 09:23 EDT

## 2025-05-09 DIAGNOSIS — Z87.19 HISTORY OF IBS: ICD-10-CM

## 2025-05-09 NOTE — TELEPHONE ENCOUNTER
Rx Refill Note  Requested Prescriptions     Pending Prescriptions Disp Refills    dicyclomine (BENTYL) 10 MG capsule [Pharmacy Med Name: Dicyclomine HCl 10 MG Oral Capsule] 90 capsule 0     Sig: TAKE 1 CAPSULE BY MOUTH THREE TIMES DAILY AS NEEDED FOR CRAMPING      Last office visit with prescribing clinician: 11/8/2024   Last telemedicine visit with prescribing clinician: Visit date not found   Next office visit with prescribing clinician: 6/3/2025                         Would you like a call back once the refill request has been completed: [] Yes [] No    If the office needs to give you a call back, can they leave a voicemail: [] Yes [] No    Ivonne Ty MA  05/09/25, 13:31 EDT

## 2025-05-10 RX ORDER — DICYCLOMINE HYDROCHLORIDE 10 MG/1
CAPSULE ORAL
Qty: 90 CAPSULE | Refills: 0 | Status: SHIPPED | OUTPATIENT
Start: 2025-05-10

## 2025-05-14 ENCOUNTER — PATIENT OUTREACH (OUTPATIENT)
Dept: CASE MANAGEMENT | Facility: OTHER | Age: 75
End: 2025-05-14
Payer: MEDICARE

## 2025-05-14 NOTE — OUTREACH NOTE
"AMBULATORY CASE MANAGEMENT NOTE    Names and Relationships of Patient/Support Persons: Contact: Pearl Christiansen \"Ramiro\"; Relationship: Self -     Patient Outreach  RN-ACM outreach with patient. Patient requests RN-ACM call back tomorrow as currently she is working and will be off work tomorrow. Additional outreach scheduled.         Nallely VEGA  Ambulatory Case Management    5/14/2025, 13:59 EDT  "

## 2025-05-15 ENCOUNTER — TREATMENT (OUTPATIENT)
Dept: PHYSICAL THERAPY | Facility: CLINIC | Age: 75
End: 2025-05-15
Payer: MEDICARE

## 2025-05-15 DIAGNOSIS — M54.2 CERVICAL PAIN: Primary | ICD-10-CM

## 2025-05-15 NOTE — PROGRESS NOTES
Physical Therapy Daily Treatment Note  [address], [address]  [place] [address]      Patient: Pearl Christiansen   : 1950  Referring practitioner: DILCIA Paul  Date of Initial Visit: Type: THERAPY  Noted: 2025  Today's Date: 5/15/2025  Patient seen for 2 sessions       Visit Diagnoses:    ICD-10-CM ICD-9-CM   1. Cervical pain  M54.2 723.1           Subjective   Patient reports soreness in the neck.  Rates the pain at 8/10 with movement, but denies pain at rest.    Objective   See Exercise, Manual, and Modality Logs for complete treatment.       Assessment/Plan  Subjective reports are quite elevated with movement.  Patient had minimal tolerance to the exercise routine, reporting pain in the cervical spine/both traps/upper thoracic spine with most of the exercises, but reports this is due to her not being up very long today.  Reiterated to the patient the importance of maintaining a pain free ROM as opposed to pushing the range of motion with increased pain.  Exercise progression was difficult due to increased pain with the exercises.  May consider manual cervical traction at the next visit.      Timed:         Manual Therapy:    0     mins  08863;     Therapeutic Exercise:    20     mins  59023;      Neuromuscular Devin:    8    mins  95431;    Therapeutic Activity:     0     mins  23123;     Gait Training      0    mins  19458;  Work Conditioning     0   mins  04927       Untimed:  Electrical Stimulation:    0     mins  06517 ( );      Timed Treatment:   28   mins   Total Treatment:     28   mins    Bob Brennan, PT  [place] License: [id number]

## 2025-05-16 ENCOUNTER — OFFICE VISIT (OUTPATIENT)
Dept: SURGERY | Facility: CLINIC | Age: 75
End: 2025-05-16
Payer: MEDICARE

## 2025-05-16 VITALS
OXYGEN SATURATION: 95 % | HEART RATE: 64 BPM | DIASTOLIC BLOOD PRESSURE: 70 MMHG | SYSTOLIC BLOOD PRESSURE: 142 MMHG | BODY MASS INDEX: 31.98 KG/M2 | WEIGHT: 173.8 LBS | HEIGHT: 62 IN

## 2025-05-16 DIAGNOSIS — N63.20 MASS OF LEFT BREAST, UNSPECIFIED QUADRANT: Primary | ICD-10-CM

## 2025-05-16 NOTE — PROGRESS NOTES
GENERAL SURGERY BENIGN BREAST HISTORY AND PHYSICAL     SUMMARY:  Pearl Christiansen is a 74 y.o. lady with:    (1) A new diagnosis of left breast mass:  -Left breast diagnostic mammogram and ultrasound on 4/7/2025 show a stable mass in the left breast that is probably benign.  Follow-up mammography in 6 months was recommended.  BI-RADS Category 3.  At that time, she will be due for her screening mammogram.  Plan for bilateral diagnostic mammogram due 9/2025.  -Plan for follow-up with me in 6 months.    (2) High risk screening:   -Tracieelmira Kale lifetime breast cancer risk: 3.1%. This patient does not qualify for high risk screening.      Referring Provider: Jesi Estes MD    Chief complaint: breast mass    HPI: Ms. Pearl Christiansen is seen at the request of Jesi Estes MD. The patient is a 74 y.o. woman being seen for a new diagnosis of left breast mass.      This was initially detected as an imaging abnormality on routine screening. Her work-up is detailed in the breast history section below. She has had regular annual mammograms. She denies any breast lumps, pain, skin changes, or nipple discharge.    She denies any family history of breast or ovarian cancer. She notes a possible paternal cousin may have had breast cancer.     TIMELINE OF WORKUP:  9/9/2022 bilateral mammogram:  Impression:  1.  There is no evidence for malignancy in either breast.  Routine follow-up mammography is recommended.  BI-RADS Category 1.    9/18/2024 bilateral diagnostic mammogram and ultrasound:  Impression:  Finding 1: Area of asymmetric breast tissue in the right breast is benign negative.  Finding 2: Mass in the left breast is probably benign.  Follow-up mammography in 6 months is recommended.  A limited breast ultrasound in 6 months is recommended.  BI-RADS Category 3.    4/7/2025 left breast diagnostic mammogram and ultrasound:  Impression:  Stable mass in the left breast is probably benign.  Follow-up mammography in 6 months  is recommended.  BI-RADS Category 3.    MEDICAL HISTORY:   Gynecologic History:   . P: 2  Age at first childbirth: 22  Lactation/How long: No  Age at menarche: 14  Age at menopause: Early 50s  Total years of oral contraceptive use: Yes, very short time  Total years of hormone replacement therapy: None    Past Medical History:   Past Medical History:   Diagnosis Date    Allergic     Anemia     Anxiety     Arthritis     Arthritis of back     Arthritis of neck     CAD (coronary artery disease)     50% mid LAD by cath on 2017    Chronic pain disorder     Colon polyp  ?    Depression     Diabetes mellitus     Diverticulosis     Fatty liver     GERD (gastroesophageal reflux disease)     H/O Anal fissure     Headache     Headache, tension-type     Hip arthrosis     Hyperlipidemia     Hypertension     Hypothyroidism     Irritable bowel syndrome     Knee swelling     Low back pain     Low back strain     Neck pain 4/10/2025    Neuropathy in diabetes     Obesity     ALEXANDER (obstructive sleep apnea)     PAD (peripheral artery disease)     Stress fracture         Past Surgical History:    Past Surgical History:   Procedure Laterality Date    ADENOIDECTOMY      CARDIAC CATHETERIZATION Left 2017    [facility]    CHOLECYSTECTOMY      COLONOSCOPY      Diverticulosis, hemorrhoids    COLONOSCOPY W/ POLYPECTOMY      ENDOSCOPY      FOOT SURGERY      FRACTURE SURGERY      Stress fracture about 4 yrs ago    GALLBLADDER SURGERY      HAND SURGERY      RECTOVAGINAL FISTULA REPAIR      SACROILIAC JOINT INJECTION Left 2024    Procedure: SACROILIAC JOINT INJECTION LEFT [id number];  Surgeon: Rhona Hammer MD;  [place] [facility] [place]  Service: Pain Management;  Laterality: Left;    STEROID INJECTION HIP Left 2022    Procedure: HIP INJECTION UNDER FLUORO - left;  Surgeon: Rhona Hammer MD;  [place] [facility] [place];  Service: Pain Management;  Laterality: Left;    STEROID INJECTION HIP Left  3/24/2025    Procedure: HIP INJECTION UNDER FLUORO - left;  Surgeon: Rhona Hammer MD;  Location[place] [facility] [place];  Service: Pain Management;  Laterality: Left;    TONSILLECTOMY      TRIGGER FINGER RELEASE      TRIGGER POINT INJECTION      UPPER GASTROINTESTINAL ENDOSCOPY      VULVA BIOPSY         Family History:    As above    Social History:   Denies tobacco use   Occasional alcohol use     Allergies:   Allergies   Allergen Reactions    Lidocaine Other (See Comments)     Does not work on pt    Keflex [Cephalexin] Rash    Penicillins Rash       Medications:     Current Outpatient Medications:     Acetaminophen (TYLENOL PO), Take 650 mg by mouth 2 (Two) Times a Day As Needed., Disp: , Rfl:     amLODIPine (NORVASC) 5 MG tablet, Take 1 tablet by mouth once daily, Disp: 90 tablet, Rfl: 1    aspirin 81 MG EC tablet, Take 1 tablet by mouth Daily., Disp: , Rfl:     atorvastatin (LIPITOR) 40 MG tablet, Take 1 tablet by mouth Every Night., Disp: 90 tablet, Rfl: 0    carvedilol (COREG) 3.125 MG tablet, Take 1 tablet by mouth twice daily, Disp: 180 tablet, Rfl: 1    cetirizine (zyrTEC) 5 MG tablet, Take 1 tablet by mouth Daily., Disp: , Rfl:     clobetasol (TEMOVATE) 0.05 % cream, Apply 1 application topically to the appropriate area as directed every night at bedtime., Disp: 45 g, Rfl: 0    colestipol (COLESTID) 1 g tablet, Take 1 tablet by mouth 2 (Two) Times a Day., Disp: 180 tablet, Rfl: 1    Diclofenac Sodium (VOLTAREN) 1 % gel gel, Apply 4 g topically to the appropriate area as directed 4 (Four) Times a Day., Disp: , Rfl:     dicyclomine (BENTYL) 10 MG capsule, TAKE 1 CAPSULE BY MOUTH THREE TIMES DAILY AS NEEDED FOR CRAMPING, Disp: 90 capsule, Rfl: 0    escitalopram (LEXAPRO) 20 MG tablet, Take 1 tablet by mouth Daily., Disp: 90 tablet, Rfl: 1    esomeprazole (nexIUM) 40 MG capsule, TAKE 1 CAPSULE BY MOUTH ONCE DAILY IN THE MORNING BEFORE BREAKFAST, Disp: 90 capsule, Rfl: 0    estradiol (ESTRACE) 0.1 MG/GM vaginal  cream, , Disp: , Rfl:     famotidine (PEPCID) 40 MG tablet, Take 1 tablet by mouth At Night As Needed for Heartburn., Disp: 30 tablet, Rfl: 5    gabapentin (NEURONTIN) 300 MG capsule, TAKE 1 CAPSULE BY MOUTH THREE TIMES DAILY, Disp: 270 capsule, Rfl: 2    icosapent ethyl (Vascepa) 1 g capsule capsule, Take 2 g by mouth 2 (Two) Times a Day With Meals., Disp: 120 capsule, Rfl: 11    levothyroxine (SYNTHROID, LEVOTHROID) 50 MCG tablet, Take 1 tablet by mouth once daily, Disp: 90 tablet, Rfl: 1    losartan (Cozaar) 25 MG tablet, Take 1 tablet by mouth Daily., Disp: 90 tablet, Rfl: 0    meloxicam (MOBIC) 7.5 MG tablet, Take 1 tablet by mouth Daily., Disp: , Rfl:     metFORMIN ER (GLUCOPHAGE-XR) 500 MG 24 hr tablet, Take 2 tablets daily with a meal, Disp: 180 tablet, Rfl: 1    methocarbamol (ROBAXIN) 500 MG tablet, Take 1 tablet by mouth 4 (Four) Times a Day., Disp: , Rfl:     multivitamin with minerals tablet tablet, Take 1 tablet by mouth Daily., Disp: , Rfl:     nystatin (MYCOSTATIN) 583951 UNIT/GM ointment, Apply 1 application topically to the appropriate area as directed 2 (Two) Times a Day., Disp: 30 g, Rfl: 0    nystatin-triamcinolone (MYCOLOG) 207504-3.1 UNIT/GM-% ointment, APPLY ONE OINTMENT TWO TIMES DAILY, Disp: , Rfl:     Tirzepatide 2.5 MG/0.5ML solution auto-injector, Inject 2.5 mg under the skin into the appropriate area as directed 1 (One) Time Per Week., Disp: 2 mL, Rfl: 4    tiZANidine (ZANAFLEX) 2 MG tablet, Take 1 or 2 tablets PO QHS tolerated, Disp: 60 tablet, Rfl: 1    Labs:    Labs from 4/1/2025 personally reviewed by me     ROS:   Constitutional: Negative for fevers or chills  HENT: Negative for hearing loss or runny nose  Eyes: Negative for vision changes or scleral icterus  Respiratory: Negative for cough or shortness of breath  Cardiovascular: Negative for chest pain or heart palpitations  Gastrointestinal: Negative for abdominal pain, nausea, vomiting, constipation, melena, or  hematochezia  Genitourinary: Negative for hematuria or dysuria  Musculoskeletal: Negative for joint swelling or gait instability  Neurologic: Negative for tremors or seizures  All other systems reviewed and negative    PHYSICAL EXAM:   Vitals:    05/16/25 1534   BP: 142/70   Pulse: 64   SpO2: 95%     Constitutional: Well-developed well-nourished, no acute distress  Eyes: Conjunctiva normal, sclera nonicteric  ENMT: Hearing grossly normal, oral mucosa moist  Neck: Supple, no palpable mass, trachea midline  Respiratory: Breathing comfortable, normal inspiratory effort, bilateral chest expansion  Cardiovascular: Regular rate, no peripheral edema, no jugular venous distention  Breast: symmetric  Right: No visible abnormalities on inspection while seated, with arms raised or hands on hips. No masses, skin changes, or nipple abnormalities.  Left:  No visible abnormalities on inspection while seated, with arms raised or hands on hips. No masses, skin changes, or nipple abnormalities.  No clinical chest wall involvement.  Lymphatics (palpable nodes): No cervical, supraclavicular or axillary lymphadenopathy  Skin:  Warm, dry, no rash on visualized skin surfaces  Musculoskeletal: Symmetric strength, normal gait  Psychiatric: Alert and oriented ×3, normal affect         Radha Akers PA-C    [facility] - General Surgery   [address], [address]  [place] [address]    [address], [address]  [place] [address]    Office: [phone]  Fax: [phone]

## 2025-05-19 RX ORDER — LEVOTHYROXINE SODIUM 50 UG/1
50 TABLET ORAL DAILY
Qty: 90 TABLET | Refills: 0 | Status: SHIPPED | OUTPATIENT
Start: 2025-05-19

## 2025-05-19 NOTE — TELEPHONE ENCOUNTER
Rx Refill Note  Requested Prescriptions     Pending Prescriptions Disp Refills    levothyroxine (SYNTHROID, LEVOTHROID) 50 MCG tablet [Pharmacy Med Name: Levothyroxine Sodium 50 MCG Oral Tablet] 90 tablet 0     Sig: Take 1 tablet by mouth once daily      Last office visit with prescribing clinician: 11/8/2024   Last telemedicine visit with prescribing clinician: Visit date not found   Next office visit with prescribing clinician: 6/3/2025                         Would you like a call back once the refill request has been completed: [] Yes [] No    If the office needs to give you a call back, can they leave a voicemail: [] Yes [] No    William Membreno MA  05/19/25, 08:56 EDT

## 2025-05-20 DIAGNOSIS — E78.00 PURE HYPERCHOLESTEROLEMIA: ICD-10-CM

## 2025-05-20 DIAGNOSIS — E03.8 OTHER SPECIFIED HYPOTHYROIDISM: ICD-10-CM

## 2025-05-20 DIAGNOSIS — Z11.59 NEED FOR HEPATITIS C SCREENING TEST: ICD-10-CM

## 2025-05-20 DIAGNOSIS — E11.65 TYPE 2 DIABETES MELLITUS WITH HYPERGLYCEMIA, WITHOUT LONG-TERM CURRENT USE OF INSULIN: ICD-10-CM

## 2025-05-20 DIAGNOSIS — I10 HYPERTENSION, UNSPECIFIED TYPE: ICD-10-CM

## 2025-05-22 DIAGNOSIS — N63.20 MASS OF LEFT BREAST, UNSPECIFIED QUADRANT: Primary | ICD-10-CM

## 2025-05-22 DIAGNOSIS — R92.8 OTHER ABNORMAL AND INCONCLUSIVE FINDINGS ON DIAGNOSTIC IMAGING OF BREAST: ICD-10-CM

## 2025-05-23 ENCOUNTER — OFFICE VISIT (OUTPATIENT)
Dept: CARDIOLOGY | Age: 75
End: 2025-05-23
Payer: MEDICARE

## 2025-05-23 ENCOUNTER — TELEPHONE (OUTPATIENT)
Dept: FAMILY MEDICINE CLINIC | Facility: CLINIC | Age: 75
End: 2025-05-23

## 2025-05-23 VITALS
BODY MASS INDEX: 31.98 KG/M2 | HEIGHT: 62 IN | WEIGHT: 173.8 LBS | DIASTOLIC BLOOD PRESSURE: 72 MMHG | SYSTOLIC BLOOD PRESSURE: 140 MMHG | OXYGEN SATURATION: 95 %

## 2025-05-23 DIAGNOSIS — I25.10 CORONARY ARTERY DISEASE INVOLVING NATIVE CORONARY ARTERY OF NATIVE HEART WITHOUT ANGINA PECTORIS: ICD-10-CM

## 2025-05-23 DIAGNOSIS — E78.00 PURE HYPERCHOLESTEROLEMIA: ICD-10-CM

## 2025-05-23 DIAGNOSIS — I49.1 PREMATURE ATRIAL CONTRACTION: ICD-10-CM

## 2025-05-23 DIAGNOSIS — E11.65 TYPE 2 DIABETES MELLITUS WITH HYPERGLYCEMIA, WITHOUT LONG-TERM CURRENT USE OF INSULIN: Primary | ICD-10-CM

## 2025-05-23 NOTE — TELEPHONE ENCOUNTER
"         Hub staff attempted to follow warm transfer process and was unsuccessful     Caller: Pearl Christianesn \"Ramiro\"    Relationship to patient: Self    Best call back number: [phone]     Patient is needing: PATIENT WOULD TO COME IN FOR LABS TODAY . PLEASE CALL TO ADVISE.         "

## 2025-05-23 NOTE — PROGRESS NOTES
[facility]    Subjective:     Encounter Date:05/23/25      Patient ID: Pearl Christiansen is a 74 y.o. female.    Chief Complaint:   Chief Complaint   Patient presents with    Type 2 diabetes mellitus with hyperglycemia, without long-t     Patient is in the office today for her 6 month follow up appointment.     Follow-up palpitations  History of Present Illness    Ms. Garcia is a pleasant 74 y.o. lady past medical history hypertension, diabetes, nonobstructive coronary artery disease, sleep apnea on CPAP, peripheral vascular disease with left lower extremity claudication who presents for follow-up    She previously followed with Dr. Hutson in our office.  She has had a episodes of epigastric discomfort which have been on and off over the last several years.  Due to her nonobstructive CAD, she was evaluated for cardiac cause.  She was admitted to [facility] for this and she underwent a Lexiscan nuclear stress test which showed a possible apical infarct but no ischemia.  It was thought to be GI origin.    She has been investigated for palpitations in the past and has had Holter's that show intermittent PACs, but nothing more significant.  She has a KardiaMobile which she will take every now and then has not registered any significant abnormalities.    She was not able to tolerate the Vascepa due to reflux symptoms that occurred with it.  She is otherwise doing fine today.    Cardiac Procedures:  Left cardiac catheterization in [place] 8/14/2017.  Left main was normal.  LAD 50% mid disease.  Circumflex had luminal irregularities.  RCA was dominant with luminal irregularities.  CTA chest at [facility] 10/5/2021.  No PE or aortic pathology noted.  Myocardial perfusion stress test 10/6/2021.  Small apical defect consistent with artifact.  No ischemia.  Echocardiogram 11/24/2021.  LVEF 61-65%.  Mild hypertrophy.  Grade 1 diastolic dysfunction.  Normal RV cavity size and systolic  function.  No evidence of pericardial effusion.  24-hour monitor 11/30/2021.  Normal monitor study.  Patient had total of 7 PACs.  ZIO monitor 1/30/2022.  Underlying rhythm was sinus with average heart rate 85.  SVT ectopy less than 1%  Echocardiogram May 10, 2023: Normal LVEF 57.8%.  Grade 1 diastolic dysfunction.  Otherwise unremarkable.  Echo May 10, 2023:  LVEF 58%, grade 1 diastolic dysfunction, otherwise normal    The following portions of the patient's history were reviewed and updated as appropriate: allergies, current medications, past family history, past medical history, past social history, past surgical history and problem list.    Past Medical History:   Diagnosis Date    Allergic     Penicillin    Anemia     Anxiety     Arthritis     Arthritis of back     Arthritis of neck     CAD (coronary artery disease)     50% mid LAD by cath on 8/14/2017    Cataract     Chronic diarrhea     Chronic pain disorder     Colon polyp 2012 ?    Depression     Diabetes mellitus     Diverticulosis     Fatty liver 2018    GERD (gastroesophageal reflux disease)     H/O Anal fissure     Headache     Headache, tension-type     Hip arthrosis     Hyperlipidemia     Hypertension     Hypothyroidism     Irritable bowel syndrome     Knee swelling     Low back pain     Low back strain     Neck pain 04/10/2025    Neuropathy in diabetes     Obesity     ALEXANDER (obstructive sleep apnea)     PAD (peripheral artery disease)     Stress fracture        Past Surgical History:   Procedure Laterality Date    ADENOIDECTOMY      CARDIAC CATHETERIZATION Left 08/14/2017    [facility]    CHOLECYSTECTOMY      COLONOSCOPY  2015    Diverticulosis, hemorrhoids    COLONOSCOPY W/ POLYPECTOMY  2020    ENDOSCOPY  2016    FOOT SURGERY      FRACTURE SURGERY      Stress fracture about 4 yrs ago    GALLBLADDER SURGERY  1991    HAND SURGERY      RECTOVAGINAL FISTULA REPAIR      SACROILIAC JOINT INJECTION Left 01/31/2024    Procedure: SACROILIAC JOINT INJECTION  "LEFT [id number];  Surgeon: Rhona Hammer MD;  Location: [facility];  Service: Pain Management;  Laterality: Left;    STEROID INJECTION HIP Left 11/21/2022    Procedure: HIP INJECTION UNDER FLUORO - left;  Surgeon: Rhona Hammer MD;  Location: [facility];  Service: Pain Management;  Laterality: Left;    STEROID INJECTION HIP Left 3/24/2025    Procedure: HIP INJECTION UNDER FLUORO - left;  Surgeon: Rhona Hammer MD;  Location: [facility];  Service: Pain Management;  Laterality: Left;    TONSILLECTOMY      TRIGGER FINGER RELEASE      TRIGGER POINT INJECTION      UPPER GASTROINTESTINAL ENDOSCOPY      VULVA BIOPSY           Procedures       Objective:     Vitals:    05/23/25 1049   BP: 140/72   BP Location: Left arm   Patient Position: Sitting   Cuff Size: Adult   SpO2: 95%   Weight: 78.8 kg (173 lb 12.8 oz)   Height: 157.5 cm (62.01\")           Constitutional:       Appearance: Healthy appearance. Not in distress.   Neck:      Vascular: JVD normal.   Pulmonary:      Effort: Pulmonary effort is normal.      Breath sounds: Normal breath sounds.   Cardiovascular:      PMI at left midclavicular line. Normal rate. Regular rhythm. Normal S2.       Murmurs: There is a grade 1/6 harsh midsystolic murmur at the URSB, radiating to the neck.      Comments: No carotid bruits detected  Pulses:     Intact distal pulses.   Edema:     Peripheral edema absent.   Skin:     General: Skin is warm and dry.   Neurological:      General: No focal deficit present.      Mental Status: Alert, oriented to person, place, and time and oriented to person, place and time.   Psychiatric:         Mood and Affect: Mood and affect normal.         Lab Review:     Lipid Panel          11/19/2024    10:07 3/17/2025    11:21   Lipid Panel   Total Cholesterol 151     Triglycerides 306  381    HDL Cholesterol 42     VLDL Cholesterol 48     LDL Cholesterol  61     LDL/HDL Ratio 1.14       BUN   Date Value Ref Range Status   11/26/2024 20 8 - 23 " mg/dL Final   11/19/2024 21 8 - 23 mg/dL Final   10/06/2021 18 10 - 20 mg/dL Final     Creatinine   Date Value Ref Range Status   12/17/2024 0.90 0.60 - 1.30 mg/dL Final     Comment:     Serial Number: [id number]:  [id number]   10/06/2021 0.84 0.55 - 1.02 mg/dL Final   10/22/2019 1.02 0.57 - 1.11 mg/dL Final     Potassium   Date Value Ref Range Status   11/26/2024 4.4 3.5 - 5.2 mmol/L Final   11/19/2024 4.6 3.5 - 5.2 mmol/L Final   10/06/2021 4.4 3.5 - 5.1 mmol/L Final     ALT (SGPT)   Date Value Ref Range Status   11/19/2024 19 1 - 33 U/L Final   10/05/2021 14 0 - 55 U/L Final     AST (SGOT)   Date Value Ref Range Status   11/19/2024 26 1 - 32 U/L Final   10/05/2021 17 5 - 34 U/L Final         Performed        Assessment:          Diagnosis Plan   1. Type 2 diabetes mellitus with hyperglycemia, without long-term current use of insulin        2. Premature atrial contraction        3. Pure hypercholesterolemia        4. Coronary artery disease involving native coronary artery of native heart without angina pectoris                     Plan:         Coronary artery disease, nonobstructive: Possible apical infarct on SPECT at [place] 2021, but this was not revealed on echocardiogram.    Free of angina.  Echo showed no infarction.      Cardiac cath 2017 showed 50% mid LAD lesion      Aspirin 81  Cardiac murmur.  Likely flow murmur.  Echo was unremarkable.  Palpitations, PACs  Continue carvedilol.  She reports mild improvement in her symptoms with this.    We have not captured any other significant arrhythmias besides occasional PACs.      Peripheral vascular disease, hyperlipidemia: LDL controlled on most recent evaluation.    Continue Lipitor 40, goal LDL less than 70, closer to 55  She did not tolerate Vascepa due to GERD symptoms and reflux    She takes colestipol for history of cholecystectomy     RTC 1 year.  She is doing well overall from the cardiac standpoint.  Harvinder Ramon MD  [facility]  05/23/25  08:59 EDT       Current Outpatient Medications:     Acetaminophen (TYLENOL PO), Take 650 mg by mouth 2 (Two) Times a Day As Needed., Disp: , Rfl:     amLODIPine (NORVASC) 5 MG tablet, Take 1 tablet by mouth once daily, Disp: 90 tablet, Rfl: 1    aspirin 81 MG EC tablet, Take 1 tablet by mouth Daily., Disp: , Rfl:     atorvastatin (LIPITOR) 40 MG tablet, Take 1 tablet by mouth Every Night., Disp: 90 tablet, Rfl: 0    carvedilol (COREG) 3.125 MG tablet, Take 1 tablet by mouth twice daily, Disp: 180 tablet, Rfl: 1    cetirizine (zyrTEC) 5 MG tablet, Take 1 tablet by mouth Daily., Disp: , Rfl:     clobetasol (TEMOVATE) 0.05 % cream, Apply 1 application topically to the appropriate area as directed every night at bedtime., Disp: 45 g, Rfl: 0    colestipol (COLESTID) 1 g tablet, Take 1 tablet by mouth 2 (Two) Times a Day., Disp: 180 tablet, Rfl: 1    Diclofenac Sodium (VOLTAREN) 1 % gel gel, Apply 4 g topically to the appropriate area as directed 4 (Four) Times a Day., Disp: , Rfl:     dicyclomine (BENTYL) 10 MG capsule, TAKE 1 CAPSULE BY MOUTH THREE TIMES DAILY AS NEEDED FOR CRAMPING, Disp: 90 capsule, Rfl: 0    escitalopram (LEXAPRO) 20 MG tablet, Take 1 tablet by mouth Daily., Disp: 90 tablet, Rfl: 1    esomeprazole (nexIUM) 40 MG capsule, TAKE 1 CAPSULE BY MOUTH ONCE DAILY IN THE MORNING BEFORE BREAKFAST, Disp: 90 capsule, Rfl: 0    estradiol (ESTRACE) 0.1 MG/GM vaginal cream, , Disp: , Rfl:     famotidine (PEPCID) 40 MG tablet, Take 1 tablet by mouth At Night As Needed for Heartburn., Disp: 30 tablet, Rfl: 5    gabapentin (NEURONTIN) 300 MG capsule, TAKE 1 CAPSULE BY MOUTH THREE TIMES DAILY, Disp: 270 capsule, Rfl: 2    levothyroxine (SYNTHROID, LEVOTHROID) 50 MCG tablet, Take 1 tablet by mouth once daily, Disp: 90 tablet, Rfl: 0    losartan (Cozaar) 25 MG tablet, Take 1 tablet by mouth Daily., Disp: 90 tablet, Rfl: 0    metFORMIN ER (GLUCOPHAGE-XR) 500 MG 24 hr tablet, Take 2 tablets daily with a  meal, Disp: 180 tablet, Rfl: 1    methocarbamol (ROBAXIN) 500 MG tablet, Take 1 tablet by mouth 4 (Four) Times a Day., Disp: , Rfl:     multivitamin with minerals tablet tablet, Take 1 tablet by mouth Daily., Disp: , Rfl:     nystatin (MYCOSTATIN) 838684 UNIT/GM ointment, Apply 1 application topically to the appropriate area as directed 2 (Two) Times a Day., Disp: 30 g, Rfl: 0    nystatin-triamcinolone (MYCOLOG) 795438-0.1 UNIT/GM-% ointment, APPLY ONE OINTMENT TWO TIMES DAILY, Disp: , Rfl:     Tirzepatide 2.5 MG/0.5ML solution auto-injector, Inject 2.5 mg under the skin into the appropriate area as directed 1 (One) Time Per Week., Disp: 2 mL, Rfl: 4    tiZANidine (ZANAFLEX) 2 MG tablet, Take 1 or 2 tablets PO QHS tolerated, Disp: 60 tablet, Rfl: 1         Return in about 1 year (around 5/23/2026).      Part of this note may be an electronic transcription/translation of spoken language to printed text using the Dragon Dictation System.

## 2025-05-31 LAB
ALBUMIN SERPL-MCNC: 4.3 G/DL (ref 3.5–5.2)
ALBUMIN/GLOB SERPL: 1.5 G/DL
ALP SERPL-CCNC: 80 U/L (ref 39–117)
ALT SERPL-CCNC: 18 U/L (ref 1–33)
AST SERPL-CCNC: 23 U/L (ref 1–32)
BASOPHILS # BLD AUTO: 0.06 10*3/MM3 (ref 0–0.2)
BASOPHILS NFR BLD AUTO: 0.9 % (ref 0–1.5)
BILIRUB SERPL-MCNC: 0.4 MG/DL (ref 0–1.2)
BUN SERPL-MCNC: 17 MG/DL (ref 8–23)
BUN/CREAT SERPL: 18.1 (ref 7–25)
CALCIUM SERPL-MCNC: 9.2 MG/DL (ref 8.6–10.5)
CHLORIDE SERPL-SCNC: 101 MMOL/L (ref 98–107)
CHOLEST SERPL-MCNC: 132 MG/DL (ref 0–200)
CO2 SERPL-SCNC: 27.1 MMOL/L (ref 22–29)
CREAT SERPL-MCNC: 0.94 MG/DL (ref 0.57–1)
EGFRCR SERPLBLD CKD-EPI 2021: 64.6 ML/MIN/1.73
EOSINOPHIL # BLD AUTO: 0.35 10*3/MM3 (ref 0–0.4)
EOSINOPHIL NFR BLD AUTO: 5.1 % (ref 0.3–6.2)
ERYTHROCYTE [DISTWIDTH] IN BLOOD BY AUTOMATED COUNT: 13 % (ref 12.3–15.4)
GLOBULIN SER CALC-MCNC: 2.8 GM/DL
GLUCOSE SERPL-MCNC: 128 MG/DL (ref 65–99)
HBA1C MFR BLD: 7 % (ref 4.8–5.6)
HCT VFR BLD AUTO: 37.3 % (ref 34–46.6)
HCV IGG SERPL QL IA: NON REACTIVE
HDLC SERPL-MCNC: 38 MG/DL (ref 40–60)
HGB BLD-MCNC: 12.6 G/DL (ref 12–15.9)
IMM GRANULOCYTES # BLD AUTO: 0.02 10*3/MM3 (ref 0–0.05)
IMM GRANULOCYTES NFR BLD AUTO: 0.3 % (ref 0–0.5)
LDLC SERPL CALC-MCNC: 50 MG/DL (ref 0–100)
LDLC/HDLC SERPL: 0.98 {RATIO}
LYMPHOCYTES # BLD AUTO: 1.66 10*3/MM3 (ref 0.7–3.1)
LYMPHOCYTES NFR BLD AUTO: 24.1 % (ref 19.6–45.3)
MCH RBC QN AUTO: 29.8 PG (ref 26.6–33)
MCHC RBC AUTO-ENTMCNC: 33.8 G/DL (ref 31.5–35.7)
MCV RBC AUTO: 88.2 FL (ref 79–97)
MONOCYTES # BLD AUTO: 0.58 10*3/MM3 (ref 0.1–0.9)
MONOCYTES NFR BLD AUTO: 8.4 % (ref 5–12)
NEUTROPHILS # BLD AUTO: 4.22 10*3/MM3 (ref 1.7–7)
NEUTROPHILS NFR BLD AUTO: 61.2 % (ref 42.7–76)
NRBC BLD AUTO-RTO: 0 /100 WBC (ref 0–0.2)
PLATELET # BLD AUTO: 250 10*3/MM3 (ref 140–450)
POTASSIUM SERPL-SCNC: 4.1 MMOL/L (ref 3.5–5.2)
PROT SERPL-MCNC: 7.1 G/DL (ref 6–8.5)
RBC # BLD AUTO: 4.23 10*6/MM3 (ref 3.77–5.28)
SODIUM SERPL-SCNC: 142 MMOL/L (ref 136–145)
T4 FREE SERPL-MCNC: 0.91 NG/DL (ref 0.92–1.68)
TRIGL SERPL-MCNC: 283 MG/DL (ref 0–150)
TSH SERPL DL<=0.005 MIU/L-ACNC: 1.96 UIU/ML (ref 0.27–4.2)
VLDLC SERPL CALC-MCNC: 44 MG/DL (ref 5–40)
WBC # BLD AUTO: 6.89 10*3/MM3 (ref 3.4–10.8)

## 2025-06-02 DIAGNOSIS — Z87.19 HISTORY OF IBS: ICD-10-CM

## 2025-06-02 DIAGNOSIS — I10 HYPERTENSION, UNSPECIFIED TYPE: ICD-10-CM

## 2025-06-02 RX ORDER — DICYCLOMINE HYDROCHLORIDE 10 MG/1
CAPSULE ORAL
Qty: 90 CAPSULE | Refills: 1 | Status: SHIPPED | OUTPATIENT
Start: 2025-06-02

## 2025-06-02 RX ORDER — LOSARTAN POTASSIUM 25 MG/1
25 TABLET ORAL DAILY
Qty: 90 TABLET | Refills: 1 | Status: SHIPPED | OUTPATIENT
Start: 2025-06-02

## 2025-06-02 RX ORDER — ESCITALOPRAM OXALATE 20 MG/1
20 TABLET ORAL DAILY
Qty: 90 TABLET | Refills: 1 | Status: SHIPPED | OUTPATIENT
Start: 2025-06-02

## 2025-06-02 NOTE — TELEPHONE ENCOUNTER
Rx Refill Note  Requested Prescriptions     Pending Prescriptions Disp Refills    escitalopram (LEXAPRO) 20 MG tablet [Pharmacy Med Name: Escitalopram Oxalate 20 MG Oral Tablet] 90 tablet 1     Sig: Take 1 tablet by mouth once daily    dicyclomine (BENTYL) 10 MG capsule [Pharmacy Med Name: Dicyclomine HCl 10 MG Oral Capsule] 90 capsule 1     Sig: TAKE 1 CAPSULE BY MOUTH THREE TIMES DAILY AS NEEDED FOR CRAMPS    losartan (COZAAR) 25 MG tablet [Pharmacy Med Name: Losartan Potassium 25 MG Oral Tablet] 90 tablet 1     Sig: Take 1 tablet by mouth once daily      Last office visit with prescribing clinician: 11/8/2024   Last telemedicine visit with prescribing clinician: Visit date not found   Next office visit with prescribing clinician: 6/3/2025                         Would you like a call back once the refill request has been completed: [] Yes [] No    If the office needs to give you a call back, can they leave a voicemail: [] Yes [] No    Jose Alberto Avendaño MA  06/02/25, 10:23 EDT

## 2025-06-03 ENCOUNTER — OFFICE VISIT (OUTPATIENT)
Dept: FAMILY MEDICINE CLINIC | Facility: CLINIC | Age: 75
End: 2025-06-03
Payer: MEDICARE

## 2025-06-03 VITALS
SYSTOLIC BLOOD PRESSURE: 130 MMHG | HEART RATE: 77 BPM | BODY MASS INDEX: 32.18 KG/M2 | DIASTOLIC BLOOD PRESSURE: 76 MMHG | HEIGHT: 62 IN | WEIGHT: 174.9 LBS | OXYGEN SATURATION: 93 %

## 2025-06-03 DIAGNOSIS — K21.9 GASTROESOPHAGEAL REFLUX DISEASE WITHOUT ESOPHAGITIS: ICD-10-CM

## 2025-06-03 DIAGNOSIS — E03.9 ACQUIRED HYPOTHYROIDISM: ICD-10-CM

## 2025-06-03 DIAGNOSIS — N39.41 URGE INCONTINENCE OF URINE: ICD-10-CM

## 2025-06-03 DIAGNOSIS — E78.2 MIXED HYPERLIPIDEMIA: Primary | ICD-10-CM

## 2025-06-03 DIAGNOSIS — E11.65 TYPE 2 DIABETES MELLITUS WITH HYPERGLYCEMIA, WITHOUT LONG-TERM CURRENT USE OF INSULIN: ICD-10-CM

## 2025-06-03 DIAGNOSIS — D50.8 OTHER IRON DEFICIENCY ANEMIA: ICD-10-CM

## 2025-06-03 NOTE — PROGRESS NOTES
Chief Complaint  Follow-up (4 mo)    Patient or patient representative verbalized consent for the use of Ambient Listening during the visit with  Jesi Estes MD for chart documentation. 6/3/2025  15:40 EDT    Subjective        Pearl Christiansen presents to [facility]    History of Present Illness  The patient is here for her 4-month follow-up. She has hypercholesterolemia, hypothyroidism, type 2 diabetes without long-term insulin use, acid reflux, and iron deficiency anemia with an adverse effect to oral iron, therefore, she received IV iron therapy. She has fatty infiltration followed by gastroenterology. She sees Dr. Rhona Hammer for pain management due to back pain and left hip pain. She sees Zhane Crandall for bilateral carotid artery stenosis and Dr. Welch in vascular surgery due to a history of peripheral arterial disease. She sees Dr. Saleh due to iron deficiency anemia.    She reports experiencing sharp, transient right-sided abdominal pain lasting approximately 30 to 40 seconds, which can occur at rest and does not impede her daily activities. This is intermittent and has been off and on for many years.  Cause is uncertain despite doing CT scans and MRI's.  She also experiences bloating, which she does not attribute to Mounjaro. Her bowel movements are frequent, occurring at least three times in the morning and once after dinner, with stools typically being loose. This is her norm.  She has been under the care of Dr. Murrell for upper and lower endoscopies, during which polyps were identified and removed. She is scheduled for a follow-up in 5 years for repeat CN.    She has experienced bedwetting on three occasions over the past 5 to 6 months, typically when she wakes up needing to urinate and attempts to reach the bathroom but voids before she can get to restroom. She is unable to identify any specific triggers for these episodes. She consumes coffee in the morning and  water throughout the day.    She is currently undergoing further testing on her breast and is seeing Radha Akers, physician assistant with the breast general surgery group, because of her mammogram that was ordered by her gynecologist, Dr. Riley, which showed a stable mass in the left breast that was probably benign. They recommended a follow-up mammogram in 6 months, but it was suggested that she get an opinion from general surgery. She has also recommended 6 mo f/u imaging     She has been prescribed metformin extended release 500 mg once daily with meals, which has effectively managed her diarrhea. She is also on Mounjaro 2.5 mg, initiated 3 to 4 weeks ago, which she tolerates well with only occasional mild nausea. She expresses a desire to increase the dosage of Mounjaro. She reports feeling hungry despite consuming full meals.    She had labs a couple of days ago with negative hep C. Her TSH was normal, free T4 was low normal. Cholesterol shows triglycerides improved to 283, LDL 50, HDL slightly low at 38. CBC shows no more anemia, which has resolved. Platelets are normal. Differential is totally normal. CMP shows mild elevation in glucose at 128, normal renal function, normal kidneys, and normal liver. A1c improved from 7.5% to 7%. She peaked with an A1c in 04/2024 of 9.1%, so there has been a significant drop from 9.1% a year ago to 7% now.    PAST SURGICAL HISTORY:  She had a tubular adenoma in the transverse colon and a sessile serrated polyp in the ascending colon removed in late April 2024.    FAMILY HISTORY  Her brother had cirrhosis of the liver and liver cancer.    Hepatitis C Antibody (05/30/2025 08:17)  TSH+Free T4 (05/30/2025 08:17)  Lipid Panel With LDL / HDL Ratio (05/30/2025 08:17)  CBC & Differential (05/30/2025 08:17)  Comprehensive Metabolic Panel (05/30/2025 08:17)  Hemoglobin A1c (05/30/2025 08:17)  Objective   Vital Signs:  /76 (BP Location: Left arm, Patient Position: Sitting,  "Cuff Size: Adult)   Pulse 77   Ht 157.5 cm (62.01\")   Wt 79.3 kg (174 lb 14.4 oz)   SpO2 93%   BMI 31.98 kg/m²   Estimated body mass index is 31.98 kg/m² as calculated from the following:    Height as of this encounter: 157.5 cm (62.01\").    Weight as of this encounter: 79.3 kg (174 lb 14.4 oz).            Physical Exam  Vitals and nursing note reviewed.   Constitutional:       Appearance: Normal appearance. She is well-developed.   HENT:      Head: Normocephalic and atraumatic.      Right Ear: External ear normal.      Left Ear: External ear normal.   Eyes:      Extraocular Movements: Extraocular movements intact.      Conjunctiva/sclera: Conjunctivae normal.   Neck:      Vascular: No carotid bruit.   Cardiovascular:      Rate and Rhythm: Normal rate and regular rhythm.      Heart sounds: Normal heart sounds.      Comments: No bruits  Pulmonary:      Effort: Pulmonary effort is normal. No respiratory distress.      Breath sounds: Normal breath sounds. No stridor. No wheezing, rhonchi or rales.   Chest:      Chest wall: No tenderness.   Abdominal:      General: Bowel sounds are normal. There is no distension.      Palpations: Abdomen is soft. There is no mass.      Tenderness: There is no abdominal tenderness. There is no guarding or rebound.      Hernia: No hernia is present.   Musculoskeletal:      Cervical back: Neck supple.      Right lower leg: No edema.      Left lower leg: No edema.   Lymphadenopathy:      Cervical: No cervical adenopathy.   Skin:     General: Skin is warm.   Neurological:      General: No focal deficit present.      Mental Status: She is alert and oriented to person, place, and time. Mental status is at baseline.      Gait: Gait normal.   Psychiatric:         Mood and Affect: Mood normal.         Behavior: Behavior normal.         Thought Content: Thought content normal.         Judgment: Judgment normal.        Result Review :                   Assessment and Plan   Diagnoses and all " orders for this visit:    1. Mixed hyperlipidemia (Primary)    2. Acquired hypothyroidism    3. Type 2 diabetes mellitus with hyperglycemia, without long-term current use of insulin    4. Gastroesophageal reflux disease without esophagitis    5. Other iron deficiency anemia    6. Urge incontinence of urine  -     Cancel: UA / M With / Rflx Culture([facility] ONLY) - Urine, Clean Catch  -     UA / M With / Rflx Culture([facility] ONLY) - Urine, Clean Catch    Other orders  -     Tirzepatide 5 MG/0.5ML solution auto-injector; Inject 5 mg under the skin into the appropriate area as directed 1 (One) Time Per Week.  Dispense: 2 mL; Refill: 5             Assessment & Plan  1. Type 2 diabetes mellitus.  - A1c has improved from 7.5% to 7%, with a peak of 9.1% in 04/2024.  - Current medications include metformin extended release 500 mg once a day and Mounjaro 2.5 mg weekly  - Discussed increasing Mounjaro to 5 mg to further improve A1c and aid in weight loss.  - Advised to monitor diet closely, especially on cheat days, to avoid bloating.    2. Hypercholesterolemia.  - Triglycerides have improved to 283, LDL is 50, and HDL is slightly low at 38.  - Continued monitoring of lipid levels.  Exercise to raise HDL. Continue lipitor 40 mg qd and colestipol (also helps with loose stools)    3. Hypothyroidism.  - TSH is normal, and free T4 is low normal.  Continue levoxyl 50 mcg qd.    - Continued management of thyroid function.    4. Iron deficiency anemia.  - Anemia has resolved with IV iron treatment from Dr. Saleh.  - CBC shows no more anemia, platelets are normal, and differential is normal.    5. Gastroesophageal reflux disease (GERD).  - Continues to manage symptoms and continue nexium 40 mg qd.    - No new interventions discussed.    6. Fatty liver.  - Fatty infiltration of the liver noted on MRI in 12/2024.  - Continued monitoring of liver function, lose weight, exercise and hopefully mounjaro will help with weight loss  efforts    7. Peripheral arterial disease.  - Continues to see Dr. Welch for management.  Continue asa therapy  - No new interventions discussed.    8. Bilateral carotid artery stenosis.  - Continues to see Zhane Crandall for management.  Continue lipitor 40 mg qd and asa therapy.  - No new interventions discussed.    9. Left breast mass.  - Undergoing further testing and follow-up with DILCIA Cates, from the breast general surgery group.  - Follow-up mammogram recommended in 6 months.    10. Right-sided abdominal pain.  - Pain could be attributed to functional bowel discomfort or gas accumulation in the right side of the colon. It is reassuring that pain is short lived and is not daily and doesn't last but 30 seconds.  Also, no other associated sx and this has been longstanding with negative investigation  - Previous investigations including CT scan, MRI, and endoscopy have been conducted.  - Advised to monitor for triggers and manage diet to reduce symptoms.    11. Nocturnal enuresis.  - May be experiencing symptoms of an overactive bladder.  - Urinalysis will be conducted today to rule out any potential infection.  - Advised to limit intake of citrus fruits and diet drinks, ensure bladder is empty before bedtime, and perform Kegel exercises.  She prefers to wait on referral or pelvic floor therapy.          Follow Up   No follow-ups on file.  Patient was given instructions and counseling regarding her condition or for health maintenance advice. Please see specific information pulled into the AVS if appropriate.           Jesi Estes MD   22:34 EDT   [unfilled]       Answers submitted by the patient for this visit:  Problem not listed (Submitted on 5/28/2025)  Chief Complaint: Other medical problem  Reason for appointment: Upper pain in my side  abdominal pain: No  anorexia: No  joint pain: Yes  change in stool: No  chest pain: No  chills: No  nasal congestion: No  cough: No  diaphoresis: No  fatigue:  Yes  fever: No  headaches: No  joint swelling: No  myalgias: No  nausea: No  neck pain: Yes  numbness: Yes  rash: No  sore throat: No  swollen glands: No  dysuria: No  vertigo: No  visual change: No  vomiting: No  weakness: No  Onset: 6 to 12 months  Chronicity: recurrent  Frequency: intermittently  Medications tried: None  Additional information: None

## 2025-06-04 LAB
APPEARANCE UR: CLEAR
BACTERIA #/AREA URNS HPF: NORMAL /[HPF]
BILIRUB UR QL STRIP: NEGATIVE
CASTS URNS QL MICRO: NORMAL /LPF
COLOR UR: YELLOW
EPI CELLS #/AREA URNS HPF: NORMAL /HPF (ref 0–10)
GLUCOSE UR QL STRIP: NEGATIVE
HGB UR QL STRIP: NEGATIVE
KETONES UR QL STRIP: NEGATIVE
LEUKOCYTE ESTERASE UR QL STRIP: NEGATIVE
MICRO URNS: NORMAL
MICRO URNS: NORMAL
NITRITE UR QL STRIP: NEGATIVE
PH UR STRIP: 6 [PH] (ref 5–7.5)
PROT UR QL STRIP: NEGATIVE
RBC #/AREA URNS HPF: NORMAL /HPF (ref 0–2)
SP GR UR STRIP: 1.02 (ref 1–1.03)
URINALYSIS REFLEX: NORMAL
UROBILINOGEN UR STRIP-MCNC: 0.2 MG/DL (ref 0.2–1)
WBC #/AREA URNS HPF: NORMAL /HPF (ref 0–5)

## 2025-07-01 ENCOUNTER — LAB (OUTPATIENT)
Dept: OTHER | Facility: HOSPITAL | Age: 75
End: 2025-07-01
Payer: MEDICARE

## 2025-07-01 ENCOUNTER — OFFICE VISIT (OUTPATIENT)
Dept: ONCOLOGY | Facility: CLINIC | Age: 75
End: 2025-07-01
Payer: MEDICARE

## 2025-07-01 VITALS
HEIGHT: 62 IN | DIASTOLIC BLOOD PRESSURE: 80 MMHG | RESPIRATION RATE: 16 BRPM | HEART RATE: 79 BPM | TEMPERATURE: 98.7 F | SYSTOLIC BLOOD PRESSURE: 135 MMHG | OXYGEN SATURATION: 93 % | BODY MASS INDEX: 31.71 KG/M2 | WEIGHT: 172.3 LBS

## 2025-07-01 DIAGNOSIS — D50.8 IRON DEFICIENCY ANEMIA SECONDARY TO INADEQUATE DIETARY IRON INTAKE: Primary | ICD-10-CM

## 2025-07-01 DIAGNOSIS — D50.8 IRON DEFICIENCY ANEMIA SECONDARY TO INADEQUATE DIETARY IRON INTAKE: ICD-10-CM

## 2025-07-01 LAB
BASOPHILS # BLD AUTO: 0.08 10*3/MM3 (ref 0–0.2)
BASOPHILS NFR BLD AUTO: 1.1 % (ref 0–1.5)
DEPRECATED RDW RBC AUTO: 44.2 FL (ref 37–54)
EOSINOPHIL # BLD AUTO: 0.46 10*3/MM3 (ref 0–0.4)
EOSINOPHIL NFR BLD AUTO: 6.2 % (ref 0.3–6.2)
ERYTHROCYTE [DISTWIDTH] IN BLOOD BY AUTOMATED COUNT: 13.6 % (ref 12.3–15.4)
FERRITIN SERPL-MCNC: 379.1 NG/ML (ref 13–150)
HCT VFR BLD AUTO: 39.7 % (ref 34–46.6)
HGB BLD-MCNC: 12.9 G/DL (ref 12–15.9)
IMM GRANULOCYTES # BLD AUTO: 0.02 10*3/MM3 (ref 0–0.05)
IMM GRANULOCYTES NFR BLD AUTO: 0.3 % (ref 0–0.5)
IRON 24H UR-MRATE: 72 MCG/DL (ref 37–145)
IRON SATN MFR SERPL: 23 % (ref 20–50)
LYMPHOCYTES # BLD AUTO: 1.75 10*3/MM3 (ref 0.7–3.1)
LYMPHOCYTES NFR BLD AUTO: 23.6 % (ref 19.6–45.3)
MCH RBC QN AUTO: 29 PG (ref 26.6–33)
MCHC RBC AUTO-ENTMCNC: 32.5 G/DL (ref 31.5–35.7)
MCV RBC AUTO: 89.2 FL (ref 79–97)
MONOCYTES # BLD AUTO: 0.59 10*3/MM3 (ref 0.1–0.9)
MONOCYTES NFR BLD AUTO: 7.9 % (ref 5–12)
NEUTROPHILS NFR BLD AUTO: 4.53 10*3/MM3 (ref 1.7–7)
NEUTROPHILS NFR BLD AUTO: 60.9 % (ref 42.7–76)
NRBC BLD AUTO-RTO: 0 /100 WBC (ref 0–0.2)
PLATELET # BLD AUTO: 229 10*3/MM3 (ref 140–450)
PMV BLD AUTO: 10.7 FL (ref 6–12)
RBC # BLD AUTO: 4.45 10*6/MM3 (ref 3.77–5.28)
TIBC SERPL-MCNC: 314 MCG/DL (ref 298–536)
TRANSFERRIN SERPL-MCNC: 211 MG/DL (ref 200–360)
WBC NRBC COR # BLD AUTO: 7.43 10*3/MM3 (ref 3.4–10.8)

## 2025-07-01 PROCEDURE — 99213 OFFICE O/P EST LOW 20 MIN: CPT | Performed by: INTERNAL MEDICINE

## 2025-07-01 PROCEDURE — 85025 COMPLETE CBC W/AUTO DIFF WBC: CPT | Performed by: INTERNAL MEDICINE

## 2025-07-01 PROCEDURE — 84466 ASSAY OF TRANSFERRIN: CPT | Performed by: INTERNAL MEDICINE

## 2025-07-01 PROCEDURE — 1126F AMNT PAIN NOTED NONE PRSNT: CPT | Performed by: INTERNAL MEDICINE

## 2025-07-01 PROCEDURE — 82728 ASSAY OF FERRITIN: CPT | Performed by: INTERNAL MEDICINE

## 2025-07-01 PROCEDURE — 36415 COLL VENOUS BLD VENIPUNCTURE: CPT

## 2025-07-01 PROCEDURE — 83540 ASSAY OF IRON: CPT | Performed by: INTERNAL MEDICINE

## 2025-07-01 NOTE — Clinical Note
July 1, 2025     Jesi Estes MD  [address]  [address]  [facility] [address]    Patient: eParl Christiansen   YOB: 1950   Date of Visit: 7/1/2025     Dear Jesi Estes MD:       Thank you for referring Pearl Christiansen to me for evaluation. Below are the relevant portions of my assessment and plan of care.    If you have questions, please do not hesitate to call me. I look forward to following Pearl along with you.         Sincerely,        Anton Saleh MD        CC: No Recipients  Anton Saleh MD  07/01/25 1028  Sign when Signing Visit        REASON FOR FOLLOW-UP: Multifactoral anemia including anemia of chronic disease and iron deficiency.    History of Present Illness   The patient is 74-year-old female followed with below medical history and seen by subspecialists including GI 3/23/2023 for GE reflux, gastroparesis and IBS as well as previous cholecystectomy with studies at that point including H&H 11.1 and 34.4 with MCV of 80.0 and MCH 25.8.  Her studies 4/3 include a ferritin at 18.4 and iron of 51.  At this point she was followed by cardiology seen 4/27/2023 with a history of nonobstructive coronary artery disease, sleep apnea on CPAP and  peripheral vascular disease with left lower extremity claudication.  She had been admitted to [facility] for epigastric discomfort undergoing nuclear stress test that was significant for possible infarct but no ischemia.  She is thought to have symptoms from GI origin and was relatively stable with plans to repeat echocardiogram.  This was obtained 5/10/2023 with LV SF of 57.8%, normal left ventricular cavity size and wall thickness, left ventricular diastolic function with grade 1 impaired relaxation.    She had further assessment 5/1/2023 with H&H 10.7 and 33.9, MCHC 25.4.    Patient was seen by primary care 10/6/2023 with leg cramps that were worsening treated symptomatically with electrolyte supplementation, PAD had  improvement graded exercise with the patient unable to tolerate Pletal.  She was reviewed by orthopedics 10/12/2023 for her worsening foot pain and thought possibly to have an occult fracture or stress fracture.  Plain films demonstrated degenerative changes with calcaneal spurs but no acute fracture.    Repeat studies 10/20/2023 with H&H of 10.8 and 33.9, MCH of 25.5, MCV 80.1, platelet count 247,000, free T41.01, TSH 1.680.  Her further cardiac exams were unremarkable 11/2/2023 of the patient continued to have fatigue and had respiratory issues treated by primary care 11/8/2023 with Mucinex and Flonase, subsequent reevaluation 11/13 by immediate care treated with prednisone and Zithromax.    We are asked to see the patient for her anemia and she is seen in office 11/28/2023.  We discussed her findings today in particular her variable anemia which is improved in office 11/28/2023.      After discussion she will be sent back to laboratory obtaining CMP, EPO level, MATHEUS, PE, free serum light chains, MMA, sed rate, B12 level, MATHEUS, PE, free serum light chains.  She had been in her primary care's office today undergoing TSH, free T4, hemoglobin A1c, ferritin, iron profile as well.    Her studies included a CMP with blood glucose of 163, otherwise normal, total cholesterol 149, triglycerides 341, HDL 39, ferritin of 31.9, iron profile at 50% saturation, hemoglobin A1c of 8.0, TSH 0.677, free T41.15, B12 of 551, MMA of 114, IRF of 19.8, reticulocyte percentage 1.48, reticulocyte hemoglobin 31.2, paraprotein analysis with no monoclonal spike, free light chain kappa light 22.7, free lambda light chain 19.1 and kappa/lambda ratio of 1.19, EPO level 24.3, ESR 10, negative NEW comprehensive profile.  Please note GI workup including previous colonoscopy.    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  She is reassessed in July  now with evidence of iron deficiency and a trial of oral iron was not tolerable.  Subsequent efforts to obtain more tolerable oral iron with Accrufer though this was not available until the last several days when the patient is seen 10/15/2024.      She was asked to return with studies 10/8/2024 H&H of 10.5 and 34.4, MCV of 81.9 and MCH of 25.0, ferritin of 15.3 and iron profile with 11% saturation.At this point the patient is better treated with IV iron.    The patient went on to be treated 10/15 and 10/22/2024 for total dose of 1020 mg Feraheme.  She is next seen  with studies  including 11% iron saturation, ferritin now up to 16, soluble transferrin receptor 18.9 and CBC with H&H of 0.9 and 37.6 with white count 10,003 and 20 and platelet count of 185,000.  Unfortunately she remains still quite weak and fatigued despite her hemoglobin improving and we have just turned and that she would be further treated with additional IV Feraheme today x 1.  She is also having right upper quadrant pain which led to an MRI of the abdomen 2024 that, again, demonstrates extensive fatty filtration of liver, several hepatic cysts, no suspicious liver lesions nor intrahepatic or  extrahepatic biliary dilatation.  Additionally splenic size is normal is no lymphadenopathy no abnormality pancreas or adrenals.  Considering continued anemia and right upper quadrant pain follow-up with GI consultation be requested and likely colonoscopy.    The patient is seen back 2025 having recently started Mounjaro.  This was held as she cared for her brother who, unfortunately,  of liver cancer.  She herself had been seen by GI medicine continuing assessment for apparent fatty infiltration of the liver and is now scheduled for endoscopy the latter part of 2025.  Her follow-up iron studies include a ferritin of 423.9, iron profile with iron 68, saturation 24%, TIBC mildly reduced to 285.  Soluble transferrin receptor 17.1.   She is no longer iron deficient but is still having right upper quadrant pain.    Patient is next in 7/1/2025.  Follow-up CBC includes an H&H of 12.9 and 39.7 additional issues since last seen include her endoscopy which polyps identified and removed.  She has follow-up in 5 years.  Additional issues including breast imaging with a stable mass in the left breast thought to be benign and a follow-up mammogram in 6 months.  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    Past Medical History:   Diagnosis Date    Allergic     Penicillin    Anemia     Anxiety     Arthritis     Arthritis of back     Arthritis of neck     CAD (coronary artery disease)     50% mid LAD by cath on 8/14/2017    Cataract     Chronic diarrhea     Chronic pain disorder     Colon polyp 2012 ?    Depression     Diabetes mellitus     Diverticulosis     Fatty liver 2018    GERD (gastroesophageal reflux disease)     H/O Anal fissure     Headache     Headache, tension-type     Hip arthrosis     Hyperlipidemia     Hypertension     Hypothyroidism     Irritable bowel syndrome     Knee swelling     Low back pain     Low back strain     Neck pain 04/10/2025    Neuropathy in diabetes     Obesity     ALEXANDER (obstructive sleep apnea)     PAD (peripheral artery disease)     Stress fracture         Past Surgical History:   Procedure Laterality Date    ADENOIDECTOMY      CARDIAC CATHETERIZATION Left 08/14/2017    [facility]    CHOLECYSTECTOMY      COLONOSCOPY  2015    Diverticulosis, hemorrhoids    COLONOSCOPY W/ POLYPECTOMY  2020    ENDOSCOPY  2016    FOOT SURGERY      FRACTURE SURGERY      Stress fracture about 4 yrs ago    GALLBLADDER SURGERY  1991    HAND SURGERY      RECTOVAGINAL FISTULA REPAIR      SACROILIAC JOINT INJECTION Left 01/31/2024    Procedure: SACROILIAC JOINT INJECTION LEFT 11392;  Surgeon: Rhona Hammer MD;  [place] [facility] [place]  Service: Pain Management;   Laterality: Left;    STEROID INJECTION HIP Left 11/21/2022    Procedure: HIP INJECTION UNDER FLUORO - left;  Surgeon: Rhona Hammer MD;  Location: [facility];  Service: Pain Management;  Laterality: Left;    STEROID INJECTION HIP Left 3/24/2025    Procedure: HIP INJECTION UNDER FLUORO - left;  Surgeon: Rhona Hammer MD;  Location: [facility];  Service: Pain Management;  Laterality: Left;    TONSILLECTOMY      TRIGGER FINGER RELEASE      TRIGGER POINT INJECTION      UPPER GASTROINTESTINAL ENDOSCOPY      VULVA BIOPSY      11/2021 Colonoscopy by Dr Hurley- rectal polyp    Current Outpatient Medications on File Prior to Visit   Medication Sig Dispense Refill    Acetaminophen (TYLENOL PO) Take 650 mg by mouth 2 (Two) Times a Day As Needed.      amLODIPine (NORVASC) 5 MG tablet Take 1 tablet by mouth once daily 90 tablet 1    aspirin 81 MG EC tablet Take 1 tablet by mouth Daily.      atorvastatin (LIPITOR) 40 MG tablet Take 1 tablet by mouth Every Night. 90 tablet 0    carvedilol (COREG) 3.125 MG tablet Take 1 tablet by mouth twice daily 180 tablet 1    cetirizine (zyrTEC) 5 MG tablet Take 1 tablet by mouth Daily.      clobetasol (TEMOVATE) 0.05 % cream Apply 1 application topically to the appropriate area as directed every night at bedtime. 45 g 0    colestipol (COLESTID) 1 g tablet Take 1 tablet by mouth 2 (Two) Times a Day. 180 tablet 1    Diclofenac Sodium (VOLTAREN) 1 % gel gel Apply 4 g topically to the appropriate area as directed 4 (Four) Times a Day.      dicyclomine (BENTYL) 10 MG capsule TAKE 1 CAPSULE BY MOUTH THREE TIMES DAILY AS NEEDED FOR CRAMPS 90 capsule 1    escitalopram (LEXAPRO) 20 MG tablet Take 1 tablet by mouth once daily 90 tablet 1    esomeprazole (nexIUM) 40 MG capsule TAKE 1 CAPSULE BY MOUTH ONCE DAILY IN THE MORNING BEFORE BREAKFAST 90 capsule 0    estradiol (ESTRACE) 0.1 MG/GM vaginal cream       famotidine (PEPCID) 40 MG tablet Take 1 tablet by mouth At Night As Needed for  Heartburn. 30 tablet 5    gabapentin (NEURONTIN) 300 MG capsule TAKE 1 CAPSULE BY MOUTH THREE TIMES DAILY 270 capsule 2    levothyroxine (SYNTHROID, LEVOTHROID) 50 MCG tablet Take 1 tablet by mouth once daily 90 tablet 0    losartan (COZAAR) 25 MG tablet Take 1 tablet by mouth once daily 90 tablet 1    metFORMIN ER (GLUCOPHAGE-XR) 500 MG 24 hr tablet Take 2 tablets daily with a meal 180 tablet 1    methocarbamol (ROBAXIN) 500 MG tablet Take 1 tablet by mouth 4 (Four) Times a Day.      multivitamin with minerals tablet tablet Take 1 tablet by mouth Daily.      nystatin (MYCOSTATIN) 260471 UNIT/GM ointment Apply 1 application topically to the appropriate area as directed 2 (Two) Times a Day. 30 g 0    nystatin-triamcinolone (MYCOLOG) 598776-2.1 UNIT/GM-% ointment APPLY ONE OINTMENT TWO TIMES DAILY      Tirzepatide 5 MG/0.5ML solution auto-injector Inject 5 mg under the skin into the appropriate area as directed 1 (One) Time Per Week. 2 mL 5    tiZANidine (ZANAFLEX) 2 MG tablet Take 1 or 2 tablets PO QHS tolerated 60 tablet 1     No current facility-administered medications on file prior to visit.        ALLERGIES:    Allergies   Allergen Reactions    Lidocaine Other (See Comments)     Does not work on pt    Keflex [Cephalexin] Rash    Penicillins Rash        Social History     Socioeconomic History    Marital status:      Spouse name: Haresh    Number of children: 2   Tobacco Use    Smoking status: Former     Current packs/day: 0.00     Average packs/day: 1 pack/day for 30.0 years (30.0 ttl pk-yrs)     Types: Cigarettes     Start date: 1965     Quit date: 1991     Years since quittin.0     Passive exposure: Past    Smokeless tobacco: Never    Tobacco comments:     quit in her 40's   Vaping Use    Vaping status: Never Used   Substance and Sexual Activity    Alcohol use: Not Currently     Comment: 1 drink every blue moon    Drug use: Never    Sexual activity: Not Currently     Partners: Male     Birth  "control/protection: None        Family History   Problem Relation Age of Onset    Heart disease Mother     Hypertension Mother     COPD Mother     Alcohol abuse Brother         Alcoholic his adult life    Alcohol abuse Brother     Cirrhosis Brother     Liver cancer Brother     Colon cancer Neg Hx     Colon polyps Neg Hx     Crohn's disease Neg Hx     Irritable bowel syndrome Neg Hx     Ulcerative colitis Neg Hx         Review of Systems   Constitutional:  Positive for diaphoresis and fatigue. Negative for fever and unexpected weight change.   HENT:  Mouth sores: Dry mouth.    Eyes: Negative.    Respiratory:  Negative for cough and shortness of breath.         Recent URI   Cardiovascular:  Negative for palpitations (Recent holter).   Gastrointestinal:         Fatty food intolrance   Endocrine: Negative.    Genitourinary:  Positive for frequency.   Musculoskeletal:  Positive for myalgias (night cramps).   Neurological: Negative.    Hematological: Negative.    Psychiatric/Behavioral: Negative.          Objective    Vitals:    07/01/25 1003   BP: 135/80   Pulse: 79   Resp: 16   Temp: 98.7 °F (37.1 °C)   TempSrc: Infrared   SpO2: 93%   Weight: 78.2 kg (172 lb 4.8 oz)   Height: 157.5 cm (62.01\")   PainSc: 0-No pain                 7/1/2025    10:06 AM   Current Status   ECOG score 0       Physical Exam  Constitutional:       Appearance: She is obese.   HENT:      Head: Normocephalic and atraumatic.      Nose: Nose normal.      Mouth/Throat:      Mouth: Mucous membranes are moist.      Pharynx: Oropharynx is clear.   Eyes:      Extraocular Movements: Extraocular movements intact.      Conjunctiva/sclera: Conjunctivae normal.      Pupils: Pupils are equal, round, and reactive to light.   Cardiovascular:      Rate and Rhythm: Normal rate and regular rhythm.      Pulses: Normal pulses.      Heart sounds: Normal heart sounds.   Pulmonary:      Effort: Pulmonary effort is normal.      Breath sounds: Normal breath sounds. "   Abdominal:      General: Bowel sounds are normal.      Palpations: Abdomen is soft.   Musculoskeletal:         General: Normal range of motion.      Cervical back: Normal range of motion and neck supple.   Skin:     General: Skin is warm and dry.   Neurological:      General: No focal deficit present.      Mental Status: She is oriented to person, place, and time.   Psychiatric:         Mood and Affect: Mood normal.         Behavior: Behavior normal.           RECENT LABS:  Hematology WBC   Date Value Ref Range Status   07/01/2025 7.43 3.40 - 10.80 10*3/mm3 Final   05/30/2025 6.89 3.40 - 10.80 10*3/mm3 Final   05/01/2023 7.49 4.5 - 11.0 10*3/uL Final     RBC   Date Value Ref Range Status   07/01/2025 4.45 3.77 - 5.28 10*6/mm3 Final   05/30/2025 4.23 3.77 - 5.28 10*6/mm3 Final   05/01/2023 4.22 4.0 - 5.2 10*6/uL Final     Hemoglobin   Date Value Ref Range Status   07/01/2025 12.9 12.0 - 15.9 g/dL Final   05/01/2023 10.7 (L) 12.0 - 16.0 g/dL Final     Hematocrit   Date Value Ref Range Status   07/01/2025 39.7 34.0 - 46.6 % Final   05/01/2023 33.9 (L) 36.0 - 46.0 % Final     Platelets   Date Value Ref Range Status   07/01/2025 229 140 - 450 10*3/mm3 Final   05/01/2023 233 140 - 440 10*3/uL Final          Assessment & Plan    74-year-old female with a history including GE reflux, gastroparesis, IBS, previous cholecystectomy, nonobstructive coronary artery disease, peripheral vascular disease, worsening leg cramps found to have variable anemia over the last several visits.  On occasion this has been thought to be iron deficient and she has been treated with oral iron on previous occasions.  Peripheral smear in office today reveals normocytic normochromic RBCs with mild polychromatophilia, microcytosis and hypochromia.  Leukocytes appear normal per number differential, platelets appear normal per number and size.    The patient's anemia is not progressive, and is suspected to be related to her chronic illnesses, but we  do need to assess to try to determine whether we could improve this for her.    After discussion she will be sent back to laboratory obtaining CMP, EPO level, MATHEUS, PE, free serum light chains, MMA, sed rate, B12 level, MATHESU, PE, free serum light chains.  She had been in her primary care's office today undergoing TSH, free T4, hemoglobin A1c, ferritin, iron profile as well.    Her studies included a CMP with blood glucose of 163, otherwise normal, total cholesterol 149, triglycerides 341, HDL 39, ferritin of 31.9, iron profile at 50% saturation, hemoglobin A1c of 8.0, TSH 0.677, free T41.15, B12 of 551, MMA of 114, IRF of 19.8, reticulocyte percentage 1.48, reticulocyte hemoglobin 31.2, paraprotein analysis with no monoclonal spike, free light chain kappa light 22.7, free lambda light chain 19.1 and kappa/lambda ratio of 1.19, EPO level 24.3, ESR 10, negative NEW comprehensive profile.    The patient is seen 1/9/2024 with modest improvement in her hemoglobin hematocrit no significant abnormalities on additional testing.  There is likely a component of anemia of chronic disease present no additional intervention is not yet felt necessary.    She was reassessed in July now with evidence of iron deficiency and a trial of oral iron was not tolerable.  Subsequent efforts to obtain more tolerable oral iron with Accrufer though this was not available until the last several days when the patient is seen 10/15/2024.      She was asked to return with studies 10/8/2024 H&H of 10.5 and 34.4, MCV of 81.9 and MCH of 25.0, ferritin of 15.3 and iron profile with 11% saturation.At this point the patient is better treated with IV iron.    The patient went on to be treated 10/15 and 10/22/2024 for total dose of 1020 mg Feraheme.    She is next seen 1/6 with studies 12/21 including 11% iron saturation, ferritin now up to 16, soluble transferrin receptor 18.9 and CBC with H&H of 0.9 and 37.6 with white count 10,003 and 20 and platelet  count of 185,000.  Unfortunately she remains still quite weak and fatigued despite her hemoglobin improving and we have just turned and that she would be further treated with additional IV Feraheme today x 1.  She is also having right upper quadrant pain which led to an MRI of the abdomen 2024 that, again, demonstrates extensive fatty filtration of liver, several hepatic cysts, no suspicious liver lesions nor intrahepatic or  extrahepatic biliary dilatation.  Additionally splenic size is normal is no lymphadenopathy no abnormality pancreas or adrenals.  Considering continued anemia and right upper quadrant pain follow-up with GI consultation be requested and likely colonoscopy.    The patient went on to be treated with IV iron 2025-Feraheme 510 mg x 1.    The patient is seen back 2025 having recently started Mounjaro.  This was held as she cared for her brother who, unfortunately,  of liver cancer.  She herself had been seen by GI medicine continuing assessment for apparent fatty infiltration of the liver and is now scheduled for endoscopy the latter part of 2025.  Her follow-up iron studies include a ferritin of 423.9, iron profile with iron 68, saturation 24%, TIBC mildly reduced to 285.  Soluble transferrin receptor 17.1.  She is no longer iron deficient but is still having right upper quadrant pain.  She is encouraged to undergo the endoscopy as requested by GI medicine.    Patient is next in 2025.  Follow-up CBC includes an H&H of 12.9 and 39.7 additional issues since last seen include her endoscopy which polyps identified and removed.  She has follow-up in 5 years.  Additional issues including breast imaging with a stable mass in the left breast thought to be benign and a follow-up mammogram in 6 months.  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    Plan:  *Hold additional IV Feraheme at this point  *24  weeks-CBC, ferritin, iron profile, NP assessment

## 2025-07-01 NOTE — PROGRESS NOTES
REASON FOR FOLLOW-UP: Multifactoral anemia including anemia of chronic disease and iron deficiency.    History of Present Illness   The patient is 74-year-old female followed with below medical history and seen by subspecialists including GI 3/23/2023 for GE reflux, gastroparesis and IBS as well as previous cholecystectomy with studies at that point including H&H 11.1 and 34.4 with MCV of 80.0 and MCH 25.8.  Her studies 4/3 include a ferritin at 18.4 and iron of 51.  At this point she was followed by cardiology seen 4/27/2023 with a history of nonobstructive coronary artery disease, sleep apnea on CPAP and  peripheral vascular disease with left lower extremity claudication.  She had been admitted to [facility] for epigastric discomfort undergoing nuclear stress test that was significant for possible infarct but no ischemia.  She is thought to have symptoms from GI origin and was relatively stable with plans to repeat echocardiogram.  This was obtained 5/10/2023 with LV SF of 57.8%, normal left ventricular cavity size and wall thickness, left ventricular diastolic function with grade 1 impaired relaxation.    She had further assessment 5/1/2023 with H&H 10.7 and 33.9, MCHC 25.4.    Patient was seen by primary care 10/6/2023 with leg cramps that were worsening treated symptomatically with electrolyte supplementation, PAD had improvement graded exercise with the patient unable to tolerate Pletal.  She was reviewed by orthopedics 10/12/2023 for her worsening foot pain and thought possibly to have an occult fracture or stress fracture.  Plain films demonstrated degenerative changes with calcaneal spurs but no acute fracture.    Repeat studies 10/20/2023 with H&H of 10.8 and 33.9, MCH of 25.5, MCV 80.1, platelet count 247,000, free T41.01, TSH 1.680.  Her further cardiac exams were unremarkable 11/2/2023 of the patient continued to have fatigue and had respiratory issues treated by primary care 11/8/2023  with Mucinex and Flonase, subsequent reevaluation 11/13 by immediate care treated with prednisone and Zithromax.    We are asked to see the patient for her anemia and she is seen in office 11/28/2023.  We discussed her findings today in particular her variable anemia which is improved in office 11/28/2023.      After discussion she will be sent back to laboratory obtaining CMP, EPO level, MATHEUS, PE, free serum light chains, MMA, sed rate, B12 level, MATHEUS, PE, free serum light chains.  She had been in her primary care's office today undergoing TSH, free T4, hemoglobin A1c, ferritin, iron profile as well.    Her studies included a CMP with blood glucose of 163, otherwise normal, total cholesterol 149, triglycerides 341, HDL 39, ferritin of 31.9, iron profile at 50% saturation, hemoglobin A1c of 8.0, TSH 0.677, free T41.15, B12 of 551, MMA of 114, IRF of 19.8, reticulocyte percentage 1.48, reticulocyte hemoglobin 31.2, paraprotein analysis with no monoclonal spike, free light chain kappa light 22.7, free lambda light chain 19.1 and kappa/lambda ratio of 1.19, EPO level 24.3, ESR 10, negative NEW comprehensive profile.  Please note GI workup including previous colonoscopy.    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  She is reassessed in July now with evidence of iron deficiency and a trial of oral iron was not tolerable.  Subsequent efforts to obtain more tolerable oral iron with Accrufer though this was not available until the last several days when the patient is seen 10/15/2024.      She was asked to return with studies 10/8/2024 H&H of 10.5 and 34.4, MCV of 81.9 and MCH of 25.0, ferritin of 15.3 and iron profile with 11% saturation.At this point the patient is better treated with IV iron.    The patient went on to be treated 10/15 and 10/22/2024 for total dose of 1020 mg Feraheme.  She is next seen 1/6 with  studies  including 11% iron saturation, ferritin now up to 16, soluble transferrin receptor 18.9 and CBC with H&H of 0.9 and 37.6 with white count 10,003 and 20 and platelet count of 185,000.  Unfortunately she remains still quite weak and fatigued despite her hemoglobin improving and we have just turned and that she would be further treated with additional IV Feraheme today x 1.  She is also having right upper quadrant pain which led to an MRI of the abdomen 2024 that, again, demonstrates extensive fatty filtration of liver, several hepatic cysts, no suspicious liver lesions nor intrahepatic or  extrahepatic biliary dilatation.  Additionally splenic size is normal is no lymphadenopathy no abnormality pancreas or adrenals.  Considering continued anemia and right upper quadrant pain follow-up with GI consultation be requested and likely colonoscopy.    The patient is seen back 2025 having recently started Mounjaro.  This was held as she cared for her brother who, unfortunately,  of liver cancer.  She herself had been seen by GI medicine continuing assessment for apparent fatty infiltration of the liver and is now scheduled for endoscopy the latter part of 2025.  Her follow-up iron studies include a ferritin of 423.9, iron profile with iron 68, saturation 24%, TIBC mildly reduced to 285.  Soluble transferrin receptor 17.1.  She is no longer iron deficient but is still having right upper quadrant pain.    Patient is next in 2025.  Follow-up CBC includes an H&H of 12.9 and 39.7 additional issues since last seen include her endoscopy which polyps identified and removed.  She has follow-up in 5 years.  Additional issues including breast imaging with a stable mass in the left breast thought to be benign and a follow-up mammogram in 6 months.  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    Past Medical History:    Diagnosis Date    Allergic     Penicillin    Anemia     Anxiety     Arthritis     Arthritis of back     Arthritis of neck     CAD (coronary artery disease)     50% mid LAD by cath on 8/14/2017    Cataract     Chronic diarrhea     Chronic pain disorder     Colon polyp 2012 ?    Depression     Diabetes mellitus     Diverticulosis     Fatty liver 2018    GERD (gastroesophageal reflux disease)     H/O Anal fissure     Headache     Headache, tension-type     Hip arthrosis     Hyperlipidemia     Hypertension     Hypothyroidism     Irritable bowel syndrome     Knee swelling     Low back pain     Low back strain     Neck pain 04/10/2025    Neuropathy in diabetes     Obesity     ALEXANDER (obstructive sleep apnea)     PAD (peripheral artery disease)     Stress fracture         Past Surgical History:   Procedure Laterality Date    ADENOIDECTOMY      CARDIAC CATHETERIZATION Left 08/14/2017    [facility]    CHOLECYSTECTOMY      COLONOSCOPY  2015    Diverticulosis, hemorrhoids    COLONOSCOPY W/ POLYPECTOMY  2020    ENDOSCOPY  2016    FOOT SURGERY      FRACTURE SURGERY      Stress fracture about 4 yrs ago    GALLBLADDER SURGERY  1991    HAND SURGERY      RECTOVAGINAL FISTULA REPAIR      SACROILIAC JOINT INJECTION Left 01/31/2024    Procedure: SACROILIAC JOINT INJECTION LEFT 02879;  Surgeon: Rhona Hammer MD;  Location: [facility];  Service: Pain Management;  Laterality: Left;    STEROID INJECTION HIP Left 11/21/2022    Procedure: HIP INJECTION UNDER FLUORO - left;  Surgeon: Rhona Hammer MD;  Location: [facility];  Service: Pain Management;  Laterality: Left;    STEROID INJECTION HIP Left 3/24/2025    Procedure: HIP INJECTION UNDER FLUORO - left;  Surgeon: Rhona Hammer MD;  Location: [facility];  Service: Pain Management;  Laterality: Left;    TONSILLECTOMY      TRIGGER FINGER RELEASE      TRIGGER POINT INJECTION      UPPER GASTROINTESTINAL ENDOSCOPY      VULVA BIOPSY      11/2021 Colonoscopy by Dr Hurley- rectal  polyp    Current Outpatient Medications on File Prior to Visit   Medication Sig Dispense Refill    Acetaminophen (TYLENOL PO) Take 650 mg by mouth 2 (Two) Times a Day As Needed.      amLODIPine (NORVASC) 5 MG tablet Take 1 tablet by mouth once daily 90 tablet 1    aspirin 81 MG EC tablet Take 1 tablet by mouth Daily.      atorvastatin (LIPITOR) 40 MG tablet Take 1 tablet by mouth Every Night. 90 tablet 0    carvedilol (COREG) 3.125 MG tablet Take 1 tablet by mouth twice daily 180 tablet 1    cetirizine (zyrTEC) 5 MG tablet Take 1 tablet by mouth Daily.      clobetasol (TEMOVATE) 0.05 % cream Apply 1 application topically to the appropriate area as directed every night at bedtime. 45 g 0    colestipol (COLESTID) 1 g tablet Take 1 tablet by mouth 2 (Two) Times a Day. 180 tablet 1    Diclofenac Sodium (VOLTAREN) 1 % gel gel Apply 4 g topically to the appropriate area as directed 4 (Four) Times a Day.      dicyclomine (BENTYL) 10 MG capsule TAKE 1 CAPSULE BY MOUTH THREE TIMES DAILY AS NEEDED FOR CRAMPS 90 capsule 1    escitalopram (LEXAPRO) 20 MG tablet Take 1 tablet by mouth once daily 90 tablet 1    esomeprazole (nexIUM) 40 MG capsule TAKE 1 CAPSULE BY MOUTH ONCE DAILY IN THE MORNING BEFORE BREAKFAST 90 capsule 0    estradiol (ESTRACE) 0.1 MG/GM vaginal cream       famotidine (PEPCID) 40 MG tablet Take 1 tablet by mouth At Night As Needed for Heartburn. 30 tablet 5    gabapentin (NEURONTIN) 300 MG capsule TAKE 1 CAPSULE BY MOUTH THREE TIMES DAILY 270 capsule 2    levothyroxine (SYNTHROID, LEVOTHROID) 50 MCG tablet Take 1 tablet by mouth once daily 90 tablet 0    losartan (COZAAR) 25 MG tablet Take 1 tablet by mouth once daily 90 tablet 1    metFORMIN ER (GLUCOPHAGE-XR) 500 MG 24 hr tablet Take 2 tablets daily with a meal 180 tablet 1    methocarbamol (ROBAXIN) 500 MG tablet Take 1 tablet by mouth 4 (Four) Times a Day.      multivitamin with minerals tablet tablet Take 1 tablet by mouth Daily.      nystatin  (MYCOSTATIN) 964516 UNIT/GM ointment Apply 1 application topically to the appropriate area as directed 2 (Two) Times a Day. 30 g 0    nystatin-triamcinolone (MYCOLOG) 510363-9.1 UNIT/GM-% ointment APPLY ONE OINTMENT TWO TIMES DAILY      Tirzepatide 5 MG/0.5ML solution auto-injector Inject 5 mg under the skin into the appropriate area as directed 1 (One) Time Per Week. 2 mL 5    tiZANidine (ZANAFLEX) 2 MG tablet Take 1 or 2 tablets PO QHS tolerated 60 tablet 1     No current facility-administered medications on file prior to visit.        ALLERGIES:    Allergies   Allergen Reactions    Lidocaine Other (See Comments)     Does not work on pt    Keflex [Cephalexin] Rash    Penicillins Rash        Social History     Socioeconomic History    Marital status:      Spouse name: Haresh    Number of children: 2   Tobacco Use    Smoking status: Former     Current packs/day: 0.00     Average packs/day: 1 pack/day for 30.0 years (30.0 ttl pk-yrs)     Types: Cigarettes     Start date: 1965     Quit date: 1991     Years since quittin.0     Passive exposure: Past    Smokeless tobacco: Never    Tobacco comments:     quit in her 40's   Vaping Use    Vaping status: Never Used   Substance and Sexual Activity    Alcohol use: Not Currently     Comment: 1 drink every blue moon    Drug use: Never    Sexual activity: Not Currently     Partners: Male     Birth control/protection: None        Family History   Problem Relation Age of Onset    Heart disease Mother     Hypertension Mother     COPD Mother     Alcohol abuse Brother         Alcoholic his adult life    Alcohol abuse Brother     Cirrhosis Brother     Liver cancer Brother     Colon cancer Neg Hx     Colon polyps Neg Hx     Crohn's disease Neg Hx     Irritable bowel syndrome Neg Hx     Ulcerative colitis Neg Hx         Review of Systems   Constitutional:  Positive for diaphoresis and fatigue. Negative for fever and unexpected weight change.   HENT:  Mouth sores: Dry  "mouth.    Eyes: Negative.    Respiratory:  Negative for cough and shortness of breath.         Recent URI   Cardiovascular:  Negative for palpitations (Recent holter).   Gastrointestinal:         Fatty food intolrance   Endocrine: Negative.    Genitourinary:  Positive for frequency.   Musculoskeletal:  Positive for myalgias (night cramps).   Neurological: Negative.    Hematological: Negative.    Psychiatric/Behavioral: Negative.          Objective     Vitals:    07/01/25 1003   BP: 135/80   Pulse: 79   Resp: 16   Temp: 98.7 °F (37.1 °C)   TempSrc: Infrared   SpO2: 93%   Weight: 78.2 kg (172 lb 4.8 oz)   Height: 157.5 cm (62.01\")   PainSc: 0-No pain                 7/1/2025    10:06 AM   Current Status   ECOG score 0       Physical Exam  Constitutional:       Appearance: She is obese.   HENT:      Head: Normocephalic and atraumatic.      Nose: Nose normal.      Mouth/Throat:      Mouth: Mucous membranes are moist.      Pharynx: Oropharynx is clear.   Eyes:      Extraocular Movements: Extraocular movements intact.      Conjunctiva/sclera: Conjunctivae normal.      Pupils: Pupils are equal, round, and reactive to light.   Cardiovascular:      Rate and Rhythm: Normal rate and regular rhythm.      Pulses: Normal pulses.      Heart sounds: Normal heart sounds.   Pulmonary:      Effort: Pulmonary effort is normal.      Breath sounds: Normal breath sounds.   Abdominal:      General: Bowel sounds are normal.      Palpations: Abdomen is soft.   Musculoskeletal:         General: Normal range of motion.      Cervical back: Normal range of motion and neck supple.   Skin:     General: Skin is warm and dry.   Neurological:      General: No focal deficit present.      Mental Status: She is oriented to person, place, and time.   Psychiatric:         Mood and Affect: Mood normal.         Behavior: Behavior normal.           RECENT LABS:  Hematology WBC   Date Value Ref Range Status   07/01/2025 7.43 3.40 - 10.80 10*3/mm3 Final "   05/30/2025 6.89 3.40 - 10.80 10*3/mm3 Final   05/01/2023 7.49 4.5 - 11.0 10*3/uL Final     RBC   Date Value Ref Range Status   07/01/2025 4.45 3.77 - 5.28 10*6/mm3 Final   05/30/2025 4.23 3.77 - 5.28 10*6/mm3 Final   05/01/2023 4.22 4.0 - 5.2 10*6/uL Final     Hemoglobin   Date Value Ref Range Status   07/01/2025 12.9 12.0 - 15.9 g/dL Final   05/01/2023 10.7 (L) 12.0 - 16.0 g/dL Final     Hematocrit   Date Value Ref Range Status   07/01/2025 39.7 34.0 - 46.6 % Final   05/01/2023 33.9 (L) 36.0 - 46.0 % Final     Platelets   Date Value Ref Range Status   07/01/2025 229 140 - 450 10*3/mm3 Final   05/01/2023 233 140 - 440 10*3/uL Final          Assessment & Plan     74-year-old female with a history including GE reflux, gastroparesis, IBS, previous cholecystectomy, nonobstructive coronary artery disease, peripheral vascular disease, worsening leg cramps found to have variable anemia over the last several visits.  On occasion this has been thought to be iron deficient and she has been treated with oral iron on previous occasions.  Peripheral smear in office today reveals normocytic normochromic RBCs with mild polychromatophilia, microcytosis and hypochromia.  Leukocytes appear normal per number differential, platelets appear normal per number and size.    The patient's anemia is not progressive, and is suspected to be related to her chronic illnesses, but we do need to assess to try to determine whether we could improve this for her.    After discussion she will be sent back to laboratory obtaining CMP, EPO level, MATHEUS, PE, free serum light chains, MMA, sed rate, B12 level, MATHEUS, PE, free serum light chains.  She had been in her primary care's office today undergoing TSH, free T4, hemoglobin A1c, ferritin, iron profile as well.    Her studies included a CMP with blood glucose of 163, otherwise normal, total cholesterol 149, triglycerides 341, HDL 39, ferritin of 31.9, iron profile at 50% saturation, hemoglobin A1c of  8.0, TSH 0.677, free T41.15, B12 of 551, MMA of 114, IRF of 19.8, reticulocyte percentage 1.48, reticulocyte hemoglobin 31.2, paraprotein analysis with no monoclonal spike, free light chain kappa light 22.7, free lambda light chain 19.1 and kappa/lambda ratio of 1.19, EPO level 24.3, ESR 10, negative NEW comprehensive profile.    The patient is seen 1/9/2024 with modest improvement in her hemoglobin hematocrit no significant abnormalities on additional testing.  There is likely a component of anemia of chronic disease present no additional intervention is not yet felt necessary.    She was reassessed in July now with evidence of iron deficiency and a trial of oral iron was not tolerable.  Subsequent efforts to obtain more tolerable oral iron with Accrufer though this was not available until the last several days when the patient is seen 10/15/2024.      She was asked to return with studies 10/8/2024 H&H of 10.5 and 34.4, MCV of 81.9 and MCH of 25.0, ferritin of 15.3 and iron profile with 11% saturation.At this point the patient is better treated with IV iron.    The patient went on to be treated 10/15 and 10/22/2024 for total dose of 1020 mg Feraheme.    She is next seen 1/6 with studies 12/21 including 11% iron saturation, ferritin now up to 16, soluble transferrin receptor 18.9 and CBC with H&H of 0.9 and 37.6 with white count 10,003 and 20 and platelet count of 185,000.  Unfortunately she remains still quite weak and fatigued despite her hemoglobin improving and we have just turned and that she would be further treated with additional IV Feraheme today x 1.  She is also having right upper quadrant pain which led to an MRI of the abdomen 12/17/2024 that, again, demonstrates extensive fatty filtration of liver, several hepatic cysts, no suspicious liver lesions nor intrahepatic or  extrahepatic biliary dilatation.  Additionally splenic size is normal is no lymphadenopathy no abnormality pancreas or adrenals.   Considering continued anemia and right upper quadrant pain follow-up with GI consultation be requested and likely colonoscopy.    The patient went on to be treated with IV iron 2025-Feraheme 510 mg x 1.    The patient is seen back 2025 having recently started Mounjaro.  This was held as she cared for her brother who, unfortunately,  of liver cancer.  She herself had been seen by GI medicine continuing assessment for apparent fatty infiltration of the liver and is now scheduled for endoscopy the latter part of 2025.  Her follow-up iron studies include a ferritin of 423.9, iron profile with iron 68, saturation 24%, TIBC mildly reduced to 285.  Soluble transferrin receptor 17.1.  She is no longer iron deficient but is still having right upper quadrant pain.  She is encouraged to undergo the endoscopy as requested by GI medicine.    Patient is next in 2025.  Follow-up CBC includes an H&H of 12.9 and 39.7 additional issues since last seen include her endoscopy which polyps identified and removed.  She has follow-up in 5 years.  Additional issues including breast imaging with a stable mass in the left breast thought to be benign and a follow-up mammogram in 6 months.  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    Plan:  *Hold additional IV Feraheme at this point  *24 weeks-CBC, ferritin, iron profile, NP assessment

## 2025-07-02 ENCOUNTER — TELEPHONE (OUTPATIENT)
Dept: FAMILY MEDICINE CLINIC | Facility: CLINIC | Age: 75
End: 2025-07-02

## 2025-07-02 NOTE — TELEPHONE ENCOUNTER
Caller: LADARIUS    Relationship: \[facility]\"" [facility] [place]    Best call back number:     What is the best time to reach you:     Who are you requesting to speak with (clinical staff, provider,  specific staff member):     Do you know the name of the person who called:     What was the call regarding: LADARIUS FROM \[facility]\"" IS CALLING IN REGARDING A VALID PRESCRIPTION FOR CPAP SUPPLIES FOR THE PATIENT AND SHE WANTS TO BE CALLED BACK TO DISCUSS.

## 2025-07-08 NOTE — TELEPHONE ENCOUNTER
Called number provided in message and left VM informing Dr. Estes doesn't prescribe CPAP supplies.

## 2025-07-11 ENCOUNTER — PATIENT OUTREACH (OUTPATIENT)
Dept: CASE MANAGEMENT | Facility: OTHER | Age: 75
End: 2025-07-11
Payer: MEDICARE

## 2025-07-11 NOTE — OUTREACH NOTE
"AMBULATORY CASE MANAGEMENT NOTE    Names and Relationships of Patient/Support Persons: Contact: Pearl Christiansen \"Ramiro\"; Relationship: Self  Contact: Pearl Christiansen \"Ramiro\"; Relationship: Self -     Patient Outreach  RN-ACM outreach patient. Patient states to be doing well. Patient states to be compliant with medications and medical appointments. Patient states to be taking  and tolerating Mounjaro without difficulty. Patient states improvement regarding hip and neck pain. Patient states to have completed PT for neck pain. Patient states no difficulty with fever; chest pain; SOB; appetite or sleeping. Patient discusses CGM and voiced intent to discuss with PCP. Reviewed with education and patient verbalized understanding. Patient states to  appreciate outreach. No further questions voiced at this time.           Education Documentation  When to Seek Medical Attention, taught by Nallely Park, RN at 7/11/2025  5:10 PM.  Learner: Patient  Readiness: Acceptance  Method: Explanation  Response: Verbalizes Understanding    Participation with Disease Management Plan, taught by Nallely Park RN at 7/11/2025  5:10 PM.  Learner: Patient  Readiness: Acceptance  Method: Explanation  Response: Verbalizes Understanding          Nallely VEGA  Ambulatory Case Management    7/11/2025, 17:10 EDT  "

## 2025-07-21 RX ORDER — ESOMEPRAZOLE MAGNESIUM 40 MG/1
40 CAPSULE, DELAYED RELEASE ORAL
Qty: 90 CAPSULE | Refills: 0 | Status: SHIPPED | OUTPATIENT
Start: 2025-07-21

## 2025-07-23 DIAGNOSIS — Z87.19 HISTORY OF IBS: ICD-10-CM

## 2025-07-23 RX ORDER — DICYCLOMINE HYDROCHLORIDE 10 MG/1
CAPSULE ORAL
Qty: 90 CAPSULE | Refills: 0 | Status: SHIPPED | OUTPATIENT
Start: 2025-07-23

## 2025-07-23 NOTE — TELEPHONE ENCOUNTER
Rx Refill Note  Requested Prescriptions     Pending Prescriptions Disp Refills    dicyclomine (BENTYL) 10 MG capsule [Pharmacy Med Name: Dicyclomine HCl 10 MG Oral Capsule] 90 capsule 0     Sig: TAKE 1 CAPSULE BY MOUTH THREE TIMES DAILY AS NEEDED FOR CRAMPS      Last office visit with prescribing clinician: 6/3/2025   Last telemedicine visit with prescribing clinician: Visit date not found   Next office visit with prescribing clinician: 12/5/2025                         Would you like a call back once the refill request has been completed: [] Yes [] No    If the office needs to give you a call back, can they leave a voicemail: [] Yes [] No    William Membreno MA  07/23/25, 08:14 EDT

## 2025-08-07 RX ORDER — LEVOTHYROXINE SODIUM 50 UG/1
50 TABLET ORAL DAILY
Qty: 90 TABLET | Refills: 1 | Status: SHIPPED | OUTPATIENT
Start: 2025-08-07

## 2025-08-15 RX ORDER — METFORMIN HYDROCHLORIDE 500 MG/1
TABLET, EXTENDED RELEASE ORAL
Qty: 180 TABLET | Refills: 0 | Status: SHIPPED | OUTPATIENT
Start: 2025-08-15

## 2025-08-17 DIAGNOSIS — Z87.19 HISTORY OF IBS: ICD-10-CM

## 2025-08-22 RX ORDER — DICYCLOMINE HYDROCHLORIDE 10 MG/1
10 CAPSULE ORAL DAILY PRN
Qty: 90 CAPSULE | Refills: 0 | Status: SHIPPED | OUTPATIENT
Start: 2025-08-22

## 2025-08-25 DIAGNOSIS — M54.9 MID BACK PAIN ON RIGHT SIDE: ICD-10-CM

## 2025-08-25 RX ORDER — GABAPENTIN 300 MG/1
300 CAPSULE ORAL 3 TIMES DAILY
Qty: 270 CAPSULE | Refills: 0 | Status: SHIPPED | OUTPATIENT
Start: 2025-08-25

## (undated) DEVICE — NDL SPINE 25G 31/2IN BLU

## (undated) DEVICE — EPIDURAL TRAY: Brand: MEDLINE INDUSTRIES, INC.

## (undated) DEVICE — GLV SURG TRIUMPH PF LTX 7.5 STRL

## (undated) DEVICE — NDL SPINE 22G 31/2IN BLK

## (undated) DEVICE — NEEDLE, QUINCKE, 22GX5": Brand: MEDLINE